# Patient Record
Sex: FEMALE | Race: WHITE | Employment: UNEMPLOYED
[De-identification: names, ages, dates, MRNs, and addresses within clinical notes are randomized per-mention and may not be internally consistent; named-entity substitution may affect disease eponyms.]

---

## 2017-01-23 PROBLEM — R45.0 NERVOUSNESS: Status: ACTIVE | Noted: 2017-01-23

## 2017-03-03 ENCOUNTER — TELEPHONE (OUTPATIENT)
Dept: OBGYN | Facility: CLINIC | Age: 22
End: 2017-03-03

## 2017-11-14 ENCOUNTER — HOSPITAL ENCOUNTER (OUTPATIENT)
Age: 22
Discharge: HOME | End: 2017-11-14
Payer: MEDICARE

## 2017-11-14 PROCEDURE — 87081 CULTURE SCREEN ONLY: CPT

## 2017-11-16 LAB
CULTURE: NORMAL
Lab: NORMAL
SPECIMEN DESCRIPTION: NORMAL
SPECIMEN DESCRIPTION: NORMAL
STATUS: NORMAL

## 2018-02-15 ENCOUNTER — OFFICE VISIT (OUTPATIENT)
Dept: OBGYN | Age: 23
End: 2018-02-15
Payer: MEDICARE

## 2018-02-15 VITALS — DIASTOLIC BLOOD PRESSURE: 72 MMHG | BODY MASS INDEX: 34.86 KG/M2 | WEIGHT: 216 LBS | SYSTOLIC BLOOD PRESSURE: 120 MMHG

## 2018-02-15 DIAGNOSIS — N92.6 IRREGULAR MENSES: Primary | ICD-10-CM

## 2018-02-15 LAB
CONTROL: NORMAL
PREGNANCY TEST URINE, POC: NEGATIVE

## 2018-02-15 PROCEDURE — G8428 CUR MEDS NOT DOCUMENT: HCPCS | Performed by: ADVANCED PRACTICE MIDWIFE

## 2018-02-15 PROCEDURE — 96372 THER/PROPH/DIAG INJ SC/IM: CPT | Performed by: ADVANCED PRACTICE MIDWIFE

## 2018-02-15 PROCEDURE — 81025 URINE PREGNANCY TEST: CPT | Performed by: ADVANCED PRACTICE MIDWIFE

## 2018-02-15 PROCEDURE — G8417 CALC BMI ABV UP PARAM F/U: HCPCS | Performed by: ADVANCED PRACTICE MIDWIFE

## 2018-02-15 RX ORDER — MEDROXYPROGESTERONE ACETATE 150 MG/ML
150 INJECTION, SUSPENSION INTRAMUSCULAR ONCE
Status: COMPLETED | OUTPATIENT
Start: 2018-02-15 | End: 2018-02-15

## 2018-02-15 RX ADMIN — MEDROXYPROGESTERONE ACETATE 150 MG: 150 INJECTION, SUSPENSION INTRAMUSCULAR at 15:26

## 2018-03-15 ENCOUNTER — APPOINTMENT (OUTPATIENT)
Dept: CT IMAGING | Age: 23
End: 2018-03-15
Payer: MEDICARE

## 2018-03-15 ENCOUNTER — HOSPITAL ENCOUNTER (EMERGENCY)
Age: 23
Discharge: HOME | End: 2018-03-15
Attending: EMERGENCY MEDICINE
Payer: MEDICARE

## 2018-03-15 VITALS
SYSTOLIC BLOOD PRESSURE: 119 MMHG | DIASTOLIC BLOOD PRESSURE: 83 MMHG | OXYGEN SATURATION: 99 % | BODY MASS INDEX: 33.09 KG/M2 | WEIGHT: 205 LBS | RESPIRATION RATE: 16 BRPM | HEART RATE: 87 BPM | TEMPERATURE: 98.8 F

## 2018-03-15 DIAGNOSIS — R10.9 FLANK PAIN: Primary | ICD-10-CM

## 2018-03-15 DIAGNOSIS — N20.0 NEPHROLITHIASIS: ICD-10-CM

## 2018-03-15 LAB
-: ABNORMAL
ABSOLUTE EOS #: 0.12 K/UL (ref 0–0.44)
ABSOLUTE IMMATURE GRANULOCYTE: 0.03 K/UL (ref 0–0.3)
ABSOLUTE LYMPH #: 2.27 K/UL (ref 1.1–3.7)
ABSOLUTE MONO #: 0.47 K/UL (ref 0.1–1.2)
ALBUMIN SERPL-MCNC: 4.2 G/DL (ref 3.5–5.2)
ALBUMIN/GLOBULIN RATIO: 1.5 (ref 1–2.5)
ALP BLD-CCNC: 50 U/L (ref 35–104)
ALT SERPL-CCNC: 13 U/L (ref 5–33)
AMORPHOUS: ABNORMAL
ANION GAP SERPL CALCULATED.3IONS-SCNC: 10 MMOL/L (ref 9–17)
AST SERPL-CCNC: 14 U/L
BACTERIA: ABNORMAL
BASOPHILS # BLD: 0 % (ref 0–2)
BASOPHILS ABSOLUTE: 0.03 K/UL (ref 0–0.2)
BILIRUB SERPL-MCNC: 0.23 MG/DL (ref 0.3–1.2)
BILIRUBIN URINE: NEGATIVE
BUN BLDV-MCNC: 19 MG/DL (ref 6–20)
BUN/CREAT BLD: 25 (ref 9–20)
CALCIUM SERPL-MCNC: 9.4 MG/DL (ref 8.6–10.4)
CASTS UA: ABNORMAL /LPF
CHLORIDE BLD-SCNC: 103 MMOL/L (ref 98–107)
CO2: 26 MMOL/L (ref 20–31)
COLOR: YELLOW
COMMENT UA: ABNORMAL
CREAT SERPL-MCNC: 0.77 MG/DL (ref 0.5–0.9)
CRYSTALS, UA: ABNORMAL /HPF
DIFFERENTIAL TYPE: ABNORMAL
EOSINOPHILS RELATIVE PERCENT: 2 % (ref 1–4)
EPITHELIAL CELLS UA: ABNORMAL /HPF (ref 0–25)
GFR AFRICAN AMERICAN: >60 ML/MIN
GFR NON-AFRICAN AMERICAN: >60 ML/MIN
GFR SERPL CREATININE-BSD FRML MDRD: ABNORMAL ML/MIN/{1.73_M2}
GFR SERPL CREATININE-BSD FRML MDRD: ABNORMAL ML/MIN/{1.73_M2}
GLUCOSE BLD-MCNC: 84 MG/DL (ref 70–99)
GLUCOSE URINE: NEGATIVE
HCG(URINE) PREGNANCY TEST: NEGATIVE
HCT VFR BLD CALC: 41.3 % (ref 36.3–47.1)
HEMOGLOBIN: 14 G/DL (ref 11.9–15.1)
IMMATURE GRANULOCYTES: 0 %
KETONES, URINE: NEGATIVE
LEUKOCYTE ESTERASE, URINE: NEGATIVE
LIPASE: 30 U/L (ref 13–60)
LYMPHOCYTES # BLD: 28 % (ref 24–43)
MCH RBC QN AUTO: 30.1 PG (ref 25.2–33.5)
MCHC RBC AUTO-ENTMCNC: 33.9 G/DL (ref 28.4–34.8)
MCV RBC AUTO: 88.8 FL (ref 82.6–102.9)
MONOCYTES # BLD: 6 % (ref 3–12)
MUCUS: ABNORMAL
NITRITE, URINE: NEGATIVE
NRBC AUTOMATED: 0 PER 100 WBC
OTHER OBSERVATIONS UA: ABNORMAL
PDW BLD-RTO: 11.5 % (ref 11.8–14.4)
PH UA: 6.5 (ref 5–9)
PLATELET # BLD: 283 K/UL (ref 138–453)
PLATELET ESTIMATE: ABNORMAL
PMV BLD AUTO: 9.5 FL (ref 8.1–13.5)
POTASSIUM SERPL-SCNC: 4.1 MMOL/L (ref 3.7–5.3)
PROTEIN UA: NEGATIVE
RBC # BLD: 4.65 M/UL (ref 3.95–5.11)
RBC # BLD: ABNORMAL 10*6/UL
RBC UA: ABNORMAL /HPF (ref 0–2)
RENAL EPITHELIAL, UA: ABNORMAL /HPF
SEG NEUTROPHILS: 64 % (ref 36–65)
SEGMENTED NEUTROPHILS ABSOLUTE COUNT: 5.21 K/UL (ref 1.5–8.1)
SODIUM BLD-SCNC: 139 MMOL/L (ref 135–144)
SPECIFIC GRAVITY UA: 1.02 (ref 1.01–1.02)
TOTAL PROTEIN: 7 G/DL (ref 6.4–8.3)
TRICHOMONAS: ABNORMAL
TURBIDITY: CLEAR
URINE HGB: NEGATIVE
UROBILINOGEN, URINE: NORMAL
WBC # BLD: 8.1 K/UL (ref 3.5–11.3)
WBC # BLD: ABNORMAL 10*3/UL
WBC UA: ABNORMAL /HPF (ref 0–5)
YEAST: ABNORMAL

## 2018-03-15 PROCEDURE — 84703 CHORIONIC GONADOTROPIN ASSAY: CPT

## 2018-03-15 PROCEDURE — 85025 COMPLETE CBC W/AUTO DIFF WBC: CPT

## 2018-03-15 PROCEDURE — 6360000004 HC RX CONTRAST MEDICATION: Performed by: EMERGENCY MEDICINE

## 2018-03-15 PROCEDURE — 87086 URINE CULTURE/COLONY COUNT: CPT

## 2018-03-15 PROCEDURE — 80053 COMPREHEN METABOLIC PANEL: CPT

## 2018-03-15 PROCEDURE — 6360000002 HC RX W HCPCS: Performed by: EMERGENCY MEDICINE

## 2018-03-15 PROCEDURE — 96374 THER/PROPH/DIAG INJ IV PUSH: CPT

## 2018-03-15 PROCEDURE — 96375 TX/PRO/DX INJ NEW DRUG ADDON: CPT

## 2018-03-15 PROCEDURE — 83690 ASSAY OF LIPASE: CPT

## 2018-03-15 PROCEDURE — 99284 EMERGENCY DEPT VISIT MOD MDM: CPT

## 2018-03-15 PROCEDURE — 81001 URINALYSIS AUTO W/SCOPE: CPT

## 2018-03-15 PROCEDURE — 74177 CT ABD & PELVIS W/CONTRAST: CPT

## 2018-03-15 PROCEDURE — 36415 COLL VENOUS BLD VENIPUNCTURE: CPT

## 2018-03-15 RX ORDER — ONDANSETRON 2 MG/ML
4 INJECTION INTRAMUSCULAR; INTRAVENOUS ONCE
Status: COMPLETED | OUTPATIENT
Start: 2018-03-15 | End: 2018-03-15

## 2018-03-15 RX ORDER — FENTANYL CITRATE 50 UG/ML
50 INJECTION, SOLUTION INTRAMUSCULAR; INTRAVENOUS ONCE
Status: COMPLETED | OUTPATIENT
Start: 2018-03-15 | End: 2018-03-15

## 2018-03-15 RX ORDER — KETOROLAC TROMETHAMINE 15 MG/ML
15 INJECTION, SOLUTION INTRAMUSCULAR; INTRAVENOUS ONCE
Status: COMPLETED | OUTPATIENT
Start: 2018-03-15 | End: 2018-03-15

## 2018-03-15 RX ADMIN — ONDANSETRON 4 MG: 2 INJECTION INTRAMUSCULAR; INTRAVENOUS at 14:58

## 2018-03-15 RX ADMIN — IOPAMIDOL 100 ML: 612 INJECTION, SOLUTION INTRAVENOUS at 16:06

## 2018-03-15 RX ADMIN — FENTANYL CITRATE 50 MCG: 50 INJECTION INTRAMUSCULAR; INTRAVENOUS at 16:10

## 2018-03-15 RX ADMIN — KETOROLAC TROMETHAMINE 15 MG: 15 INJECTION, SOLUTION INTRAMUSCULAR; INTRAVENOUS at 14:58

## 2018-03-15 ASSESSMENT — ENCOUNTER SYMPTOMS
PHOTOPHOBIA: 0
VOMITING: 0
TROUBLE SWALLOWING: 0
FACIAL SWELLING: 0
SORE THROAT: 0
VOICE CHANGE: 0
SHORTNESS OF BREATH: 0
CHEST TIGHTNESS: 0
BACK PAIN: 0
DIARRHEA: 0
NAUSEA: 0
ABDOMINAL PAIN: 0
COUGH: 0
BLOOD IN STOOL: 0
WHEEZING: 0

## 2018-03-15 ASSESSMENT — PAIN SCALES - GENERAL
PAINLEVEL_OUTOF10: 8
PAINLEVEL_OUTOF10: 9
PAINLEVEL_OUTOF10: 5
PAINLEVEL_OUTOF10: 7
PAINLEVEL_OUTOF10: 8

## 2018-03-15 ASSESSMENT — PAIN DESCRIPTION - PAIN TYPE: TYPE: ACUTE PAIN

## 2018-03-15 ASSESSMENT — PAIN DESCRIPTION - ORIENTATION: ORIENTATION: RIGHT

## 2018-03-15 ASSESSMENT — PAIN DESCRIPTION - LOCATION: LOCATION: FLANK

## 2018-03-15 NOTE — ED NOTES
Pt aware of high volume in the department, awaiting room availability     Lor Johnson, MARCO A  03/15/18 7150

## 2018-03-15 NOTE — ED PROVIDER NOTES
[facility] ED  eMERGENCY dEPARTMENT eNCOUnter      Pt Name: Park Field  MRN: [id number]  [place] 1995  Date of evaluation: 3/15/2018  Provider: Elfego Aviles DO, [address] [facility] [address]       Chief Complaint   Patient presents with    Flank Pain     pt states onset x 3 days. Right sided    Emesis     unable to keep anything down       HISTORY OF PRESENT ILLNESS    Park Field is a 24 y.o. female who presents to the emergency department from home With complaints of right flank pain that started approximately 3 days ago. She has had several episodes of vomiting as well. No fever, no dysuria, no hematuria, no vaginal bleeding or vaginal discharge, no cough, no shortness of breath or chest pain. Patient has a history of ureterolithiasis, states this feels somewhat similar. Triage notes and Nursing notes were reviewed by myself. Any discrepancies are addressed above. PAST MEDICAL HISTORY     Past Medical History:   Diagnosis Date    Deafness     Ganglion cyst of wrist 2010    left    Hereditary nephropathy (Alport's) as achild     had renal biopsy    Irritable bowel syndrome     Kidney disease     Microscopic hematuria     Obesity     Proteinuria     Sensorineural hearing loss     Thin basement membrane disease     Bx proven       SURGICAL HISTORY       Past Surgical History:   Procedure Laterality Date    COLONOSCOPY  2009    Dr. Gus Haywood Left 8/18/2015    KIDNEY BIOPSY  12/29/98    URETEROSCOPY  8-    WISDOM TOOTH EXTRACTION      WRIST SURGERY  2010    cyst removal       CURRENT MEDICATIONS       Previous Medications    AZITHROMYCIN (ZITHROMAX) 250 MG TABLET    Take 2 tabs (500 mg) on Day 1, and take 1 tab (250 mg) on days 2 through 5.     CITALOPRAM (CELEXA) 20 MG TABLET    Take 1 tablet by mouth daily    NORGESTIMATE-ETHINYL ESTRADIOL (ORTHO-CYCLEN, 28,) 0.25-35 MG-MCG PER TABLET    Take 1 tablet by mouth daily    PRENATAL VIT-IRON Does not bruise/bleed easily. Psychiatric/Behavioral: Negative for confusion. The patient is not nervous/anxious. Except as noted above the remainder of the review of systems was reviewed and is negative. PHYSICAL EXAM    (up to 7 for level 4, 8 or more for level 5)     ED Triage Vitals [03/15/18 1330]   BP Temp Temp Source Pulse Resp SpO2 Height Weight   (!) 142/85 98.8 °F (37.1 °C) Tympanic 88 16 99 % -- 205 lb (93 kg)       Physical Exam   Constitutional: She is oriented to person, place, and time. Vital signs are normal. She appears well-developed and well-nourished. Non-toxic appearance. She does not appear ill. No distress. HENT:   Head: Normocephalic and atraumatic. Mouth/Throat: Oropharynx is clear and moist.   Eyes: Conjunctivae and EOM are normal. Pupils are equal, round, and reactive to light. Right conjunctiva is not injected. Left conjunctiva is not injected. No scleral icterus. Neck: Normal range of motion. Neck supple. No tracheal deviation present. No thyromegaly present. Cardiovascular: Normal rate, regular rhythm, normal heart sounds and intact distal pulses. Exam reveals no gallop and no friction rub. No murmur heard. Pulmonary/Chest: Effort normal and breath sounds normal. No stridor. No respiratory distress. She has no wheezes. She has no rales. Abdominal: Soft. Bowel sounds are normal. She exhibits no distension and no mass. There is tenderness (tender palpation right upper quadrant). There is no rebound and no guarding. Negative Varela's sign  Nontender McBurney's Point  Negative Rovsig's sign  No bruising or echymosis of abdomen   Musculoskeletal: She exhibits tenderness (tender to palpation right CVA). She exhibits no edema. Negative Mai's Sign bilaterally   Lymphadenopathy:     She has no cervical adenopathy. Neurological: She is alert and oriented to person, place, and time. No cranial nerve deficit. She exhibits normal muscle tone.  Coordination normal. No nystagmus   Skin: Skin is warm and dry. No rash noted. She is not diaphoretic. No erythema. No pallor. Psychiatric: She has a normal mood and affect. Her behavior is normal. Thought content normal.   Nursing note and vitals reviewed. DIAGNOSTIC RESULTS     EKG: (none if blank)  All EKG's are interpreted by the Emergency Department Physician who either signs or Co-signs this chart in the absence of a cardiologist.        RADIOLOGY: (none if blank)   Interpretation per the Radiologist below, if available at the time of this note:    CT ABDOMEN PELVIS W IV CONTRAST   Final Result          LABS:  Labs Reviewed   URINALYSIS WITH MICROSCOPIC - Abnormal; Notable for the following:        Result Value    Specific Gravity, UA 1.025 (*)     Bacteria, UA TRACE (*)     Mucus, UA TRACE (*)     All other components within normal limits   CBC WITH AUTO DIFFERENTIAL - Abnormal; Notable for the following:     RDW 11.5 (*)     All other components within normal limits   COMPREHENSIVE METABOLIC PANEL - Abnormal; Notable for the following:     Bun/Cre Ratio 25 (*)     Total Bilirubin 0.23 (*)     All other components within normal limits   URINE CULTURE   PREGNANCY, URINE   LIPASE       All other labs were within normal range or not returned as of this dictation. EMERGENCY DEPARTMENT COURSE and Medical Decision Making:     MDM/  ED Course      Nontoxic well appearing 62-year-old lady with right flank pain and right upper quadrant abdominal pain. She has a 7 mm nephrolithiasis on the right, even if passing, is not obstructing.   We will refer her to urology, Dr. Ivon Witt    Strict return precautions and follow up instructions were discussed with the patient with which the patient agrees    ED Medications administered this visit:    Medications   ketorolac (TORADOL) injection 15 mg (15 mg Intravenous Given 3/15/18 1818)   ondansetron (ZOFRAN) injection 4 mg (4 mg Intravenous Given 3/15/18 1458)   fentaNYL (SUBLIMAZE)

## 2018-03-16 ENCOUNTER — HOSPITAL ENCOUNTER (OUTPATIENT)
Age: 23
Discharge: HOME | End: 2018-03-16
Payer: MEDICARE

## 2018-03-16 ENCOUNTER — HOSPITAL ENCOUNTER (OUTPATIENT)
Dept: ULTRASOUND IMAGING | Age: 23
Discharge: HOME | End: 2018-03-18
Payer: MEDICARE

## 2018-03-16 ENCOUNTER — HOSPITAL ENCOUNTER (OUTPATIENT)
Dept: INFUSION THERAPY | Age: 23
Discharge: HOME | End: 2018-03-16
Payer: MEDICARE

## 2018-03-16 VITALS
RESPIRATION RATE: 20 BRPM | HEART RATE: 100 BPM | TEMPERATURE: 98.8 F | SYSTOLIC BLOOD PRESSURE: 146 MMHG | DIASTOLIC BLOOD PRESSURE: 96 MMHG

## 2018-03-16 DIAGNOSIS — R10.11 RUQ PAIN: ICD-10-CM

## 2018-03-16 DIAGNOSIS — R10.9 RT FLANK PAIN: ICD-10-CM

## 2018-03-16 LAB
CULTURE: NORMAL
CULTURE: NORMAL
DIRECT EXAM: NORMAL
Lab: NORMAL
SPECIMEN DESCRIPTION: NORMAL
STATUS: NORMAL
STATUS: NORMAL

## 2018-03-16 PROCEDURE — 87081 CULTURE SCREEN ONLY: CPT

## 2018-03-16 PROCEDURE — 96372 THER/PROPH/DIAG INJ SC/IM: CPT

## 2018-03-16 PROCEDURE — 87070 CULTURE OTHR SPECIMN AEROBIC: CPT

## 2018-03-16 PROCEDURE — 6360000002 HC RX W HCPCS: Performed by: INTERNAL MEDICINE

## 2018-03-16 PROCEDURE — 87491 CHLMYD TRACH DNA AMP PROBE: CPT

## 2018-03-16 PROCEDURE — 76705 ECHO EXAM OF ABDOMEN: CPT

## 2018-03-16 PROCEDURE — 88175 CYTOPATH C/V AUTO FLUID REDO: CPT

## 2018-03-16 PROCEDURE — 87624 HPV HI-RISK TYP POOLED RSLT: CPT

## 2018-03-16 PROCEDURE — 87591 N.GONORRHOEAE DNA AMP PROB: CPT

## 2018-03-16 PROCEDURE — 87210 SMEAR WET MOUNT SALINE/INK: CPT

## 2018-03-16 RX ORDER — CEFTRIAXONE 1 G/1
INJECTION, POWDER, FOR SOLUTION INTRAMUSCULAR; INTRAVENOUS
Status: DISCONTINUED
Start: 2018-03-16 | End: 2018-03-17 | Stop reason: HOSPADM

## 2018-03-16 RX ORDER — CEFTRIAXONE 1 G/1
1 INJECTION, POWDER, FOR SOLUTION INTRAMUSCULAR; INTRAVENOUS ONCE
Status: COMPLETED | OUTPATIENT
Start: 2018-03-16 | End: 2018-03-16

## 2018-03-16 RX ADMIN — CEFTRIAXONE 1 G: 1 INJECTION, POWDER, FOR SOLUTION INTRAMUSCULAR; INTRAVENOUS at 18:34

## 2018-03-16 NOTE — PLAN OF CARE
Spoke with Dr. Mejia Watkins in regards to pt. Had been given option of getting IM Rocephin or being admitted as was discussed between pt and Yemi Cordova NP. Pt. agreed to have IM Rocephin, but now wants to be admitted due to extreme lower abdominal/pelvic pain #10/10. Dr. Mejia Watkins stated pt. Needed to try and let IM Rocephin and PO antibiotics work, and take prescribed Ultram.  \"Admission is not necessary\".

## 2018-03-16 NOTE — PLAN OF CARE
Pt. Lower abdominal/pelvic pain #10/10. Pt. Was given 1GM Rocephin IM (500mg IM (R) deltoid + 500 mg (L) deltoid=1GM total). Bandaid applied to both sites. Pt. Tolerated IM injections and was discharged ambulatory. Informed pt,. If pain still intolerable to F/U with Dr. Giovanni Hess tomorrow.

## 2018-03-19 ENCOUNTER — HOSPITAL ENCOUNTER (OUTPATIENT)
Dept: ULTRASOUND IMAGING | Age: 23
Discharge: HOME | End: 2018-03-21
Payer: MEDICARE

## 2018-03-19 DIAGNOSIS — R10.2 PELVIC PAIN IN FEMALE: ICD-10-CM

## 2018-03-19 DIAGNOSIS — N89.8 VAGINAL DISCHARGE: ICD-10-CM

## 2018-03-19 PROCEDURE — 76856 US EXAM PELVIC COMPLETE: CPT

## 2018-03-20 ENCOUNTER — OFFICE VISIT (OUTPATIENT)
Dept: OBGYN | Age: 23
End: 2018-03-20
Payer: MEDICARE

## 2018-03-20 VITALS
BODY MASS INDEX: 34.52 KG/M2 | WEIGHT: 214.8 LBS | DIASTOLIC BLOOD PRESSURE: 76 MMHG | SYSTOLIC BLOOD PRESSURE: 114 MMHG | HEIGHT: 66 IN

## 2018-03-20 DIAGNOSIS — R10.2 ACUTE PELVIC PAIN, FEMALE: Primary | ICD-10-CM

## 2018-03-20 DIAGNOSIS — H91.3 DEAF, NONSPEAKING: ICD-10-CM

## 2018-03-20 DIAGNOSIS — Z87.42 HISTORY OF DYSMENORRHEA: ICD-10-CM

## 2018-03-20 DIAGNOSIS — N80.03 ADENOMYOSIS: ICD-10-CM

## 2018-03-20 DIAGNOSIS — N92.1 BREAKTHROUGH BLEEDING ON DEPO PROVERA: ICD-10-CM

## 2018-03-20 LAB
CHLAMYDIA BY THIN PREP: NEGATIVE
CULTURE: NORMAL
Lab: NORMAL
N. GONORRHOEAE DNA, THIN PREP: NEGATIVE
SPECIMEN DESCRIPTION: NORMAL
SPECIMEN DESCRIPTION: NORMAL
STATUS: NORMAL

## 2018-03-20 PROCEDURE — 4004F PT TOBACCO SCREEN RCVD TLK: CPT | Performed by: ADVANCED PRACTICE MIDWIFE

## 2018-03-20 PROCEDURE — G8417 CALC BMI ABV UP PARAM F/U: HCPCS | Performed by: ADVANCED PRACTICE MIDWIFE

## 2018-03-20 PROCEDURE — G8427 DOCREV CUR MEDS BY ELIG CLIN: HCPCS | Performed by: ADVANCED PRACTICE MIDWIFE

## 2018-03-20 PROCEDURE — G8484 FLU IMMUNIZE NO ADMIN: HCPCS | Performed by: ADVANCED PRACTICE MIDWIFE

## 2018-03-20 PROCEDURE — 99214 OFFICE O/P EST MOD 30 MIN: CPT | Performed by: ADVANCED PRACTICE MIDWIFE

## 2018-03-20 RX ORDER — TRAMADOL HYDROCHLORIDE 50 MG/1
50 TABLET ORAL EVERY 6 HOURS PRN
COMMUNITY
End: 2018-04-17

## 2018-03-20 RX ORDER — DOXYCYCLINE HYCLATE 100 MG/1
CAPSULE ORAL
COMMUNITY
Start: 2018-03-16 | End: 2018-03-20 | Stop reason: SDUPTHER

## 2018-03-20 RX ORDER — NAPROXEN 500 MG/1
500 TABLET ORAL 2 TIMES DAILY WITH MEALS
Qty: 60 TABLET | Refills: 3 | Status: SHIPPED | OUTPATIENT
Start: 2018-03-20 | End: 2018-04-17

## 2018-03-20 NOTE — PROGRESS NOTES
PROBLEM VISIT     Date of service: 3/20/2018    Tito Colin  Is a 24 y.o. single female    PT's PCP is: Leopold Flake, MD     : 1995                                             Subjective:       Patient's last menstrual period was 2018. OB History    Para Term  AB Living   2 1 1     1   SAB TAB Ectopic Molar Multiple Live Births             1      # Outcome Date GA Lbr Poli/2nd Weight Sex Delivery Anes PTL Lv   2 Term 14 40w0d  7 lb 6 oz (3.345 kg) F Vag-Spont  N NAYELI   1                     History   Smoking Status    Current Some Day Smoker    Types: Cigarettes    Last attempt to quit: 3/18/2014   Smokeless Tobacco    Never Used     Comment: several cigarettes daily        History   Alcohol Use No       Allergies: Patient has no known allergies.       Current Outpatient Prescriptions:     traMADol (ULTRAM) 50 MG tablet, Take 50 mg by mouth every 6 hours as needed for Pain., Disp: , Rfl:     naproxen (NAPROSYN) 500 MG tablet, Take 1 tablet by mouth 2 times daily (with meals), Disp: 60 tablet, Rfl: 3    ondansetron (ZOFRAN) 4 MG tablet, Take 1 tablet by mouth every 8 hours as needed for Nausea or Vomiting, Disp: 15 tablet, Rfl: 0    metroNIDAZOLE (FLAGYL) 500 MG tablet, Take 1 tablet by mouth 2 times daily for 14 days, Disp: 28 tablet, Rfl: 0    doxycycline hyclate (VIBRA-TABS) 100 MG tablet, Take 1 tablet by mouth 2 times daily for 14 days, Disp: 28 tablet, Rfl: 0    medroxyPROGESTERone (DEPO-PROVERA) 150 MG/ML injection, Inject 150 mg into the muscle every 3 months, Disp: , Rfl:     citalopram (CELEXA) 20 MG tablet, Take 1 tablet by mouth daily, Disp: 30 tablet, Rfl: 5    topiramate (TOPAMAX) 25 MG tablet, Take 1 tablet by mouth daily, Disp: 30 tablet, Rfl: 1    History   Sexual Activity    Sexual activity: Yes    Partners: Male       Last Yearly:  3/16/18    Last pap: 3/16/18    Last HPV: 3/16/18    Chief Complaint   Patient presents with   [facility] shop to  her medications and they were closed at that point. Patient then followed up with Dr. Teodoro Hammond yesterday. We reviewed this. The CT scan prevent any by the ER and the ultrasound that Isabel Frazier had ordered. CT scan was uneventful. Cultures returning are negative. Pap remains still out. Ultrasound was within normal limits with a possible adenomyosis. Patient states vaginal discharge at this point in time is old blood but she is still changing her pad    Yes PT denies fever, chills, nausea and vomiting       Objective     Pelvic Exam: Right lower quadrant tender with palpation                         Assessment and Plan         1. Acute pelvic pain, female  naproxen (NAPROSYN) 500 MG tablet   2. Deaf, nonspeaking  naproxen (NAPROSYN) 500 MG tablet   3. Breakthrough bleeding on depo provera     4. History of dysmenorrhea     5. Adenomyosis          discussed with patient her history of dysmenorrhea we were utilizing Depo-Provera to control her cycles the first injection of Depo-Provera was mid February 2018 and has only been about 4 weeks and fat injection. Patient did have what appears to be a period on the depo injection with significant left lower quadrant pain. Discussed anti-inflammatory medications to help alleviate this  Discussed possible adenomyosis versus endometriosis  Discussed heating pad with ice packs  Discussed no resections at this point in time  Discussed returning to office with significant increase in pain  Discussed diagnostic laparoscopy for further review  Patient discussed she would like a female doctor  Referral to Dr. Lisa Reyes    Patient is comfortable at this point in time with current plan of care    All of this was completed through a certified . I am having Ms. Tenorio start on naproxen. I am also having her maintain her citalopram, topiramate, medroxyPROGESTERone, ondansetron, metroNIDAZOLE, doxycycline hyclate, and traMADol.     Next week with Dr. Cristiano Rawls      Over 50% of time spent on counseling and care coordination on: see assessment and plan,  She was also counseled on her preventative health maintenance recommendations and follow-up.         FF time: 25 min      Josphine Amass Pool,3/20/2018 5:58 PM

## 2018-03-21 LAB
HPV SAMPLE: ABNORMAL
HPV SOURCE: ABNORMAL
HPV, GENOTYPE 16: NOT DETECTED
HPV, GENOTYPE 18: NOT DETECTED
HPV, HIGH RISK OTHER: DETECTED
HPV, INTERPRETATION: ABNORMAL

## 2018-03-26 ENCOUNTER — OFFICE VISIT (OUTPATIENT)
Dept: OBGYN | Age: 23
End: 2018-03-26
Payer: MEDICARE

## 2018-03-26 VITALS — BODY MASS INDEX: 34.8 KG/M2 | DIASTOLIC BLOOD PRESSURE: 78 MMHG | WEIGHT: 215.6 LBS | SYSTOLIC BLOOD PRESSURE: 124 MMHG

## 2018-03-26 DIAGNOSIS — N94.6 DYSMENORRHEA: ICD-10-CM

## 2018-03-26 DIAGNOSIS — Z01.818 PREOP TESTING: ICD-10-CM

## 2018-03-26 DIAGNOSIS — R93.49 ABNORMAL RADIOLOGIC FINDINGS ON DIAGNOSTIC IMAGING OF OTHER URINARY ORGANS: ICD-10-CM

## 2018-03-26 DIAGNOSIS — N92.1 MENORRHAGIA WITH IRREGULAR CYCLE: Primary | ICD-10-CM

## 2018-03-26 PROCEDURE — G8417 CALC BMI ABV UP PARAM F/U: HCPCS | Performed by: OBSTETRICS & GYNECOLOGY

## 2018-03-26 PROCEDURE — G8427 DOCREV CUR MEDS BY ELIG CLIN: HCPCS | Performed by: OBSTETRICS & GYNECOLOGY

## 2018-03-26 PROCEDURE — 4004F PT TOBACCO SCREEN RCVD TLK: CPT | Performed by: OBSTETRICS & GYNECOLOGY

## 2018-03-26 PROCEDURE — 99213 OFFICE O/P EST LOW 20 MIN: CPT | Performed by: OBSTETRICS & GYNECOLOGY

## 2018-03-26 PROCEDURE — G8484 FLU IMMUNIZE NO ADMIN: HCPCS | Performed by: OBSTETRICS & GYNECOLOGY

## 2018-03-26 RX ORDER — ACETAMINOPHEN AND CODEINE PHOSPHATE 300; 30 MG/1; MG/1
1-2 TABLET ORAL EVERY 6 HOURS PRN
Qty: 20 TABLET | Refills: 0 | Status: SHIPPED | OUTPATIENT
Start: 2018-03-26 | End: 2018-03-31

## 2018-03-27 ENCOUNTER — HOSPITAL ENCOUNTER (OUTPATIENT)
Age: 23
Discharge: HOME | End: 2018-03-27
Payer: MEDICARE

## 2018-03-27 DIAGNOSIS — R93.49 ABNORMAL RADIOLOGIC FINDINGS ON DIAGNOSTIC IMAGING OF OTHER URINARY ORGANS: ICD-10-CM

## 2018-03-27 DIAGNOSIS — Z01.818 PREOP TESTING: ICD-10-CM

## 2018-03-27 DIAGNOSIS — N92.1 MENORRHAGIA WITH IRREGULAR CYCLE: ICD-10-CM

## 2018-03-27 LAB
HCG QUANTITATIVE: <1 IU/L
HCT VFR BLD CALC: 44.7 % (ref 36.3–47.1)
HEMOGLOBIN: 14.7 G/DL (ref 11.9–15.1)
MCH RBC QN AUTO: 30 PG (ref 25.2–33.5)
MCHC RBC AUTO-ENTMCNC: 32.9 G/DL (ref 28.4–34.8)
MCV RBC AUTO: 91.2 FL (ref 82.6–102.9)
NRBC AUTOMATED: 0 PER 100 WBC
PDW BLD-RTO: 11.4 % (ref 11.8–14.4)
PLATELET # BLD: 289 K/UL (ref 138–453)
PMV BLD AUTO: 9.6 FL (ref 8.1–13.5)
RBC # BLD: 4.9 M/UL (ref 3.95–5.11)
WBC # BLD: 6.6 K/UL (ref 3.5–11.3)

## 2018-03-27 PROCEDURE — 84702 CHORIONIC GONADOTROPIN TEST: CPT

## 2018-03-27 PROCEDURE — 36415 COLL VENOUS BLD VENIPUNCTURE: CPT

## 2018-03-27 PROCEDURE — 85027 COMPLETE CBC AUTOMATED: CPT

## 2018-03-27 ASSESSMENT — ENCOUNTER SYMPTOMS
NAUSEA: 0
VOMITING: 0

## 2018-03-28 LAB — CYTOLOGY REPORT: NORMAL

## 2018-04-17 ENCOUNTER — OFFICE VISIT (OUTPATIENT)
Dept: OBGYN | Age: 23
End: 2018-04-17

## 2018-04-17 VITALS — WEIGHT: 222.2 LBS | BODY MASS INDEX: 35.86 KG/M2 | DIASTOLIC BLOOD PRESSURE: 78 MMHG | SYSTOLIC BLOOD PRESSURE: 118 MMHG

## 2018-04-17 DIAGNOSIS — N92.1 MENORRHAGIA WITH IRREGULAR CYCLE: Primary | ICD-10-CM

## 2018-04-17 DIAGNOSIS — N80.03 ADENOMYOSIS: ICD-10-CM

## 2018-04-17 PROCEDURE — 99024 POSTOP FOLLOW-UP VISIT: CPT | Performed by: OBSTETRICS & GYNECOLOGY

## 2018-04-17 RX ORDER — IBUPROFEN 800 MG/1
TABLET ORAL
COMMUNITY
Start: 2018-04-05 | End: 2019-02-12

## 2018-05-08 ENCOUNTER — OFFICE VISIT (OUTPATIENT)
Dept: OBGYN | Age: 23
End: 2018-05-08
Payer: MEDICARE

## 2018-05-08 VITALS
WEIGHT: 219 LBS | HEIGHT: 66 IN | BODY MASS INDEX: 35.2 KG/M2 | SYSTOLIC BLOOD PRESSURE: 112 MMHG | DIASTOLIC BLOOD PRESSURE: 72 MMHG

## 2018-05-08 DIAGNOSIS — N92.1 MENORRHAGIA WITH IRREGULAR CYCLE: Primary | ICD-10-CM

## 2018-05-08 DIAGNOSIS — N80.03 ADENOMYOSIS: ICD-10-CM

## 2018-05-08 PROCEDURE — 4004F PT TOBACCO SCREEN RCVD TLK: CPT | Performed by: OBSTETRICS & GYNECOLOGY

## 2018-05-08 PROCEDURE — G8417 CALC BMI ABV UP PARAM F/U: HCPCS | Performed by: OBSTETRICS & GYNECOLOGY

## 2018-05-08 PROCEDURE — G8427 DOCREV CUR MEDS BY ELIG CLIN: HCPCS | Performed by: OBSTETRICS & GYNECOLOGY

## 2018-05-08 PROCEDURE — 96372 THER/PROPH/DIAG INJ SC/IM: CPT | Performed by: OBSTETRICS & GYNECOLOGY

## 2018-05-08 PROCEDURE — 99213 OFFICE O/P EST LOW 20 MIN: CPT | Performed by: OBSTETRICS & GYNECOLOGY

## 2018-05-08 RX ORDER — MEDROXYPROGESTERONE ACETATE 150 MG/ML
150 INJECTION, SUSPENSION INTRAMUSCULAR ONCE
Status: COMPLETED | OUTPATIENT
Start: 2018-05-08 | End: 2018-05-08

## 2018-05-08 RX ORDER — MEDROXYPROGESTERONE ACETATE 150 MG/ML
150 INJECTION, SUSPENSION INTRAMUSCULAR ONCE
Status: CANCELLED | OUTPATIENT
Start: 2018-05-08 | End: 2018-05-08

## 2018-05-08 RX ADMIN — MEDROXYPROGESTERONE ACETATE 150 MG: 150 INJECTION, SUSPENSION INTRAMUSCULAR at 11:07

## 2018-08-01 ENCOUNTER — NURSE ONLY (OUTPATIENT)
Dept: OBGYN | Age: 23
End: 2018-08-01
Payer: MEDICARE

## 2018-08-01 VITALS — DIASTOLIC BLOOD PRESSURE: 80 MMHG | BODY MASS INDEX: 35.67 KG/M2 | SYSTOLIC BLOOD PRESSURE: 118 MMHG | WEIGHT: 221 LBS

## 2018-08-01 DIAGNOSIS — N92.6 IRREGULAR MENSES: Primary | ICD-10-CM

## 2018-08-01 PROCEDURE — 96372 THER/PROPH/DIAG INJ SC/IM: CPT | Performed by: ADVANCED PRACTICE MIDWIFE

## 2018-08-01 RX ORDER — MEDROXYPROGESTERONE ACETATE 150 MG/ML
150 INJECTION, SUSPENSION INTRAMUSCULAR ONCE
Status: COMPLETED | OUTPATIENT
Start: 2018-08-01 | End: 2018-08-01

## 2018-08-01 RX ADMIN — MEDROXYPROGESTERONE ACETATE 150 MG: 150 INJECTION, SUSPENSION INTRAMUSCULAR at 10:17

## 2018-08-01 NOTE — PROGRESS NOTES
Nurse visit Depo    Date of service: 2018    yLndsay Agustin  Is a 24 y.o.  female    PT's PCP is: Sara Shabazz MD     : 1995                                             Subjective:       No LMP recorded. Patient has had an injection. Allergies: Patient has no known allergies. Chief Complaint   Patient presents with    Injections     depo-office supplied       Last yearly:     Last pap: 3/2018     Last HPV:  ( if due for pap schedule pap)    LAST DEPO: 2018 ( if past 13 weeks and not on period please talk with provider)    PE:  Vital Signs  Blood pressure 118/80, weight 221 lb (100.2 kg), not currently breastfeeding. Labs:    No results found for this visit on 18. No  PT denies fever, chills, nausea and vomiting                            Assessment and Plan          Diagnosis Orders   1.  Irregular menses  medroxyPROGESTERone (DEPO-PROVERA) injection 150 mg         Depo was: Office supplied      NURSE: Sosa Gibson LPN

## 2018-08-21 ENCOUNTER — TELEPHONE (OUTPATIENT)
Dept: OBGYN | Age: 23
End: 2018-08-21

## 2018-08-21 ENCOUNTER — OFFICE VISIT (OUTPATIENT)
Dept: OBGYN | Age: 23
End: 2018-08-21
Payer: MEDICARE

## 2018-08-21 VITALS
HEIGHT: 66 IN | BODY MASS INDEX: 36.26 KG/M2 | DIASTOLIC BLOOD PRESSURE: 82 MMHG | WEIGHT: 225.6 LBS | SYSTOLIC BLOOD PRESSURE: 122 MMHG

## 2018-08-21 DIAGNOSIS — R10.2 PELVIC PAIN: ICD-10-CM

## 2018-08-21 DIAGNOSIS — N80.03 ADENOMYOSIS: Primary | ICD-10-CM

## 2018-08-21 DIAGNOSIS — N92.6 IRREGULAR MENSES: ICD-10-CM

## 2018-08-21 PROCEDURE — 99214 OFFICE O/P EST MOD 30 MIN: CPT | Performed by: OBSTETRICS & GYNECOLOGY

## 2018-08-21 PROCEDURE — 4004F PT TOBACCO SCREEN RCVD TLK: CPT | Performed by: OBSTETRICS & GYNECOLOGY

## 2018-08-21 PROCEDURE — G8427 DOCREV CUR MEDS BY ELIG CLIN: HCPCS | Performed by: OBSTETRICS & GYNECOLOGY

## 2018-08-21 PROCEDURE — G8417 CALC BMI ABV UP PARAM F/U: HCPCS | Performed by: OBSTETRICS & GYNECOLOGY

## 2018-08-21 NOTE — PROGRESS NOTES
Topics Concern    None     Social History Narrative    None       REVIEW OF SYSTEMS:  Review of Systems   Genitourinary: Positive for menstrual problem and pelvic pain. Negative for dysuria, vaginal discharge and vaginal pain. PHYSICAL EXAM:  /82 (Site: Left Arm, Position: Sitting, Cuff Size: Large Adult)   Ht 5' 6\" (1.676 m)   Wt 225 lb 9.6 oz (102.3 kg)   BMI 36.41 kg/m²   Physical Exam   Constitutional: She is oriented to person, place, and time. She appears well-developed and well-nourished. HENT:   Head: Normocephalic and atraumatic. Eyes: Pupils are equal, round, and reactive to light. EOM are normal.   Neck: Normal range of motion. Cardiovascular: Normal rate. Pulmonary/Chest: Effort normal.   Musculoskeletal: Normal range of motion. Neurological: She is alert and oriented to person, place, and time. Skin: Skin is warm and dry. Psychiatric: She has a normal mood and affect. Her behavior is normal. Judgment and thought content normal.       ASSESSMENT:      1. Adenomyosis    2. Irregular menses    3. Pelvic pain        PLAN:    Pt has chronic pelvic pain that is problematic daily - she wants to have the option of future pregnancies - interested in lupron/orilissa if [facility] will cover.       Electronically signed by Maureen Ely DO on 08/21/18

## 2018-09-22 ENCOUNTER — APPOINTMENT (OUTPATIENT)
Dept: GENERAL RADIOLOGY | Age: 23
End: 2018-09-22
Payer: MEDICARE

## 2018-09-22 ENCOUNTER — HOSPITAL ENCOUNTER (EMERGENCY)
Age: 23
Discharge: HOME | End: 2018-09-22
Payer: MEDICARE

## 2018-09-22 VITALS
RESPIRATION RATE: 16 BRPM | DIASTOLIC BLOOD PRESSURE: 80 MMHG | OXYGEN SATURATION: 98 % | TEMPERATURE: 97 F | HEART RATE: 81 BPM | SYSTOLIC BLOOD PRESSURE: 121 MMHG

## 2018-09-22 DIAGNOSIS — T14.8XXA ABRASION: ICD-10-CM

## 2018-09-22 DIAGNOSIS — S80.02XA CONTUSION OF LEFT KNEE, INITIAL ENCOUNTER: Primary | ICD-10-CM

## 2018-09-22 PROCEDURE — 6360000002 HC RX W HCPCS: Performed by: PHYSICIAN ASSISTANT

## 2018-09-22 PROCEDURE — 73562 X-RAY EXAM OF KNEE 3: CPT

## 2018-09-22 PROCEDURE — 99283 EMERGENCY DEPT VISIT LOW MDM: CPT

## 2018-09-22 PROCEDURE — 90471 IMMUNIZATION ADMIN: CPT | Performed by: PHYSICIAN ASSISTANT

## 2018-09-22 PROCEDURE — 6370000000 HC RX 637 (ALT 250 FOR IP): Performed by: PHYSICIAN ASSISTANT

## 2018-09-22 PROCEDURE — 90715 TDAP VACCINE 7 YRS/> IM: CPT | Performed by: PHYSICIAN ASSISTANT

## 2018-09-22 RX ORDER — HYDROCODONE BITARTRATE AND ACETAMINOPHEN 5; 325 MG/1; MG/1
1 TABLET ORAL EVERY 6 HOURS PRN
Qty: 16 TABLET | Refills: 0 | Status: SHIPPED | OUTPATIENT
Start: 2018-09-22 | End: 2018-09-26

## 2018-09-22 RX ADMIN — TETANUS TOXOID, REDUCED DIPHTHERIA TOXOID AND ACELLULAR PERTUSSIS VACCINE, ADSORBED 0.5 ML: 5; 2.5; 8; 8; 2.5 SUSPENSION INTRAMUSCULAR at 12:20

## 2018-09-22 ASSESSMENT — PAIN DESCRIPTION - LOCATION: LOCATION: KNEE

## 2018-09-22 ASSESSMENT — PAIN SCALES - GENERAL: PAINLEVEL_OUTOF10: 10

## 2018-09-22 ASSESSMENT — PAIN DESCRIPTION - PAIN TYPE: TYPE: ACUTE PAIN

## 2018-09-22 ASSESSMENT — PAIN DESCRIPTION - DESCRIPTORS: DESCRIPTORS: ACHING;THROBBING

## 2018-09-22 ASSESSMENT — PAIN DESCRIPTION - ORIENTATION: ORIENTATION: LEFT

## 2018-09-22 ASSESSMENT — PAIN DESCRIPTION - FREQUENCY: FREQUENCY: CONTINUOUS

## 2018-10-06 NOTE — ED PROVIDER NOTES
Contusion of left knee, initial encounter    2. Abrasion          DISPOSITION/PLAN   DISPOSITION Decision To Discharge 09/22/2018 12:01:15 PM      PATIENT REFERRED TO:  No follow-up provider specified. DISCHARGE MEDICATIONS:  Discharge Medication List as of 9/22/2018 12:04 PM      START taking these medications    Details   HYDROcodone-acetaminophen (NORCO) 5-325 MG per tablet Take 1 tablet by mouth every 6 hours as needed for Pain for up to 4 days. Intended supply: 5 days. Take lowest dose possible to manage pain., Disp-16 tablet, R-0Print           Attestation: The Prescription Monitoring Report for this patient was reviewed today. Nia Dutta PA-C)  Documentation: Possible medication side effects, risk of tolerance/dependence & alternative treatments discussed., No signs of potential drug abuse or diversion identified.  Nia Dutta PA-C)        (Please note that portions of this note were completed with a voice recognition program.  Efforts were made to edit the dictations but occasionally words are mis-transcribed.)      Electronically signed by Nia Dutta PA-C on 10/6/18 at 4:56 PM           Nia Dutta PA-C  10/06/18 5793
mis-transcribed.)      Electronically signed by Kayla Bradley PA-C on 9/22/18 at 11:33 AM             Kayla Bradley PA-C  09/22/18 1580

## 2018-10-24 ENCOUNTER — NURSE ONLY (OUTPATIENT)
Dept: OBGYN | Age: 23
End: 2018-10-24
Payer: MEDICARE

## 2018-10-24 VITALS — SYSTOLIC BLOOD PRESSURE: 124 MMHG | BODY MASS INDEX: 36.96 KG/M2 | DIASTOLIC BLOOD PRESSURE: 80 MMHG | WEIGHT: 229 LBS

## 2018-10-24 DIAGNOSIS — N92.6 IRREGULAR BLEEDING: Primary | ICD-10-CM

## 2018-10-24 PROCEDURE — 96372 THER/PROPH/DIAG INJ SC/IM: CPT | Performed by: OBSTETRICS & GYNECOLOGY

## 2018-10-24 RX ORDER — MEDROXYPROGESTERONE ACETATE 150 MG/ML
150 INJECTION, SUSPENSION INTRAMUSCULAR ONCE
Status: COMPLETED | OUTPATIENT
Start: 2018-10-24 | End: 2018-10-24

## 2018-10-24 RX ADMIN — MEDROXYPROGESTERONE ACETATE 150 MG: 150 INJECTION, SUSPENSION INTRAMUSCULAR at 10:16

## 2018-10-24 NOTE — PROGRESS NOTES
Nurse visit Depo    Date of service: 10/24/2018    Esther Delgado  Is a 24 y.o.  female    PT's PCP is: Henry Frazier MD     : 1995                                             Subjective:       No LMP recorded. Patient has had an injection. Allergies: Patient has no known allergies. Chief Complaint   Patient presents with    Injections     depo injection - office supplied -        Last yearly: 2018    Last pap:      Last HPV:  ( if due for pap schedule pap)    LAST DEPO: 2018 ( if past 13 weeks and not on period please talk with provider)    PE:  Vital Signs  Blood pressure 124/80, weight 229 lb (103.9 kg), not currently breastfeeding. Labs:    No results found for this visit on 10/24/18. Yes  PT denies fever, chills, nausea and vomiting                            Assessment and Plan          Diagnosis Orders   1.  Irregular bleeding  medroxyPROGESTERone (DEPO-PROVERA) injection 150 mg         Depo was: Office supplied      NURSE: Estrella Frazier MA

## 2018-11-07 ENCOUNTER — APPOINTMENT (OUTPATIENT)
Dept: CT IMAGING | Age: 23
End: 2018-11-07
Payer: MEDICARE

## 2018-11-07 ENCOUNTER — HOSPITAL ENCOUNTER (EMERGENCY)
Age: 23
Discharge: HOME | End: 2018-11-07
Payer: MEDICARE

## 2018-11-07 VITALS
SYSTOLIC BLOOD PRESSURE: 118 MMHG | DIASTOLIC BLOOD PRESSURE: 74 MMHG | RESPIRATION RATE: 16 BRPM | TEMPERATURE: 98.2 F | OXYGEN SATURATION: 98 % | HEART RATE: 72 BPM

## 2018-11-07 DIAGNOSIS — N20.0 KIDNEY STONE: Primary | ICD-10-CM

## 2018-11-07 LAB
-: ABNORMAL
ALBUMIN SERPL-MCNC: 4.3 G/DL (ref 3.5–5.2)
ALBUMIN/GLOBULIN RATIO: 1.8 (ref 1–2.5)
ALP BLD-CCNC: 52 U/L (ref 35–104)
ALT SERPL-CCNC: 16 U/L (ref 5–33)
AMORPHOUS: ABNORMAL
AMPHETAMINE SCREEN URINE: NEGATIVE
ANION GAP SERPL CALCULATED.3IONS-SCNC: 10 MMOL/L (ref 9–17)
AST SERPL-CCNC: 17 U/L
BACTERIA: ABNORMAL
BARBITURATE SCREEN URINE: NEGATIVE
BENZODIAZEPINE SCREEN, URINE: NEGATIVE
BILIRUB SERPL-MCNC: <0.1 MG/DL (ref 0.3–1.2)
BILIRUBIN URINE: NEGATIVE
BUN BLDV-MCNC: 14 MG/DL (ref 6–20)
BUN/CREAT BLD: 16 (ref 9–20)
BUPRENORPHINE URINE: NEGATIVE
CALCIUM SERPL-MCNC: 9.1 MG/DL (ref 8.6–10.4)
CANNABINOID SCREEN URINE: POSITIVE
CASTS UA: ABNORMAL /LPF
CHLORIDE BLD-SCNC: 104 MMOL/L (ref 98–107)
CO2: 24 MMOL/L (ref 20–31)
COCAINE METABOLITE, URINE: NEGATIVE
COLOR: YELLOW
COMMENT UA: ABNORMAL
CREAT SERPL-MCNC: 0.86 MG/DL (ref 0.5–0.9)
CRYSTALS, UA: ABNORMAL /HPF
EPITHELIAL CELLS UA: ABNORMAL /HPF (ref 0–25)
GFR AFRICAN AMERICAN: >60 ML/MIN
GFR NON-AFRICAN AMERICAN: >60 ML/MIN
GFR SERPL CREATININE-BSD FRML MDRD: ABNORMAL ML/MIN/{1.73_M2}
GFR SERPL CREATININE-BSD FRML MDRD: ABNORMAL ML/MIN/{1.73_M2}
GLUCOSE BLD-MCNC: 85 MG/DL (ref 70–99)
GLUCOSE URINE: NEGATIVE
HCG(URINE) PREGNANCY TEST: NEGATIVE
HCT VFR BLD CALC: 40.1 % (ref 36.3–47.1)
HEMOGLOBIN: 13.2 G/DL (ref 11.9–15.1)
KETONES, URINE: NEGATIVE
LEUKOCYTE ESTERASE, URINE: ABNORMAL
LIPASE: 35 U/L (ref 13–60)
MCH RBC QN AUTO: 30.5 PG (ref 25.2–33.5)
MCHC RBC AUTO-ENTMCNC: 32.9 G/DL (ref 28.4–34.8)
MCV RBC AUTO: 92.6 FL (ref 82.6–102.9)
MDMA URINE: ABNORMAL
METHADONE SCREEN, URINE: NEGATIVE
METHAMPHETAMINE, URINE: NEGATIVE
MUCUS: ABNORMAL
NITRITE, URINE: NEGATIVE
NRBC AUTOMATED: 0 PER 100 WBC
OPIATES, URINE: NEGATIVE
OTHER OBSERVATIONS UA: ABNORMAL
OXYCODONE SCREEN URINE: NEGATIVE
PDW BLD-RTO: 11.5 % (ref 11.8–14.4)
PH UA: 6 (ref 5–9)
PHENCYCLIDINE, URINE: NEGATIVE
PLATELET # BLD: 293 K/UL (ref 138–453)
PMV BLD AUTO: 9.3 FL (ref 8.1–13.5)
POTASSIUM SERPL-SCNC: 3.9 MMOL/L (ref 3.7–5.3)
PROPOXYPHENE, URINE: NEGATIVE
PROTEIN UA: NEGATIVE
RBC # BLD: 4.33 M/UL (ref 3.95–5.11)
RBC UA: ABNORMAL /HPF (ref 0–2)
RENAL EPITHELIAL, UA: ABNORMAL /HPF
SODIUM BLD-SCNC: 138 MMOL/L (ref 135–144)
SPECIFIC GRAVITY UA: 1.02 (ref 1.01–1.02)
TEST INFORMATION: ABNORMAL
TOTAL PROTEIN: 6.7 G/DL (ref 6.4–8.3)
TRICHOMONAS: ABNORMAL
TRICYCLIC ANTIDEPRESSANTS, UR: NEGATIVE
TURBIDITY: CLEAR
URINE HGB: ABNORMAL
UROBILINOGEN, URINE: NORMAL
WBC # BLD: 5.3 K/UL (ref 3.5–11.3)
WBC UA: ABNORMAL /HPF (ref 0–5)
YEAST: ABNORMAL

## 2018-11-07 PROCEDURE — 96375 TX/PRO/DX INJ NEW DRUG ADDON: CPT

## 2018-11-07 PROCEDURE — 80306 DRUG TEST PRSMV INSTRMNT: CPT

## 2018-11-07 PROCEDURE — 6360000002 HC RX W HCPCS: Performed by: PHYSICIAN ASSISTANT

## 2018-11-07 PROCEDURE — 6370000000 HC RX 637 (ALT 250 FOR IP): Performed by: PHYSICIAN ASSISTANT

## 2018-11-07 PROCEDURE — 87086 URINE CULTURE/COLONY COUNT: CPT

## 2018-11-07 PROCEDURE — 99284 EMERGENCY DEPT VISIT MOD MDM: CPT

## 2018-11-07 PROCEDURE — 80053 COMPREHEN METABOLIC PANEL: CPT

## 2018-11-07 PROCEDURE — 74177 CT ABD & PELVIS W/CONTRAST: CPT

## 2018-11-07 PROCEDURE — 6370000000 HC RX 637 (ALT 250 FOR IP): Performed by: EMERGENCY MEDICINE

## 2018-11-07 PROCEDURE — 96374 THER/PROPH/DIAG INJ IV PUSH: CPT

## 2018-11-07 PROCEDURE — 85027 COMPLETE CBC AUTOMATED: CPT

## 2018-11-07 PROCEDURE — 81001 URINALYSIS AUTO W/SCOPE: CPT

## 2018-11-07 PROCEDURE — 84703 CHORIONIC GONADOTROPIN ASSAY: CPT

## 2018-11-07 PROCEDURE — 2580000003 HC RX 258: Performed by: PHYSICIAN ASSISTANT

## 2018-11-07 PROCEDURE — 6360000004 HC RX CONTRAST MEDICATION: Performed by: PHYSICIAN ASSISTANT

## 2018-11-07 PROCEDURE — 83690 ASSAY OF LIPASE: CPT

## 2018-11-07 RX ORDER — CEPHALEXIN 500 MG/1
500 CAPSULE ORAL 3 TIMES DAILY
Qty: 21 CAPSULE | Refills: 0 | Status: SHIPPED | OUTPATIENT
Start: 2018-11-07 | End: 2019-02-12

## 2018-11-07 RX ORDER — 0.9 % SODIUM CHLORIDE 0.9 %
1000 INTRAVENOUS SOLUTION INTRAVENOUS ONCE
Status: COMPLETED | OUTPATIENT
Start: 2018-11-07 | End: 2018-11-07

## 2018-11-07 RX ORDER — KETOROLAC TROMETHAMINE 30 MG/ML
30 INJECTION, SOLUTION INTRAMUSCULAR; INTRAVENOUS ONCE
Status: COMPLETED | OUTPATIENT
Start: 2018-11-07 | End: 2018-11-07

## 2018-11-07 RX ORDER — DICYCLOMINE HYDROCHLORIDE 10 MG/1
10 CAPSULE ORAL ONCE
Status: COMPLETED | OUTPATIENT
Start: 2018-11-07 | End: 2018-11-07

## 2018-11-07 RX ORDER — HYDROCODONE BITARTRATE AND ACETAMINOPHEN 5; 325 MG/1; MG/1
1 TABLET ORAL EVERY 6 HOURS PRN
Qty: 10 TABLET | Refills: 0 | Status: SHIPPED | OUTPATIENT
Start: 2018-11-07 | End: 2018-11-14

## 2018-11-07 RX ORDER — ONDANSETRON 2 MG/ML
4 INJECTION INTRAMUSCULAR; INTRAVENOUS ONCE
Status: COMPLETED | OUTPATIENT
Start: 2018-11-07 | End: 2018-11-07

## 2018-11-07 RX ORDER — MORPHINE SULFATE 2 MG/ML
2 INJECTION, SOLUTION INTRAMUSCULAR; INTRAVENOUS ONCE
Status: COMPLETED | OUTPATIENT
Start: 2018-11-07 | End: 2018-11-07

## 2018-11-07 RX ADMIN — SODIUM CHLORIDE 1000 ML: 9 INJECTION, SOLUTION INTRAVENOUS at 22:25

## 2018-11-07 RX ADMIN — MORPHINE SULFATE 2 MG: 2 INJECTION, SOLUTION INTRAMUSCULAR; INTRAVENOUS at 20:38

## 2018-11-07 RX ADMIN — IOPAMIDOL 75 ML: 755 INJECTION, SOLUTION INTRAVENOUS at 21:54

## 2018-11-07 RX ADMIN — KETOROLAC TROMETHAMINE 30 MG: 30 INJECTION, SOLUTION INTRAMUSCULAR at 22:02

## 2018-11-07 RX ADMIN — CEFTRIAXONE 1 G: 1 INJECTION, POWDER, FOR SOLUTION INTRAMUSCULAR; INTRAVENOUS at 22:30

## 2018-11-07 RX ADMIN — ONDANSETRON 4 MG: 2 INJECTION, SOLUTION INTRAMUSCULAR; INTRAVENOUS at 20:38

## 2018-11-07 RX ADMIN — DICYCLOMINE HYDROCHLORIDE 10 MG: 10 CAPSULE ORAL at 20:38

## 2018-11-07 ASSESSMENT — ENCOUNTER SYMPTOMS
EYE DISCHARGE: 0
WHEEZING: 0
SHORTNESS OF BREATH: 0
BLOOD IN STOOL: 0
NAUSEA: 1
SORE THROAT: 0
DIARRHEA: 0
BACK PAIN: 0
VOMITING: 1
CONSTIPATION: 0
COUGH: 0
EYE REDNESS: 0
CHEST TIGHTNESS: 0
ABDOMINAL PAIN: 1
RHINORRHEA: 0

## 2018-11-07 ASSESSMENT — PAIN DESCRIPTION - LOCATION: LOCATION: ABDOMEN

## 2018-11-07 ASSESSMENT — PAIN DESCRIPTION - PAIN TYPE: TYPE: ACUTE PAIN

## 2018-11-07 ASSESSMENT — PAIN SCALES - GENERAL
PAINLEVEL_OUTOF10: 10
PAINLEVEL_OUTOF10: 10

## 2018-11-07 ASSESSMENT — PAIN DESCRIPTION - ORIENTATION: ORIENTATION: RIGHT;LOWER

## 2018-11-09 ENCOUNTER — OFFICE VISIT (OUTPATIENT)
Dept: UROLOGY | Age: 23
End: 2018-11-09
Payer: MEDICARE

## 2018-11-09 VITALS
HEIGHT: 66 IN | WEIGHT: 232 LBS | TEMPERATURE: 98.1 F | BODY MASS INDEX: 37.28 KG/M2 | SYSTOLIC BLOOD PRESSURE: 120 MMHG | DIASTOLIC BLOOD PRESSURE: 78 MMHG

## 2018-11-09 DIAGNOSIS — N20.1 URETERAL CALCULUS: ICD-10-CM

## 2018-11-09 DIAGNOSIS — N20.0 RENAL CALCULUS, RIGHT: Primary | ICD-10-CM

## 2018-11-09 LAB
CULTURE: NORMAL
Lab: NORMAL
SPECIMEN DESCRIPTION: NORMAL
STATUS: NORMAL

## 2018-11-09 PROCEDURE — 99204 OFFICE O/P NEW MOD 45 MIN: CPT | Performed by: UROLOGY

## 2018-11-09 PROCEDURE — 1036F TOBACCO NON-USER: CPT | Performed by: UROLOGY

## 2018-11-09 PROCEDURE — G8417 CALC BMI ABV UP PARAM F/U: HCPCS | Performed by: UROLOGY

## 2018-11-09 PROCEDURE — G8484 FLU IMMUNIZE NO ADMIN: HCPCS | Performed by: UROLOGY

## 2018-11-09 PROCEDURE — G8427 DOCREV CUR MEDS BY ELIG CLIN: HCPCS | Performed by: UROLOGY

## 2018-11-09 ASSESSMENT — ENCOUNTER SYMPTOMS
BACK PAIN: 0
WHEEZING: 0
VOMITING: 0
COUGH: 0
NAUSEA: 0
EYE PAIN: 0
COLOR CHANGE: 0
EYE REDNESS: 0
ABDOMINAL PAIN: 0
SHORTNESS OF BREATH: 0

## 2018-11-09 NOTE — PROGRESS NOTES
Patient instructed on the pre-operative, intra-operative, and post-operative process. Patient's surgery arrival time to the hospital and surgery start time confirmed for the day of surgery. Patient instructed on NPO status. Medication instructions reviewed with patient. Pre operative instruction sheet reviewed with patient per PAT phone interview using [employer]  #[phone].

## 2018-11-13 ENCOUNTER — ANESTHESIA EVENT (OUTPATIENT)
Dept: OPERATING ROOM | Age: 23
End: 2018-11-13
Payer: MEDICARE

## 2018-11-14 ENCOUNTER — HOSPITAL ENCOUNTER (OUTPATIENT)
Age: 23
Setting detail: OUTPATIENT SURGERY
Discharge: HOME | End: 2018-11-14
Attending: UROLOGY | Admitting: UROLOGY
Payer: MEDICARE

## 2018-11-14 ENCOUNTER — APPOINTMENT (OUTPATIENT)
Dept: GENERAL RADIOLOGY | Age: 23
End: 2018-11-14
Attending: UROLOGY
Payer: MEDICARE

## 2018-11-14 ENCOUNTER — ANESTHESIA (OUTPATIENT)
Dept: OPERATING ROOM | Age: 23
End: 2018-11-14
Payer: MEDICARE

## 2018-11-14 VITALS
DIASTOLIC BLOOD PRESSURE: 73 MMHG | OXYGEN SATURATION: 98 % | SYSTOLIC BLOOD PRESSURE: 119 MMHG | RESPIRATION RATE: 6 BRPM | TEMPERATURE: 97.1 F

## 2018-11-14 VITALS
DIASTOLIC BLOOD PRESSURE: 79 MMHG | SYSTOLIC BLOOD PRESSURE: 128 MMHG | TEMPERATURE: 97 F | OXYGEN SATURATION: 100 % | WEIGHT: 232 LBS | HEIGHT: 66 IN | BODY MASS INDEX: 37.28 KG/M2 | HEART RATE: 75 BPM | RESPIRATION RATE: 16 BRPM

## 2018-11-14 PROCEDURE — 7100000010 HC PHASE II RECOVERY - FIRST 15 MIN: Performed by: UROLOGY

## 2018-11-14 PROCEDURE — 2500000003 HC RX 250 WO HCPCS: Performed by: NURSE ANESTHETIST, CERTIFIED REGISTERED

## 2018-11-14 PROCEDURE — 3600000004 HC SURGERY LEVEL 4 BASE: Performed by: UROLOGY

## 2018-11-14 PROCEDURE — 74018 RADEX ABDOMEN 1 VIEW: CPT

## 2018-11-14 PROCEDURE — C2617 STENT, NON-COR, TEM W/O DEL: HCPCS | Performed by: UROLOGY

## 2018-11-14 PROCEDURE — 2580000003 HC RX 258: Performed by: UROLOGY

## 2018-11-14 PROCEDURE — C1769 GUIDE WIRE: HCPCS | Performed by: UROLOGY

## 2018-11-14 PROCEDURE — 3600000014 HC SURGERY LEVEL 4 ADDTL 15MIN: Performed by: UROLOGY

## 2018-11-14 PROCEDURE — 2709999900 HC NON-CHARGEABLE SUPPLY: Performed by: UROLOGY

## 2018-11-14 PROCEDURE — 6360000002 HC RX W HCPCS: Performed by: UROLOGY

## 2018-11-14 PROCEDURE — 3700000001 HC ADD 15 MINUTES (ANESTHESIA): Performed by: UROLOGY

## 2018-11-14 PROCEDURE — 6370000000 HC RX 637 (ALT 250 FOR IP): Performed by: UROLOGY

## 2018-11-14 PROCEDURE — 7100000011 HC PHASE II RECOVERY - ADDTL 15 MIN: Performed by: UROLOGY

## 2018-11-14 PROCEDURE — 6360000002 HC RX W HCPCS: Performed by: NURSE ANESTHETIST, CERTIFIED REGISTERED

## 2018-11-14 PROCEDURE — 7100000000 HC PACU RECOVERY - FIRST 15 MIN: Performed by: UROLOGY

## 2018-11-14 PROCEDURE — 3700000000 HC ANESTHESIA ATTENDED CARE: Performed by: UROLOGY

## 2018-11-14 PROCEDURE — C1758 CATHETER, URETERAL: HCPCS | Performed by: UROLOGY

## 2018-11-14 PROCEDURE — 7100000001 HC PACU RECOVERY - ADDTL 15 MIN: Performed by: UROLOGY

## 2018-11-14 DEVICE — URETERAL STENT
Type: IMPLANTABLE DEVICE | Status: FUNCTIONAL
Brand: PERCUFLEX™ PLUS

## 2018-11-14 RX ORDER — ACETAMINOPHEN 500 MG
500 TABLET ORAL EVERY 6 HOURS PRN
Status: ON HOLD | COMMUNITY
End: 2021-07-09 | Stop reason: HOSPADM

## 2018-11-14 RX ORDER — FENTANYL CITRATE 50 UG/ML
INJECTION, SOLUTION INTRAMUSCULAR; INTRAVENOUS PRN
Status: DISCONTINUED | OUTPATIENT
Start: 2018-11-14 | End: 2018-11-14 | Stop reason: SDUPTHER

## 2018-11-14 RX ORDER — ONDANSETRON 2 MG/ML
4 INJECTION INTRAMUSCULAR; INTRAVENOUS
Status: DISCONTINUED | OUTPATIENT
Start: 2018-11-14 | End: 2018-11-14 | Stop reason: HOSPADM

## 2018-11-14 RX ORDER — HYDROCODONE BITARTRATE AND ACETAMINOPHEN 5; 325 MG/1; MG/1
1 TABLET ORAL
Status: COMPLETED | OUTPATIENT
Start: 2018-11-14 | End: 2018-11-14

## 2018-11-14 RX ORDER — DEXAMETHASONE SODIUM PHOSPHATE 4 MG/ML
INJECTION, SOLUTION INTRA-ARTICULAR; INTRALESIONAL; INTRAMUSCULAR; INTRAVENOUS; SOFT TISSUE PRN
Status: DISCONTINUED | OUTPATIENT
Start: 2018-11-14 | End: 2018-11-14 | Stop reason: SDUPTHER

## 2018-11-14 RX ORDER — ONDANSETRON 2 MG/ML
INJECTION INTRAMUSCULAR; INTRAVENOUS PRN
Status: DISCONTINUED | OUTPATIENT
Start: 2018-11-14 | End: 2018-11-14 | Stop reason: SDUPTHER

## 2018-11-14 RX ORDER — SODIUM CHLORIDE, SODIUM LACTATE, POTASSIUM CHLORIDE, CALCIUM CHLORIDE 600; 310; 30; 20 MG/100ML; MG/100ML; MG/100ML; MG/100ML
INJECTION, SOLUTION INTRAVENOUS CONTINUOUS
Status: DISCONTINUED | OUTPATIENT
Start: 2018-11-14 | End: 2018-11-14 | Stop reason: HOSPADM

## 2018-11-14 RX ORDER — FENTANYL CITRATE 50 UG/ML
25 INJECTION, SOLUTION INTRAMUSCULAR; INTRAVENOUS EVERY 5 MIN PRN
Status: DISCONTINUED | OUTPATIENT
Start: 2018-11-14 | End: 2018-11-14 | Stop reason: HOSPADM

## 2018-11-14 RX ORDER — DIMENHYDRINATE 50 MG/1
50 TABLET ORAL ONCE
Status: COMPLETED | OUTPATIENT
Start: 2018-11-14 | End: 2018-11-14

## 2018-11-14 RX ORDER — LIDOCAINE HYDROCHLORIDE 20 MG/ML
INJECTION, SOLUTION EPIDURAL; INFILTRATION; INTRACAUDAL; PERINEURAL PRN
Status: DISCONTINUED | OUTPATIENT
Start: 2018-11-14 | End: 2018-11-14 | Stop reason: SDUPTHER

## 2018-11-14 RX ORDER — PROPOFOL 10 MG/ML
INJECTION, EMULSION INTRAVENOUS PRN
Status: DISCONTINUED | OUTPATIENT
Start: 2018-11-14 | End: 2018-11-14 | Stop reason: SDUPTHER

## 2018-11-14 RX ORDER — KETOROLAC TROMETHAMINE 30 MG/ML
INJECTION, SOLUTION INTRAMUSCULAR; INTRAVENOUS PRN
Status: DISCONTINUED | OUTPATIENT
Start: 2018-11-14 | End: 2018-11-14 | Stop reason: SDUPTHER

## 2018-11-14 RX ORDER — ACETAMINOPHEN 325 MG/1
650 TABLET ORAL ONCE
Status: COMPLETED | OUTPATIENT
Start: 2018-11-14 | End: 2018-11-14

## 2018-11-14 RX ORDER — MIDAZOLAM HYDROCHLORIDE 1 MG/ML
INJECTION INTRAMUSCULAR; INTRAVENOUS PRN
Status: DISCONTINUED | OUTPATIENT
Start: 2018-11-14 | End: 2018-11-14 | Stop reason: SDUPTHER

## 2018-11-14 RX ORDER — MEPERIDINE HYDROCHLORIDE 50 MG/ML
12.5 INJECTION INTRAMUSCULAR; INTRAVENOUS; SUBCUTANEOUS EVERY 5 MIN PRN
Status: DISCONTINUED | OUTPATIENT
Start: 2018-11-14 | End: 2018-11-14 | Stop reason: HOSPADM

## 2018-11-14 RX ORDER — CIPROFLOXACIN 2 MG/ML
400 INJECTION, SOLUTION INTRAVENOUS
Status: COMPLETED | OUTPATIENT
Start: 2018-11-14 | End: 2018-11-14

## 2018-11-14 RX ADMIN — DEXAMETHASONE SODIUM PHOSPHATE 8 MG: 4 INJECTION, SOLUTION INTRAMUSCULAR; INTRAVENOUS at 15:25

## 2018-11-14 RX ADMIN — ONDANSETRON 4 MG: 2 INJECTION INTRAMUSCULAR; INTRAVENOUS at 15:42

## 2018-11-14 RX ADMIN — HYDROCODONE BITARTRATE AND ACETAMINOPHEN 1 TABLET: 5; 325 TABLET ORAL at 16:34

## 2018-11-14 RX ADMIN — MIDAZOLAM HYDROCHLORIDE 2 MG: 1 INJECTION, SOLUTION INTRAMUSCULAR; INTRAVENOUS at 15:14

## 2018-11-14 RX ADMIN — SODIUM CHLORIDE, POTASSIUM CHLORIDE, SODIUM LACTATE AND CALCIUM CHLORIDE: 600; 310; 30; 20 INJECTION, SOLUTION INTRAVENOUS at 14:03

## 2018-11-14 RX ADMIN — ACETAMINOPHEN 650 MG: 325 TABLET ORAL at 14:06

## 2018-11-14 RX ADMIN — FENTANYL CITRATE 50 MCG: 50 INJECTION INTRAMUSCULAR; INTRAVENOUS at 15:20

## 2018-11-14 RX ADMIN — KETOROLAC TROMETHAMINE 30 MG: 30 INJECTION, SOLUTION INTRAMUSCULAR; INTRAVENOUS at 15:42

## 2018-11-14 RX ADMIN — PROPOFOL 200 MG: 10 INJECTION, EMULSION INTRAVENOUS at 15:20

## 2018-11-14 RX ADMIN — DIMENHYDRINATE 50 MG: 50 TABLET ORAL at 14:06

## 2018-11-14 RX ADMIN — SODIUM CHLORIDE, POTASSIUM CHLORIDE, SODIUM LACTATE AND CALCIUM CHLORIDE: 600; 310; 30; 20 INJECTION, SOLUTION INTRAVENOUS at 15:46

## 2018-11-14 RX ADMIN — CIPROFLOXACIN 400 MG: 2 INJECTION, SOLUTION INTRAVENOUS at 15:13

## 2018-11-14 RX ADMIN — FENTANYL CITRATE 50 MCG: 50 INJECTION INTRAMUSCULAR; INTRAVENOUS at 15:25

## 2018-11-14 RX ADMIN — LIDOCAINE HYDROCHLORIDE 100 MG: 20 INJECTION, SOLUTION EPIDURAL; INFILTRATION; INTRACAUDAL at 15:20

## 2018-11-14 ASSESSMENT — PULMONARY FUNCTION TESTS
PIF_VALUE: 16
PIF_VALUE: 16
PIF_VALUE: 1
PIF_VALUE: 10
PIF_VALUE: 16
PIF_VALUE: 16
PIF_VALUE: 0
PIF_VALUE: 13
PIF_VALUE: 13
PIF_VALUE: 16
PIF_VALUE: 0
PIF_VALUE: 10
PIF_VALUE: 11
PIF_VALUE: 10
PIF_VALUE: 0
PIF_VALUE: 13
PIF_VALUE: 10
PIF_VALUE: 2
PIF_VALUE: 16
PIF_VALUE: 13
PIF_VALUE: 1
PIF_VALUE: 13
PIF_VALUE: 13
PIF_VALUE: 18
PIF_VALUE: 17
PIF_VALUE: 13
PIF_VALUE: 5
PIF_VALUE: 13
PIF_VALUE: 13
PIF_VALUE: 1
PIF_VALUE: 15
PIF_VALUE: 13
PIF_VALUE: 3
PIF_VALUE: 10
PIF_VALUE: 16
PIF_VALUE: 3
PIF_VALUE: 16
PIF_VALUE: 16

## 2018-11-14 ASSESSMENT — PAIN - FUNCTIONAL ASSESSMENT: PAIN_FUNCTIONAL_ASSESSMENT: 0-10

## 2018-11-14 ASSESSMENT — PAIN DESCRIPTION - LOCATION
LOCATION: GROIN
LOCATION: GROIN

## 2018-11-14 ASSESSMENT — PAIN DESCRIPTION - DESCRIPTORS
DESCRIPTORS: BURNING
DESCRIPTORS: BURNING

## 2018-11-14 ASSESSMENT — PAIN SCALES - GENERAL
PAINLEVEL_OUTOF10: 8
PAINLEVEL_OUTOF10: 4

## 2018-11-14 ASSESSMENT — PAIN DESCRIPTION - PAIN TYPE: TYPE: SURGICAL PAIN

## 2018-11-14 NOTE — ANESTHESIA PRE PROCEDURE
yanna     had renal biopsy    Irritable bowel syndrome     Kidney disease     Microscopic hematuria     Obesity     Proteinuria     Sensorineural hearing loss     Thin basement membrane disease     Bx proven       Past Surgical History:        Procedure Laterality Date    COLONOSCOPY  2009    Dr. Nurys Blair Left 8/18/2015    KIDNEY BIOPSY  12/29/98    LAPAROSCOPY      URETEROSCOPY  8-    WISDOM TOOTH EXTRACTION      WRIST SURGERY  2010    cyst removal       Social History:    Social History   Substance Use Topics    Smoking status: Former Smoker     Types: Cigarettes     Quit date: 3/18/2014    Smokeless tobacco: Never Used      Comment: several cigarettes daily    Alcohol use No                                Counseling given: Not Answered      Vital Signs (Current):   Vitals:    11/09/18 1444 11/14/18 1356   BP:  (!) 144/99   Pulse:  61   Resp:  18   Temp:  36.1 °C (96.9 °F)   TempSrc:  Temporal   SpO2:  98%   Weight: 232 lb (105.2 kg) 232 lb (105.2 kg)   Height: 5' 6\" (1.676 m) 5' 6\" (1.676 m)                                              BP Readings from Last 3 Encounters:   11/14/18 (!) 144/99   11/09/18 120/78   11/07/18 118/74       NPO Status: Time of last liquid consumption: 2245                        Time of last solid consumption: 2045                        Date of last liquid consumption: 11/13/18                        Date of last solid food consumption: 11/13/18    BMI:   Wt Readings from Last 3 Encounters:   11/14/18 232 lb (105.2 kg)   11/09/18 232 lb (105.2 kg)   10/24/18 229 lb (103.9 kg)     Body mass index is 37.45 kg/m².     CBC:   Lab Results   Component Value Date    WBC 5.3 11/07/2018    RBC 4.33 11/07/2018    HGB 13.2 11/07/2018    HCT 40.1 11/07/2018    MCV 92.6 11/07/2018    RDW 11.5 11/07/2018     11/07/2018       CMP:   Lab Results   Component Value Date     11/07/2018    K 3.9 11/07/2018     11/07/2018    CO2 24 11/07/2018    BUN

## 2018-11-15 ENCOUNTER — TELEPHONE (OUTPATIENT)
Dept: UROLOGY | Age: 23
End: 2018-11-15

## 2018-11-15 NOTE — TELEPHONE ENCOUNTER
Patient called to report that she is having a lot of pain since her surgery yesterday. Patient s/p lithotripsy with stent placement. Patient says she took tylenol for pain. Patient advised to try OTC ibuprofen if not restrictions, use as prescribed on bottle. Alternate with tylenol. If pain becomes intolerable, and/or has fevers/chills, nausea/vomiting to go to the ER. Patient also advised to call office later today with update on how she is doing. Patient voiced understanding.

## 2018-11-15 NOTE — OP NOTE
the  proximal ureter, 6 mm. We were able to use a 200 micron laser fiber up  and ablated the stone adequately using a dusting setting. We did dust  the stone to sub-1 mm fragments. At this point in time, we removed the  scope examining the ureter on the way out. No significant ureteral  fragments persisted. We then left the Glidewire in place, placed a  cystoscope over the Glidewire and placed a 6-Kazakh 26-cm double-J  ureteral stent over the Glidewire up into the kidney. Glidewire was  removed. Proximal curl was confirmed via fluoroscopy. Distal curl was  confirmed via visualization. At this point in time, the bladder was  drained. She was then awoken from general anesthesia, transferred to  the [facility], and taken to the PACU in satisfactory condition by Nursing  and Anesthesia teams. PLAN:  The patient will be discharged home per PACU criteria and follow  up with us in two to three days for stent removal via string.         Bernard Timmons    D: 11/14/2018 15:52:45       T: 11/15/2018 1:39:03     [email or web address]  Job#: [id number]     Doc#: [id number]    CC:

## 2018-11-16 ENCOUNTER — OFFICE VISIT (OUTPATIENT)
Dept: UROLOGY | Age: 23
End: 2018-11-16
Payer: MEDICARE

## 2018-11-16 VITALS
BODY MASS INDEX: 37.45 KG/M2 | SYSTOLIC BLOOD PRESSURE: 112 MMHG | TEMPERATURE: 97.7 F | DIASTOLIC BLOOD PRESSURE: 82 MMHG | WEIGHT: 232 LBS

## 2018-11-16 DIAGNOSIS — T83.84XA PAIN DUE TO URETERAL STENT, INITIAL ENCOUNTER (HCC): ICD-10-CM

## 2018-11-16 DIAGNOSIS — N20.1 URETERAL CALCULUS: Primary | ICD-10-CM

## 2018-11-16 PROCEDURE — G8484 FLU IMMUNIZE NO ADMIN: HCPCS | Performed by: NURSE PRACTITIONER

## 2018-11-16 PROCEDURE — 1036F TOBACCO NON-USER: CPT | Performed by: NURSE PRACTITIONER

## 2018-11-16 PROCEDURE — 99214 OFFICE O/P EST MOD 30 MIN: CPT | Performed by: NURSE PRACTITIONER

## 2018-11-16 PROCEDURE — G8417 CALC BMI ABV UP PARAM F/U: HCPCS | Performed by: NURSE PRACTITIONER

## 2018-11-16 PROCEDURE — G8427 DOCREV CUR MEDS BY ELIG CLIN: HCPCS | Performed by: NURSE PRACTITIONER

## 2018-11-16 RX ORDER — HYDROCODONE BITARTRATE AND ACETAMINOPHEN 5; 325 MG/1; MG/1
1 TABLET ORAL EVERY 4 HOURS PRN
Qty: 18 TABLET | Refills: 0 | Status: SHIPPED | OUTPATIENT
Start: 2018-11-16 | End: 2018-11-19

## 2018-11-16 RX ORDER — TAMSULOSIN HYDROCHLORIDE 0.4 MG/1
0.4 CAPSULE ORAL EVERY EVENING
Qty: 30 CAPSULE | Refills: 0 | Status: SHIPPED | OUTPATIENT
Start: 2018-11-16 | End: 2019-02-12

## 2018-11-16 NOTE — PATIENT INSTRUCTIONS
You may experience waves of pain and/or nausea for the next 24-72 hrs. You may also experience burning with urination, frequency, urgency, bladder spasms, and blood in the urine. All of this should continue to improve over the next several days. The blood in the urine can last up to two weeks. 1) take ibuprofen (motrin) 600 mg (3 of the 200mg tabs) every 6 hours WITH FOOD for the next 72 hours. 2) take Flomax for the next 72 hours. 3) drink at least 80 oz fluid (water, juice, Gatorade - NOT tea, coffee, soda pop) daily    For pain use norco as directed as needed for severe pain not controlled by ibuprofen. Call our office [phone] or go to ER (if after normal office hours) if you develop fever, intractable vomiting, severe/intolerable pain. You will need to follow-up in our office in 6 weeks with an xray (MARQUISE) prior.

## 2018-11-17 ASSESSMENT — ENCOUNTER SYMPTOMS
NAUSEA: 0
CONSTIPATION: 0
COLOR CHANGE: 0
BACK PAIN: 0
EYE REDNESS: 0
EYE PAIN: 0
SHORTNESS OF BREATH: 0
WHEEZING: 0
COUGH: 0
VOMITING: 0
ABDOMINAL PAIN: 0

## 2019-01-07 ENCOUNTER — HOSPITAL ENCOUNTER (OUTPATIENT)
Dept: GENERAL RADIOLOGY | Age: 24
Discharge: HOME | End: 2019-01-09
Payer: MEDICARE

## 2019-01-07 ENCOUNTER — HOSPITAL ENCOUNTER (OUTPATIENT)
Age: 24
Discharge: HOME | End: 2019-01-09
Payer: MEDICARE

## 2019-01-07 DIAGNOSIS — N20.1 URETERAL CALCULUS: ICD-10-CM

## 2019-01-07 PROCEDURE — 74018 RADEX ABDOMEN 1 VIEW: CPT

## 2019-01-16 ENCOUNTER — NURSE ONLY (OUTPATIENT)
Dept: OBGYN | Age: 24
End: 2019-01-16
Payer: MEDICARE

## 2019-01-16 VITALS — HEIGHT: 66 IN | WEIGHT: 219.4 LBS | BODY MASS INDEX: 35.26 KG/M2

## 2019-01-16 DIAGNOSIS — N92.6 IRREGULAR BLEEDING: Primary | ICD-10-CM

## 2019-01-16 PROCEDURE — 96372 THER/PROPH/DIAG INJ SC/IM: CPT | Performed by: OBSTETRICS & GYNECOLOGY

## 2019-01-16 RX ORDER — MEDROXYPROGESTERONE ACETATE 150 MG/ML
150 INJECTION, SUSPENSION INTRAMUSCULAR ONCE
Status: COMPLETED | OUTPATIENT
Start: 2019-01-16 | End: 2019-01-16

## 2019-01-16 RX ADMIN — MEDROXYPROGESTERONE ACETATE 150 MG: 150 INJECTION, SUSPENSION INTRAMUSCULAR at 10:14

## 2019-04-10 ENCOUNTER — NURSE ONLY (OUTPATIENT)
Dept: OBGYN | Age: 24
End: 2019-04-10
Payer: MEDICARE

## 2019-04-10 VITALS
HEIGHT: 66 IN | SYSTOLIC BLOOD PRESSURE: 118 MMHG | BODY MASS INDEX: 35.48 KG/M2 | DIASTOLIC BLOOD PRESSURE: 78 MMHG | WEIGHT: 220.8 LBS

## 2019-04-10 DIAGNOSIS — N92.6 IRREGULAR BLEEDING: Primary | ICD-10-CM

## 2019-04-10 PROCEDURE — 96372 THER/PROPH/DIAG INJ SC/IM: CPT | Performed by: ADVANCED PRACTICE MIDWIFE

## 2019-04-10 RX ORDER — MEDROXYPROGESTERONE ACETATE 150 MG/ML
150 INJECTION, SUSPENSION INTRAMUSCULAR ONCE
Status: COMPLETED | OUTPATIENT
Start: 2019-04-10 | End: 2019-04-10

## 2019-04-10 RX ADMIN — MEDROXYPROGESTERONE ACETATE 150 MG: 150 INJECTION, SUSPENSION INTRAMUSCULAR at 10:11

## 2019-04-10 NOTE — PROGRESS NOTES
Nurse visit Depo    Date of service: 4/10/2019    Manasa Orion  Is a 21 y.o.  female    PT's PCP is: Sarai Ortega MD     : 1995                                             Subjective:       No LMP recorded. Patient has had an injection. Allergies: Patient has no known allergies. Chief Complaint   Patient presents with    Injections     Depo injection - office supplied - left arm       Last yearly: 2018    Last pap:     Last HPV:  ( if due for pap schedule pap)    LAST DEPO: 19 (  if past 13 weeks and not on period please talk with provider)    PE:  Vital Signs  Blood pressure 118/78, height 5' 6\" (1.676 m), weight 220 lb 12.8 oz (100.2 kg), not currently breastfeeding. Labs:    No results found for this visit on 04/10/19. Yes  PT denies fever, chills, nausea and vomiting                            Assessment and Plan          Diagnosis Orders   1.  Irregular bleeding  medroxyPROGESTERone (DEPO-PROVERA) injection 150 mg         Depo was: Office supplied      NURSE: RICKY Russell

## 2019-07-08 ENCOUNTER — NURSE ONLY (OUTPATIENT)
Dept: OBGYN | Age: 24
End: 2019-07-08
Payer: MEDICARE

## 2019-07-08 VITALS — DIASTOLIC BLOOD PRESSURE: 88 MMHG | SYSTOLIC BLOOD PRESSURE: 134 MMHG | BODY MASS INDEX: 36.15 KG/M2 | WEIGHT: 224 LBS

## 2019-07-08 DIAGNOSIS — N92.6 IRREGULAR BLEEDING: Primary | ICD-10-CM

## 2019-07-08 PROCEDURE — 96372 THER/PROPH/DIAG INJ SC/IM: CPT | Performed by: ADVANCED PRACTICE MIDWIFE

## 2019-07-08 RX ORDER — MEDROXYPROGESTERONE ACETATE 150 MG/ML
150 INJECTION, SUSPENSION INTRAMUSCULAR ONCE
Status: COMPLETED | OUTPATIENT
Start: 2019-07-08 | End: 2019-07-08

## 2019-07-08 RX ADMIN — MEDROXYPROGESTERONE ACETATE 150 MG: 150 INJECTION, SUSPENSION INTRAMUSCULAR at 16:22

## 2019-07-24 ENCOUNTER — HOSPITAL ENCOUNTER (EMERGENCY)
Age: 24
Discharge: HOME | End: 2019-07-24
Payer: MEDICARE

## 2019-07-24 ENCOUNTER — APPOINTMENT (OUTPATIENT)
Dept: CT IMAGING | Age: 24
End: 2019-07-24
Payer: MEDICARE

## 2019-07-24 VITALS
HEART RATE: 62 BPM | SYSTOLIC BLOOD PRESSURE: 138 MMHG | TEMPERATURE: 98.1 F | DIASTOLIC BLOOD PRESSURE: 74 MMHG | RESPIRATION RATE: 18 BRPM | WEIGHT: 220 LBS | BODY MASS INDEX: 35.51 KG/M2 | OXYGEN SATURATION: 99 %

## 2019-07-24 DIAGNOSIS — R10.9 NON-SURGICAL ABDOMINAL PAIN: Primary | ICD-10-CM

## 2019-07-24 LAB
-: NORMAL
ABSOLUTE EOS #: 0.12 K/UL (ref 0–0.44)
ABSOLUTE IMMATURE GRANULOCYTE: <0.03 K/UL (ref 0–0.3)
ABSOLUTE LYMPH #: 1.51 K/UL (ref 1.1–3.7)
ABSOLUTE MONO #: 0.45 K/UL (ref 0.1–1.2)
ALBUMIN SERPL-MCNC: 4.3 G/DL (ref 3.5–5.2)
ALBUMIN/GLOBULIN RATIO: 1.5 (ref 1–2.5)
ALP BLD-CCNC: 66 U/L (ref 35–104)
ALT SERPL-CCNC: 9 U/L (ref 5–33)
AMORPHOUS: NORMAL
ANION GAP SERPL CALCULATED.3IONS-SCNC: 11 MMOL/L (ref 9–17)
AST SERPL-CCNC: 15 U/L
BACTERIA: NORMAL
BASOPHILS # BLD: 1 % (ref 0–2)
BASOPHILS ABSOLUTE: 0.03 K/UL (ref 0–0.2)
BILIRUB SERPL-MCNC: 0.51 MG/DL (ref 0.3–1.2)
BILIRUBIN URINE: NEGATIVE
BUN BLDV-MCNC: 11 MG/DL (ref 6–20)
BUN/CREAT BLD: 13 (ref 9–20)
CALCIUM SERPL-MCNC: 9.4 MG/DL (ref 8.6–10.4)
CASTS UA: NORMAL /LPF
CHLORIDE BLD-SCNC: 105 MMOL/L (ref 98–107)
CO2: 23 MMOL/L (ref 20–31)
COLOR: YELLOW
COMMENT UA: NORMAL
CREAT SERPL-MCNC: 0.82 MG/DL (ref 0.5–0.9)
CRYSTALS, UA: NORMAL /HPF
DIFFERENTIAL TYPE: ABNORMAL
EOSINOPHILS RELATIVE PERCENT: 2 % (ref 1–4)
EPITHELIAL CELLS UA: NORMAL /HPF (ref 0–25)
GFR AFRICAN AMERICAN: >60 ML/MIN
GFR NON-AFRICAN AMERICAN: >60 ML/MIN
GFR SERPL CREATININE-BSD FRML MDRD: ABNORMAL ML/MIN/{1.73_M2}
GFR SERPL CREATININE-BSD FRML MDRD: ABNORMAL ML/MIN/{1.73_M2}
GLUCOSE BLD-MCNC: 101 MG/DL (ref 70–99)
GLUCOSE URINE: NEGATIVE
HCG(URINE) PREGNANCY TEST: NEGATIVE
HCT VFR BLD CALC: 40 % (ref 36.3–47.1)
HEMOGLOBIN: 13.7 G/DL (ref 11.9–15.1)
IMMATURE GRANULOCYTES: 0 %
KETONES, URINE: NEGATIVE
LEUKOCYTE ESTERASE, URINE: NEGATIVE
LIPASE: 27 U/L (ref 13–60)
LYMPHOCYTES # BLD: 25 % (ref 24–43)
MCH RBC QN AUTO: 30.6 PG (ref 25.2–33.5)
MCHC RBC AUTO-ENTMCNC: 34.3 G/DL (ref 28.4–34.8)
MCV RBC AUTO: 89.5 FL (ref 82.6–102.9)
MONOCYTES # BLD: 8 % (ref 3–12)
MUCUS: NORMAL
NITRITE, URINE: NEGATIVE
NRBC AUTOMATED: 0 PER 100 WBC
OTHER OBSERVATIONS UA: NORMAL
PDW BLD-RTO: 11.4 % (ref 11.8–14.4)
PH UA: 6 (ref 5–9)
PLATELET # BLD: 298 K/UL (ref 138–453)
PLATELET ESTIMATE: ABNORMAL
PMV BLD AUTO: 9.7 FL (ref 8.1–13.5)
POTASSIUM SERPL-SCNC: 3.8 MMOL/L (ref 3.7–5.3)
PROTEIN UA: NEGATIVE
RBC # BLD: 4.47 M/UL (ref 3.95–5.11)
RBC # BLD: ABNORMAL 10*6/UL
RBC UA: NORMAL /HPF (ref 0–2)
RENAL EPITHELIAL, UA: NORMAL /HPF
SEG NEUTROPHILS: 64 % (ref 36–65)
SEGMENTED NEUTROPHILS ABSOLUTE COUNT: 3.82 K/UL (ref 1.5–8.1)
SODIUM BLD-SCNC: 139 MMOL/L (ref 135–144)
SPECIFIC GRAVITY UA: 1.02 (ref 1.01–1.02)
TOTAL PROTEIN: 7.2 G/DL (ref 6.4–8.3)
TRICHOMONAS: NORMAL
TURBIDITY: CLEAR
URINE HGB: NEGATIVE
UROBILINOGEN, URINE: NORMAL
WBC # BLD: 6 K/UL (ref 3.5–11.3)
WBC # BLD: ABNORMAL 10*3/UL
WBC UA: NORMAL /HPF (ref 0–5)
YEAST: NORMAL

## 2019-07-24 PROCEDURE — 81001 URINALYSIS AUTO W/SCOPE: CPT

## 2019-07-24 PROCEDURE — 96375 TX/PRO/DX INJ NEW DRUG ADDON: CPT

## 2019-07-24 PROCEDURE — 6360000004 HC RX CONTRAST MEDICATION: Performed by: PHYSICIAN ASSISTANT

## 2019-07-24 PROCEDURE — 6360000002 HC RX W HCPCS: Performed by: PHYSICIAN ASSISTANT

## 2019-07-24 PROCEDURE — 81025 URINE PREGNANCY TEST: CPT

## 2019-07-24 PROCEDURE — 83690 ASSAY OF LIPASE: CPT

## 2019-07-24 PROCEDURE — 80053 COMPREHEN METABOLIC PANEL: CPT

## 2019-07-24 PROCEDURE — 99284 EMERGENCY DEPT VISIT MOD MDM: CPT

## 2019-07-24 PROCEDURE — 85025 COMPLETE CBC W/AUTO DIFF WBC: CPT

## 2019-07-24 PROCEDURE — 36415 COLL VENOUS BLD VENIPUNCTURE: CPT

## 2019-07-24 PROCEDURE — 96374 THER/PROPH/DIAG INJ IV PUSH: CPT

## 2019-07-24 PROCEDURE — 74177 CT ABD & PELVIS W/CONTRAST: CPT

## 2019-07-24 RX ORDER — ONDANSETRON 2 MG/ML
4 INJECTION INTRAMUSCULAR; INTRAVENOUS ONCE
Status: COMPLETED | OUTPATIENT
Start: 2019-07-24 | End: 2019-07-24

## 2019-07-24 RX ORDER — KETOROLAC TROMETHAMINE 15 MG/ML
15 INJECTION, SOLUTION INTRAMUSCULAR; INTRAVENOUS ONCE
Status: COMPLETED | OUTPATIENT
Start: 2019-07-24 | End: 2019-07-24

## 2019-07-24 RX ORDER — DICYCLOMINE HYDROCHLORIDE 10 MG/1
10 CAPSULE ORAL
Qty: 12 CAPSULE | Refills: 0 | Status: SHIPPED | OUTPATIENT
Start: 2019-07-24 | End: 2021-04-09

## 2019-07-24 RX ORDER — ONDANSETRON 4 MG/1
4 TABLET, ORALLY DISINTEGRATING ORAL EVERY 8 HOURS PRN
Qty: 12 TABLET | Refills: 0 | Status: SHIPPED | OUTPATIENT
Start: 2019-07-24 | End: 2020-02-07 | Stop reason: ALTCHOICE

## 2019-07-24 RX ADMIN — IOPAMIDOL 75 ML: 755 INJECTION, SOLUTION INTRAVENOUS at 13:31

## 2019-07-24 RX ADMIN — ONDANSETRON 4 MG: 2 INJECTION INTRAMUSCULAR; INTRAVENOUS at 13:41

## 2019-07-24 RX ADMIN — KETOROLAC TROMETHAMINE 15 MG: 15 INJECTION, SOLUTION INTRAMUSCULAR; INTRAVENOUS at 13:41

## 2019-07-24 ASSESSMENT — ENCOUNTER SYMPTOMS
BACK PAIN: 0
SHORTNESS OF BREATH: 0
EYE DISCHARGE: 0
CONSTIPATION: 0
ABDOMINAL PAIN: 1
COUGH: 0
WHEEZING: 0
VOMITING: 1
NAUSEA: 1
BLOOD IN STOOL: 0
DIARRHEA: 0
CHEST TIGHTNESS: 0
EYE REDNESS: 0
SORE THROAT: 0
RHINORRHEA: 0

## 2019-07-24 ASSESSMENT — PAIN SCALES - GENERAL
PAINLEVEL_OUTOF10: 10
PAINLEVEL_OUTOF10: 6
PAINLEVEL_OUTOF10: 10

## 2019-07-24 ASSESSMENT — PAIN DESCRIPTION - PAIN TYPE: TYPE: ACUTE PAIN

## 2019-07-24 ASSESSMENT — PAIN DESCRIPTION - DESCRIPTORS: DESCRIPTORS: CONSTANT;SHARP

## 2019-07-24 ASSESSMENT — PAIN DESCRIPTION - LOCATION: LOCATION: ABDOMEN

## 2019-07-24 ASSESSMENT — PAIN DESCRIPTION - ORIENTATION: ORIENTATION: LOWER

## 2019-07-24 NOTE — ED PROVIDER NOTES
[facility] ED  eMERGENCY dEPARTMENT eNCOUnter      Pt Name: Rafaela Jaime  MRN: [id number]  [place] 1995  Date of evaluation: 7/24/2019  Provider: Kenneth Hoover Dr     Chief Complaint   Patient presents with    Abdominal Pain     mid abd pain, onset this am    Emesis     onset this am         HISTORY OF PRESENT ILLNESS   (Location/Symptom, Timing/Onset, Context/Setting,Quality, Duration, Modifying Factors, Severity)  Note limiting factors. Rafaela Jaime is a19 y.o. female who presents to the emergency department with complaints of mid abdominal discomfort onset this morning. Associated complaints include nausea and vomiting. This HPI is done with  over video phone. Patient reports that she has had intermittent ongoing pain for several weeks. States today at work it became worse and it is in her mid abdomen. She denies any lower pelvic symptoms which were initially mentioned in triage again my HPI is done with . She reports everything is just above her umbilicus. She denies any dysuria or hematuria. Reports she has had similar pain in the past when she was told she had a kidney stone. She denies any fever or chills. Denies any chest pain or shortness of breath. Denies any dizziness or headache. She does not take anything for symptoms. She has no other complaints at this time. HPI    Nursing Notes werereviewed. REVIEW OF SYSTEMS    (2-9 systems for level 4, 10 or more for level 5)     Review of Systems   Constitutional: Negative for chills, diaphoresis and fever. HENT: Negative for congestion, ear pain, rhinorrhea and sore throat. Eyes: Negative for discharge, redness and visual disturbance. Respiratory: Negative for cough, chest tightness, shortness of breath and wheezing. Cardiovascular: Negative for chest pain and palpitations. Gastrointestinal: Positive for abdominal pain, nausea and vomiting.  Negative for blood are read by the radiologist. Plain radiographic images are visualized and preliminarily interpreted by the emergency physician with the below findings:      Interpretationper the Radiologist below, if available at the time of this note:    CT ABDOMEN PELVIS W IV CONTRAST Additional Contrast? None   Final Result   1. Bilateral punctate nephrolithiasis. No evidence of obstructive uropathy   at this time. 2.  Normal appendix. ED BEDSIDE ULTRASOUND:   Performed by ED Physician - none    LABS:  Labs Reviewed   CBC WITH AUTO DIFFERENTIAL - Abnormal; Notable for the following components:       Result Value    RDW 11.4 (*)     All other components within normal limits   COMPREHENSIVE METABOLIC PANEL - Abnormal; Notable for the following components:    Glucose 101 (*)     All other components within normal limits   URINE RT REFLEX TO CULTURE   PREGNANCY, URINE   LIPASE   MICROSCOPIC URINALYSIS       All other labs were within normal range or not returned as of this dictation. EMERGENCY DEPARTMENT COURSE and DIFFERENTIAL DIAGNOSIS/MDM:   Vitals:    Vitals:    07/24/19 1132   BP: (!) 162/108   Pulse: 62   Resp: 18   Temp: 98.1 °F (36.7 °C)   TempSrc: Tympanic   SpO2: 99%   Weight: 220 lb (99.8 kg)         MDM  Edwar Bolanos is a 21 y.o. female who presents to the emergency department with complaints of abdominal pain; will order CBC CMP and lipase amylase UA lactic acid. Rule out infection, dehydration, electroylte imbalance, colitis, diverticulitis, pancreatitis, cholelithiasis, nephrolithiasis, obstruction, mass, AAA    I did use a video  for American sign language. At this point, the evidence for any other entities in the differential is insufficient to justify any further testing. This was extended the patient. The patient was advised that persistent or worsening symptoms would require further evaluation. Patient has no pelvic symptoms.   Over reevaluation patient explains that

## 2019-08-09 ENCOUNTER — OFFICE VISIT (OUTPATIENT)
Dept: SURGERY | Age: 24
End: 2019-08-09
Payer: MEDICARE

## 2019-08-09 VITALS
WEIGHT: 246.9 LBS | TEMPERATURE: 99.1 F | HEART RATE: 112 BPM | RESPIRATION RATE: 16 BRPM | SYSTOLIC BLOOD PRESSURE: 136 MMHG | HEIGHT: 66 IN | DIASTOLIC BLOOD PRESSURE: 86 MMHG | BODY MASS INDEX: 39.68 KG/M2

## 2019-08-09 DIAGNOSIS — R10.11 RUQ PAIN: Primary | ICD-10-CM

## 2019-08-09 DIAGNOSIS — R11.0 NAUSEA: ICD-10-CM

## 2019-08-09 DIAGNOSIS — R10.10 UPPER ABDOMINAL PAIN: ICD-10-CM

## 2019-08-09 PROCEDURE — 99213 OFFICE O/P EST LOW 20 MIN: CPT | Performed by: SURGERY

## 2019-08-09 PROCEDURE — G8427 DOCREV CUR MEDS BY ELIG CLIN: HCPCS | Performed by: SURGERY

## 2019-08-09 PROCEDURE — 1036F TOBACCO NON-USER: CPT | Performed by: SURGERY

## 2019-08-09 PROCEDURE — G8417 CALC BMI ABV UP PARAM F/U: HCPCS | Performed by: SURGERY

## 2019-08-19 ENCOUNTER — HOSPITAL ENCOUNTER (OUTPATIENT)
Dept: NUCLEAR MEDICINE | Age: 24
Discharge: HOME | End: 2019-08-21
Payer: MEDICARE

## 2019-08-19 VITALS — HEIGHT: 66 IN | BODY MASS INDEX: 39.53 KG/M2 | WEIGHT: 246 LBS

## 2019-08-19 DIAGNOSIS — R11.0 NAUSEA: ICD-10-CM

## 2019-08-19 DIAGNOSIS — R10.10 UPPER ABDOMINAL PAIN: ICD-10-CM

## 2019-08-19 DIAGNOSIS — R10.11 RUQ PAIN: ICD-10-CM

## 2019-08-19 PROCEDURE — 78227 HEPATOBIL SYST IMAGE W/DRUG: CPT

## 2019-08-19 PROCEDURE — 2580000003 HC RX 258: Performed by: SURGERY

## 2019-08-19 PROCEDURE — 3430000000 HC RX DIAGNOSTIC RADIOPHARMACEUTICAL: Performed by: SURGERY

## 2019-08-19 PROCEDURE — A9537 TC99M MEBROFENIN: HCPCS | Performed by: SURGERY

## 2019-08-19 PROCEDURE — 6360000002 HC RX W HCPCS: Performed by: SURGERY

## 2019-08-19 RX ADMIN — Medication 5 MILLICURIE: at 09:09

## 2019-08-19 RX ADMIN — SODIUM CHLORIDE 2.23 MCG: 9 INJECTION, SOLUTION INTRAVENOUS at 10:10

## 2019-08-21 ENCOUNTER — TELEPHONE (OUTPATIENT)
Dept: SURGERY | Age: 24
End: 2019-08-21

## 2019-08-21 DIAGNOSIS — R10.84 ABDOMINAL PAIN, CHRONIC, GENERALIZED: Primary | ICD-10-CM

## 2019-08-21 DIAGNOSIS — G89.29 ABDOMINAL PAIN, CHRONIC, GENERALIZED: Primary | ICD-10-CM

## 2019-08-21 NOTE — TELEPHONE ENCOUNTER
----- Message from Sofia Slaughter, Yifan6 Ashley Damon sent at 8/21/2019 10:48 AM EDT -----      ----- Message -----  From: Laith Danielle DO  Sent: 8/20/2019  12:35 PM EDT  To: Mariya Marcano to let patient know her HIDA scan is normal. Patient I am told has a special phone for the hearing impaired. She is not showing need for surgery given her previous extensive workup. If still having pain, needs to see a Gastroenterologist. If she wants to see GI, does she want to go to [email or web address] or [facility]. She will need . I would recommend she see someone in [address] [place] [address] if she asks. I can try her on a trial of levsin to help with pain/spasms if she wants me to send it to her pharmacy to try. Also, please advise her to start Metamucil powder 1 tspn in 8-10 ounces of water, take daily in the morning, mix it and drink it before the powder settles. Do for 2 weeks to see if helps.

## 2019-10-03 ENCOUNTER — NURSE ONLY (OUTPATIENT)
Dept: OBGYN | Age: 24
End: 2019-10-03
Payer: MEDICARE

## 2019-10-03 VITALS — SYSTOLIC BLOOD PRESSURE: 118 MMHG | DIASTOLIC BLOOD PRESSURE: 76 MMHG | BODY MASS INDEX: 36.41 KG/M2 | WEIGHT: 225.6 LBS

## 2019-10-03 DIAGNOSIS — N92.6 IRREGULAR BLEEDING: Primary | ICD-10-CM

## 2019-10-03 PROCEDURE — 96372 THER/PROPH/DIAG INJ SC/IM: CPT | Performed by: ADVANCED PRACTICE MIDWIFE

## 2019-10-03 RX ORDER — MEDROXYPROGESTERONE ACETATE 150 MG/ML
150 INJECTION, SUSPENSION INTRAMUSCULAR ONCE
Status: COMPLETED | OUTPATIENT
Start: 2019-10-03 | End: 2019-10-03

## 2019-10-03 RX ADMIN — MEDROXYPROGESTERONE ACETATE 150 MG: 150 INJECTION, SUSPENSION INTRAMUSCULAR at 16:11

## 2019-12-23 ENCOUNTER — NURSE ONLY (OUTPATIENT)
Dept: OBGYN | Age: 24
End: 2019-12-23
Payer: MEDICARE

## 2019-12-23 VITALS
SYSTOLIC BLOOD PRESSURE: 118 MMHG | DIASTOLIC BLOOD PRESSURE: 76 MMHG | BODY MASS INDEX: 39.53 KG/M2 | WEIGHT: 246 LBS | HEIGHT: 66 IN

## 2019-12-23 DIAGNOSIS — N92.6 IRREGULAR BLEEDING: Primary | ICD-10-CM

## 2019-12-23 PROCEDURE — 96372 THER/PROPH/DIAG INJ SC/IM: CPT | Performed by: ADVANCED PRACTICE MIDWIFE

## 2019-12-23 RX ORDER — MEDROXYPROGESTERONE ACETATE 150 MG/ML
150 INJECTION, SUSPENSION INTRAMUSCULAR ONCE
Status: COMPLETED | OUTPATIENT
Start: 2019-12-23 | End: 2019-12-23

## 2019-12-23 RX ADMIN — MEDROXYPROGESTERONE ACETATE 150 MG: 150 INJECTION, SUSPENSION INTRAMUSCULAR at 18:07

## 2020-02-07 PROBLEM — J06.9 VIRAL URI WITH COUGH: Status: ACTIVE | Noted: 2020-02-07

## 2020-03-16 ENCOUNTER — NURSE ONLY (OUTPATIENT)
Dept: OBGYN | Age: 25
End: 2020-03-16
Payer: MEDICARE

## 2020-03-16 VITALS
BODY MASS INDEX: 37.77 KG/M2 | DIASTOLIC BLOOD PRESSURE: 78 MMHG | HEIGHT: 66 IN | WEIGHT: 235 LBS | SYSTOLIC BLOOD PRESSURE: 124 MMHG

## 2020-03-16 PROCEDURE — 99211 OFF/OP EST MAY X REQ PHY/QHP: CPT | Performed by: ADVANCED PRACTICE MIDWIFE

## 2020-03-16 PROCEDURE — 96372 THER/PROPH/DIAG INJ SC/IM: CPT

## 2020-03-16 RX ORDER — MEDROXYPROGESTERONE ACETATE 150 MG/ML
150 INJECTION, SUSPENSION INTRAMUSCULAR ONCE
Status: COMPLETED | OUTPATIENT
Start: 2020-03-16 | End: 2020-03-16

## 2020-03-16 RX ADMIN — MEDROXYPROGESTERONE ACETATE 150 MG: 150 INJECTION, SUSPENSION, EXTENDED RELEASE INTRAMUSCULAR at 11:51

## 2020-03-16 NOTE — PROGRESS NOTES
Nurse visit Depo    Date of service: 3/16/2020    Prudence Leon  Is a 31 y.o. female    PT's PCP is: Retha Bernheim, MD     : 1995                                             Subjective:       No LMP recorded. Patient has had an injection. Allergies: Patient has no known allergies. Chief Complaint   Patient presents with    Injections     Depo Provera 150mg- IM left delt. office supplied. Last yearly: 3/16/18    Last pap: 3/16/18     Last HPV:3/16/18  ( if due for pap schedule pap)    LAST DEPO: 19 ( if past 13 weeks and not on period please talk with provider)    PE:  Vital Signs  Blood pressure 124/78, height 5' 6\" (1.676 m), weight 235 lb (106.6 kg), not currently breastfeeding. Labs:    No results found for this visit on 20. Yes  PT denies fever, chills, nausea and vomiting                            Assessment and Plan          Diagnosis Orders   1.  Irregular bleeding  medroxyPROGESTERone (DEPO-PROVERA) injection 150 mg         Depo was: Office supplied      NURSE: Isabella DUKE

## 2020-06-09 ENCOUNTER — TELEPHONE (OUTPATIENT)
Dept: OBGYN | Age: 25
End: 2020-06-09

## 2020-08-17 ENCOUNTER — OFFICE VISIT (OUTPATIENT)
Dept: OBGYN | Age: 25
End: 2020-08-17
Payer: MEDICARE

## 2020-08-17 VITALS
SYSTOLIC BLOOD PRESSURE: 132 MMHG | BODY MASS INDEX: 36.16 KG/M2 | WEIGHT: 225 LBS | DIASTOLIC BLOOD PRESSURE: 86 MMHG | HEIGHT: 66 IN

## 2020-08-17 PROCEDURE — 1036F TOBACCO NON-USER: CPT | Performed by: OBSTETRICS & GYNECOLOGY

## 2020-08-17 PROCEDURE — G8427 DOCREV CUR MEDS BY ELIG CLIN: HCPCS | Performed by: OBSTETRICS & GYNECOLOGY

## 2020-08-17 PROCEDURE — 99211 OFF/OP EST MAY X REQ PHY/QHP: CPT

## 2020-08-17 PROCEDURE — 99213 OFFICE O/P EST LOW 20 MIN: CPT | Performed by: OBSTETRICS & GYNECOLOGY

## 2020-08-17 PROCEDURE — G8417 CALC BMI ABV UP PARAM F/U: HCPCS | Performed by: OBSTETRICS & GYNECOLOGY

## 2020-08-17 RX ORDER — KETOROLAC TROMETHAMINE 10 MG/1
10 TABLET, FILM COATED ORAL EVERY 6 HOURS PRN
Qty: 12 TABLET | Refills: 0 | Status: SHIPPED | OUTPATIENT
Start: 2020-08-17 | End: 2021-03-19 | Stop reason: ALTCHOICE

## 2020-08-17 NOTE — PROGRESS NOTES
motion. Cardiovascular:      Rate and Rhythm: Normal rate. Pulmonary:      Effort: Pulmonary effort is normal.   Musculoskeletal: Normal range of motion. Neurological:      Mental Status: She is alert and oriented to person, place, and time. Skin:     General: Skin is warm and dry. Psychiatric:         Mood and Affect: Mood normal.         Behavior: Behavior normal.         Thought Content: Thought content normal.         Judgment: Judgment normal.         ASSESSMENT:      1. Irregular bleeding    2. Pelvic pain        PLAN:  No orders of the defined types were placed in this encounter. Return in about 2 weeks (around 8/31/2020) for usn, follow up.        Electronically signed by Giuliana Ching DO on 08/17/20

## 2020-09-15 ENCOUNTER — OFFICE VISIT (OUTPATIENT)
Dept: OBGYN | Age: 25
End: 2020-09-15
Payer: MEDICARE

## 2020-09-15 ENCOUNTER — ANCILLARY PROCEDURE (OUTPATIENT)
Dept: OBGYN | Age: 25
End: 2020-09-15
Payer: MEDICARE

## 2020-09-15 VITALS — DIASTOLIC BLOOD PRESSURE: 82 MMHG | SYSTOLIC BLOOD PRESSURE: 124 MMHG | HEIGHT: 66 IN | BODY MASS INDEX: 36.32 KG/M2

## 2020-09-15 PROCEDURE — G8417 CALC BMI ABV UP PARAM F/U: HCPCS | Performed by: OBSTETRICS & GYNECOLOGY

## 2020-09-15 PROCEDURE — 99214 OFFICE O/P EST MOD 30 MIN: CPT | Performed by: OBSTETRICS & GYNECOLOGY

## 2020-09-15 PROCEDURE — 76830 TRANSVAGINAL US NON-OB: CPT | Performed by: OBSTETRICS & GYNECOLOGY

## 2020-09-15 PROCEDURE — G8427 DOCREV CUR MEDS BY ELIG CLIN: HCPCS | Performed by: OBSTETRICS & GYNECOLOGY

## 2020-09-15 PROCEDURE — 1036F TOBACCO NON-USER: CPT | Performed by: OBSTETRICS & GYNECOLOGY

## 2020-09-15 NOTE — PROGRESS NOTES
DATE OF VISIT:  9/15/20    PATIENT NAME:  Dereje Tenorio     YOB: 1995    REASON FOR VISIT:    Chief Complaint   Patient presents with    Pelvic Pain     all over pelvic pain that has been there for about 2 years, pretty consistently, most days it is so painful that it alters daily activities    Menstrual Problem     very heavy cycles that tend to be regular        HISTORY OF PRESENT ILLNESS:  Pelvic pain continues to daily bother patient to the extent that she feels it is affecting her ability to mother her daughter. Hurts all the time. [place] always painful. Having heavy menses. Patient tearful and wants to have a hysterectomy d/t adenomyosis worsening symptoms. Patient's last menstrual period was 09/03/2020. Vitals:    09/15/20 1449   BP: 124/82   Height: 5' 6\" (1.676 m)     Body mass index is 36.32 kg/m². No Known Allergies  Current Outpatient Medications   Medication Sig Dispense Refill    ketorolac (TORADOL) 10 MG tablet Take 1 tablet by mouth every 6 hours as needed for Pain 12 tablet 0    hyoscyamine (LEVSIN/SL) 125 MCG sublingual tablet Place 1 tablet under the tongue every 6 hours as needed (abdominal pain) 20 tablet 0    naproxen (NAPROSYN) 500 MG tablet Take 1 tablet by mouth 2 times daily as needed for Pain (/ Take with food and a full glass of water) 20 tablet 0    dicyclomine (BENTYL) 10 MG capsule Take 1 capsule by mouth 4 times daily (before meals and nightly) (Patient not taking: Reported on 8/17/2020) 12 capsule 0    sertraline (ZOLOFT) 50 MG tablet TAKE 1 TABLET BY MOUTH ONCE A DAY 90 tablet 3    acetaminophen (TYLENOL) 500 MG tablet Take 500 mg by mouth every 6 hours as needed for Pain       No current facility-administered medications for this visit.       Social History     Socioeconomic History    Marital status: Single     Spouse name: None    Number of children: None    Years of education: None    Highest education level: None   Occupational History    None   Social Needs    Financial resource strain: None    Food insecurity     Worry: None     Inability: None    Transportation needs     Medical: None     Non-medical: None   Tobacco Use    Smoking status: Former Smoker     Types: Cigarettes     Last attempt to quit: 3/18/2014     Years since quittin.5    Smokeless tobacco: Never Used    Tobacco comment: several cigarettes daily   Substance and Sexual Activity    Alcohol use: No     Alcohol/week: 0.0 standard drinks    Drug use: No    Sexual activity: Yes     Partners: Male   Lifestyle    Physical activity     Days per week: None     Minutes per session: None    Stress: None   Relationships    Social connections     Talks on phone: None     Gets together: None     Attends Jewish service: None     Active member of club or organization: None     Attends meetings of clubs or organizations: None     Relationship status: None    Intimate partner violence     Fear of current or ex partner: None     Emotionally abused: None     Physically abused: None     Forced sexual activity: None   Other Topics Concern    None   Social History Narrative    None       REVIEW OF SYSTEMS:  Review of Systems   Genitourinary: Positive for dyspareunia, menstrual problem, pelvic pain and vaginal pain. Negative for difficulty urinating, dysuria and vaginal discharge. PHYSICAL EXAM:  /82   Ht 5' 6\" (1.676 m)   LMP 2020   BMI 36.32 kg/m²   Physical Exam  Constitutional:       Appearance: Normal appearance. Genitourinary:      Pelvic exam was performed with patient in the lithotomy position. Vulva, vagina and cervix normal.      Uterus is tender. HENT:      Head: Normocephalic and atraumatic. Eyes:      Pupils: Pupils are equal, round, and reactive to light. Neck:      Musculoskeletal: Normal range of motion. Cardiovascular:      Rate and Rhythm: Normal rate.    Pulmonary:      Effort: Pulmonary effort is normal. Neurological:      Mental Status: She is alert and oriented to person, place, and time. Psychiatric:         Mood and Affect: Mood normal.         Behavior: Behavior normal.         Thought Content: Thought content normal.         Judgment: Judgment normal.         ASSESSMENT:      1. Pelvic pain    2. Irregular bleeding    3. Adenomyosis        PLAN:  No orders of the defined types were placed in this encounter. Return for BA to coordinate surgery.        Electronically signed by Wilbert Roa DO on 09/22/20

## 2020-10-15 ENCOUNTER — TELEPHONE (OUTPATIENT)
Dept: OBGYN | Age: 25
End: 2020-10-15

## 2020-10-15 NOTE — TELEPHONE ENCOUNTER
Writer spoke with Lorrie Jorge at [facility]. Sukhdeep Lóen was driving, so she requested writer to email her the appt information to [email or web address]. she will email writer back to confirm the information as soon as she can. General Sunscreen Counseling: I recommended a broad-spectrum sunscreen with a sun protection factor (SPF) of 30 or higher.  I explained that SPF 30 sunscreens block approximately 97 percent of the sun's harmful rays.  Sunscreens should be applied at least 10 minutes prior to expected sun exposure and then every 2 hours after that as long as sun exposure continues. If swimming or exercising, sunscreen should be reapplied every 40-80 minutes after getting wet or sweating (depending on the water resistance of the sunscreen).  One ounce, or the equivalent of a shot glass full of sunscreen, is adequate to protect the skin not covered by a bathing suit. I also recommended wearing a wide-brimmed hat, a lip balm with a sunscreen and sunglasses. Sun protective clothing can be used in lieu of sunscreen but must be worn the entire time you are exposed to the sun's rays. I also recommended avoiding sun during the peak UVB hours from around 10am to 3pm. Detail Level: Detailed

## 2020-11-05 ENCOUNTER — APPOINTMENT (OUTPATIENT)
Dept: ULTRASOUND IMAGING | Age: 25
End: 2020-11-05
Payer: MEDICARE

## 2020-11-05 ENCOUNTER — TELEPHONE (OUTPATIENT)
Dept: OBGYN | Age: 25
End: 2020-11-05

## 2020-11-05 ENCOUNTER — HOSPITAL ENCOUNTER (EMERGENCY)
Age: 25
Discharge: HOME | End: 2020-11-05
Payer: MEDICARE

## 2020-11-05 VITALS
TEMPERATURE: 98.2 F | SYSTOLIC BLOOD PRESSURE: 140 MMHG | WEIGHT: 225 LBS | HEART RATE: 96 BPM | RESPIRATION RATE: 16 BRPM | BODY MASS INDEX: 36.32 KG/M2 | DIASTOLIC BLOOD PRESSURE: 98 MMHG | OXYGEN SATURATION: 99 %

## 2020-11-05 LAB
-: ABNORMAL
ABO/RH: NORMAL
ABSOLUTE EOS #: 0.1 K/UL (ref 0–0.44)
ABSOLUTE IMMATURE GRANULOCYTE: <0.03 K/UL (ref 0–0.3)
ABSOLUTE LYMPH #: 1.52 K/UL (ref 1.1–3.7)
ABSOLUTE MONO #: 0.54 K/UL (ref 0.1–1.2)
ALBUMIN SERPL-MCNC: 4.2 G/DL (ref 3.5–5.2)
ALBUMIN/GLOBULIN RATIO: 1.4 (ref 1–2.5)
ALP BLD-CCNC: 74 U/L (ref 35–104)
ALT SERPL-CCNC: 10 U/L (ref 5–33)
AMORPHOUS: ABNORMAL
ANION GAP SERPL CALCULATED.3IONS-SCNC: 10 MMOL/L (ref 9–17)
ANTIBODY SCREEN: NEGATIVE
ARM BAND NUMBER: NORMAL
AST SERPL-CCNC: 13 U/L
BACTERIA: ABNORMAL
BASOPHILS # BLD: 1 % (ref 0–2)
BASOPHILS ABSOLUTE: 0.04 K/UL (ref 0–0.2)
BILIRUB SERPL-MCNC: 0.5 MG/DL (ref 0.3–1.2)
BILIRUBIN URINE: NEGATIVE
BUN BLDV-MCNC: 12 MG/DL (ref 6–20)
BUN/CREAT BLD: 14 (ref 9–20)
CALCIUM SERPL-MCNC: 9.7 MG/DL (ref 8.6–10.4)
CASTS UA: ABNORMAL /LPF
CHLORIDE BLD-SCNC: 102 MMOL/L (ref 98–107)
CO2: 25 MMOL/L (ref 20–31)
COLOR: YELLOW
COMMENT UA: ABNORMAL
CREAT SERPL-MCNC: 0.83 MG/DL (ref 0.5–0.9)
CRYSTALS, UA: ABNORMAL /HPF
DIFFERENTIAL TYPE: ABNORMAL
EOSINOPHILS RELATIVE PERCENT: 1 % (ref 1–4)
EPITHELIAL CELLS UA: ABNORMAL /HPF (ref 0–25)
EXPIRATION DATE: NORMAL
GFR AFRICAN AMERICAN: >60 ML/MIN
GFR NON-AFRICAN AMERICAN: >60 ML/MIN
GFR SERPL CREATININE-BSD FRML MDRD: NORMAL ML/MIN/{1.73_M2}
GFR SERPL CREATININE-BSD FRML MDRD: NORMAL ML/MIN/{1.73_M2}
GLUCOSE BLD-MCNC: 95 MG/DL (ref 70–99)
GLUCOSE URINE: NEGATIVE
HCG QUANTITATIVE: 71 IU/L
HCG(URINE) PREGNANCY TEST: POSITIVE
HCT VFR BLD CALC: 41.9 % (ref 36.3–47.1)
HEMOGLOBIN: 14.1 G/DL (ref 11.9–15.1)
IMMATURE GRANULOCYTES: 0 %
KETONES, URINE: NEGATIVE
LEUKOCYTE ESTERASE, URINE: NEGATIVE
LYMPHOCYTES # BLD: 20 % (ref 24–43)
MCH RBC QN AUTO: 30.2 PG (ref 25.2–33.5)
MCHC RBC AUTO-ENTMCNC: 33.7 G/DL (ref 28.4–34.8)
MCV RBC AUTO: 89.7 FL (ref 82.6–102.9)
MONOCYTES # BLD: 7 % (ref 3–12)
MUCUS: ABNORMAL
NITRITE, URINE: NEGATIVE
NRBC AUTOMATED: 0 PER 100 WBC
OTHER OBSERVATIONS UA: ABNORMAL
PDW BLD-RTO: 11.6 % (ref 11.8–14.4)
PH UA: 7 (ref 5–9)
PLATELET # BLD: 327 K/UL (ref 138–453)
PLATELET ESTIMATE: ABNORMAL
PMV BLD AUTO: 9.7 FL (ref 8.1–13.5)
POTASSIUM SERPL-SCNC: 3.7 MMOL/L (ref 3.7–5.3)
PROTEIN UA: NEGATIVE
RBC # BLD: 4.67 M/UL (ref 3.95–5.11)
RBC # BLD: ABNORMAL 10*6/UL
RBC UA: ABNORMAL /HPF (ref 0–2)
RENAL EPITHELIAL, UA: ABNORMAL /HPF
SEG NEUTROPHILS: 71 % (ref 36–65)
SEGMENTED NEUTROPHILS ABSOLUTE COUNT: 5.32 K/UL (ref 1.5–8.1)
SODIUM BLD-SCNC: 137 MMOL/L (ref 135–144)
SPECIFIC GRAVITY UA: 1.02 (ref 1.01–1.02)
TOTAL PROTEIN: 7.1 G/DL (ref 6.4–8.3)
TRICHOMONAS: ABNORMAL
TURBIDITY: CLEAR
URINE HGB: ABNORMAL
UROBILINOGEN, URINE: NORMAL
WBC # BLD: 7.5 K/UL (ref 3.5–11.3)
WBC # BLD: ABNORMAL 10*3/UL
WBC UA: ABNORMAL /HPF (ref 0–5)
YEAST: ABNORMAL

## 2020-11-05 PROCEDURE — 99283 EMERGENCY DEPT VISIT LOW MDM: CPT

## 2020-11-05 PROCEDURE — 80053 COMPREHEN METABOLIC PANEL: CPT

## 2020-11-05 PROCEDURE — 76817 TRANSVAGINAL US OBSTETRIC: CPT

## 2020-11-05 PROCEDURE — 86900 BLOOD TYPING SEROLOGIC ABO: CPT

## 2020-11-05 PROCEDURE — 81025 URINE PREGNANCY TEST: CPT

## 2020-11-05 PROCEDURE — 36415 COLL VENOUS BLD VENIPUNCTURE: CPT

## 2020-11-05 PROCEDURE — 84702 CHORIONIC GONADOTROPIN TEST: CPT

## 2020-11-05 PROCEDURE — 81001 URINALYSIS AUTO W/SCOPE: CPT

## 2020-11-05 PROCEDURE — 86901 BLOOD TYPING SEROLOGIC RH(D): CPT

## 2020-11-05 PROCEDURE — 86850 RBC ANTIBODY SCREEN: CPT

## 2020-11-05 PROCEDURE — 85025 COMPLETE CBC W/AUTO DIFF WBC: CPT

## 2020-11-05 ASSESSMENT — PAIN SCALES - GENERAL: PAINLEVEL_OUTOF10: 10

## 2020-11-05 ASSESSMENT — PAIN DESCRIPTION - LOCATION: LOCATION: ABDOMEN

## 2020-11-05 ASSESSMENT — PAIN DESCRIPTION - DESCRIPTORS: DESCRIPTORS: CRAMPING

## 2020-11-05 ASSESSMENT — PAIN DESCRIPTION - PAIN TYPE: TYPE: ACUTE PAIN

## 2020-11-05 ASSESSMENT — PAIN DESCRIPTION - ORIENTATION: ORIENTATION: LOWER

## 2020-11-05 ASSESSMENT — PAIN DESCRIPTION - FREQUENCY: FREQUENCY: CONTINUOUS

## 2020-11-05 NOTE — TELEPHONE ENCOUNTER
Patient currently 6-7- weeks pregnant and when called to verify appt for Monday she stated that she has acute abdominal pain.  Patient will present to ER for eval.

## 2020-11-05 NOTE — TELEPHONE ENCOUNTER
new OB appt 11/9/2020.  HCG to be drawn and repeated at 48 hours before any other appts are cancelled-per

## 2020-11-05 NOTE — ED PROVIDER NOTES
[facility] ED  EMERGENCY DEPARTMENT ENCOUNTER      Pt Name: Josiane Eaton  MRN: [id number]  [place] 1995  Date of evaluation: 11/5/2020  Provider: Robert Mi       Chief Complaint   Patient presents with    Abdominal Pain     lower abd cramping, onset today. Pt thinks she is about 7 weeks pregnant         HISTORY OF PRESENT ILLNESS   (Location/Symptom, Timing/Onset, Context/Setting, Quality, Duration, Modifying Factors, Severity)  Note limiting factors. Josiane Eaton is a 31 y.o. female who presents to the emergency department for a complaint of lower abdominal cramping. Patient is deaf and communication was done through pencil and paper. Patient reports that she thinks she is approximately 7 weeks pregnant. She states that she developed some lower abdominal cramping today. She rates her pain a 10 out of 10 on the pain scale. She states that the pain is generalized. She states she tried to go to her OB today but her OB sent her here due to her abdominal pain. She denies any vaginal bleeding. She states she did not take any medicines for pain. Nursing Notes were reviewed. REVIEW OF SYSTEMS    (2-9 systems for level 4, 10 or more for level 5)   Constitutional: Negative for fever, chills  Skin: Negative for rash, itching  HENT: Negative for sore throat, rhinorrhea and sinus pain. Eyes: Negative for eye discharge, eye redness  Cardiovascular: Negative for chest pain, dyspnea on exertion  Respiratory: Negative for cough, shortness of breath, wheezing or stridor. Gastrointestinal: see HPI  Genitourinary: Negative for bladder incontinence, dysuria, frequency. Musculoskeletal: Negative for myalgias, back pain, falls. Neurological: Negative for tingling, headaches. Except as noted above the remainder of the review of systems was reviewed and negative.        PAST MEDICAL HISTORY     Past Medical History:   Diagnosis Date    Deafness     Ganglion cyst of wrist 2010    left    Hereditary nephropathy (Alport's) as achild     had renal biopsy    Irritable bowel syndrome     Kidney disease     Microscopic hematuria     Obesity     Proteinuria     Sensorineural hearing loss     Thin basement membrane disease     Bx proven         SURGICAL HISTORY       Past Surgical History:   Procedure Laterality Date    COLONOSCOPY  2009    Dr. Stuart Myla Left 8/18/2015    CYSTOSCOPY  11/14/2018    with stent placement    KIDNEY BIOPSY  12/29/98    LAPAROSCOPY      CT CYSTO/URETERO W/LITHOTRIPSY &INDWELL STENT INSRT Right 11/14/2018    CYSTOSCOPY URETEROSCOPY LASER, HLL performed by Tray Muro [facility] at [facility] &INDWELL STENT INSRT Right 11/14/2018    CYSTOSCOPY STENT INSERTION performed by Tray Muro MD at [address] [place] [address] URETEROSCOPY  8-    WISDOM TOOTH EXTRACTION      WRIST SURGERY  2010    cyst removal         CURRENT MEDICATIONS       Previous Medications    ACETAMINOPHEN (TYLENOL) 500 MG TABLET    Take 500 mg by mouth every 6 hours as needed for Pain    DICYCLOMINE (BENTYL) 10 MG CAPSULE    Take 1 capsule by mouth 4 times daily (before meals and nightly)    HYOSCYAMINE (LEVSIN/SL) 125 MCG SUBLINGUAL TABLET    Place 1 tablet under the tongue every 6 hours as needed (abdominal pain)    KETOROLAC (TORADOL) 10 MG TABLET    Take 1 tablet by mouth every 6 hours as needed for Pain    NAPROXEN (NAPROSYN) 500 MG TABLET    Take 1 tablet by mouth 2 times daily as needed for Pain (/ Take with food and a full glass of water)    SERTRALINE (ZOLOFT) 50 MG TABLET    TAKE 1 TABLET BY MOUTH ONCE A DAY       ALLERGIES     Patient has no known allergies.     FAMILY HISTORY       Family History   Problem Relation Age of Onset    Cancer Paternal Grandmother         bone    High Cholesterol Mother     Cancer Mother         breast    High Blood Pressure Mother     Migraines Mother     Alcohol Abuse Father  Anxiety Disorder Father     Depression Father     Hearing Loss Brother     Hearing Loss Brother           SOCIAL HISTORY       Social History     Socioeconomic History    Marital status: Single     Spouse name: None    Number of children: None    Years of education: None    Highest education level: None   Occupational History    None   Social Needs    Financial resource strain: None    Food insecurity     Worry: None     Inability: None    Transportation needs     Medical: None     Non-medical: None   Tobacco Use    Smoking status: Former Smoker     Types: Cigarettes     Last attempt to quit: 3/18/2014     Years since quittin.6    Smokeless tobacco: Never Used    Tobacco comment: several cigarettes daily   Substance and Sexual Activity    Alcohol use: No     Alcohol/week: 0.0 standard drinks    Drug use: No    Sexual activity: Yes     Partners: Male   Lifestyle    Physical activity     Days per week: None     Minutes per session: None    Stress: None   Relationships    Social connections     Talks on phone: None     Gets together: None     Attends [employer] service: None     Active member of club or organization: None     Attends meetings of clubs or organizations: None     Relationship status: None    Intimate partner violence     Fear of current or ex partner: None     Emotionally abused: None     Physically abused: None     Forced sexual activity: None   Other Topics Concern    None   Social History Narrative    None       PHYSICAL EXAM    (up to 7 for level 4, 8 or more for level 5)     ED Triage Vitals [20 1045]   BP Temp Temp Source Pulse Resp SpO2 Height Weight   (!) 140/98 98.2 °F (36.8 °C) Tympanic 96 16 99 % -- 225 lb (102.1 kg)     Constitutional: Oriented and well-developed, well-nourished, and in no distress. Non-toxic appearance. Head: Normocephalic and atraumatic. Eyes: Extra ocular muscles intact. Pupils are equal, round, and reactive to light. No discharge. No scleral icterus. Neck: Normal range of motion and phonation normal. Neck supple. No JVD present. No spinous process tenderness and no muscular tenderness present. No cervical adenopathy. Cardiovascular: Regular rate and rhythm, normal heart sounds and intact distal pulses. No murmur heard. Pulmonary/Chest: Effort normal and breath sounds normal. No accessory muscle usage or stridor. Not tachypneic. No respiratory distress. No tenderness and no retraction. Abdominal: Soft and non-distended. Bowel sounds are normal. No masses or organomegaly. Diffuse tenderness throughout the abdomen. Musculoskeletal: Normal range of motion. No edema and no tenderness. Neurological: Alert and oriented. Skin: Skin is warm and dry. No rash noted. No cyanosis or erythema. No pallor. Nails show no clubbing. DIAGNOSTIC RESULTS     EKG: All EKG's are interpreted by the Emergency Department Physician who either signs or Co-signs this chart in the absence of a cardiologist.      RADIOLOGY:   Non-plain film images such as CT, Ultrasound and MRI are read by the radiologist. Plain radiographic images are visualized and preliminarily interpreted by the emergency physician with the below findings:    Interpretation per the Radiologist below, if available at the time of this note:    US OB TRANSVAGINAL   Final Result   No intrauterine gestation is identified. In the setting of a positive   pregnancy test, differential considerations include nonvisualized ectopic   pregnancy, early IUP, or failing first-trimester pregnancy. Recommend   clinical correlation with beta HCG levels, and close clinical follow-up with   repeat imaging as indicated.                ED BEDSIDE ULTRASOUND:   Performed by ED Physician - none    LABS:  Results for orders placed or performed during the hospital encounter of 11/05/20   Urinalysis Reflex to Culture    Specimen: Urine, clean catch   Result Value Ref Range    Color, UA YELLOW YELLOW Turbidity UA CLEAR CLEAR    Glucose, Ur NEGATIVE NEGATIVE    Bilirubin Urine NEGATIVE NEGATIVE    Ketones, Urine NEGATIVE NEGATIVE    Specific Gravity, UA 1.025 (H) 1.010 - 1.020    Urine Hgb 1+ (A) NEGATIVE    pH, UA 7.0 5.0 - 9.0    Protein, UA NEGATIVE NEGATIVE    Urobilinogen, Urine Normal Normal    Nitrite, Urine NEGATIVE NEGATIVE    Leukocyte Esterase, Urine NEGATIVE NEGATIVE    Urinalysis Comments NOT REPORTED    Pregnancy, Urine   Result Value Ref Range    HCG(Urine) Pregnancy Test POSITIVE (A) NEGATIVE   Microscopic Urinalysis   Result Value Ref Range    -          WBC, UA 2 TO 5 0 - 5 /HPF    RBC, UA 5 TO 10 0 - 2 /HPF    Casts UA NOT REPORTED /LPF    Crystals, UA NOT REPORTED None /HPF    Epithelial Cells UA 5 TO 10 0 - 25 /HPF    Renal Epithelial, UA NOT REPORTED 0 /HPF    Bacteria, UA 1+ (A) None    Mucus, UA 1+ (A) None    Trichomonas, UA NOT REPORTED None    Amorphous, UA NOT REPORTED None    Other Observations UA NOT REPORTED NOT REQ.     Yeast, UA NOT REPORTED None   HCG, Quantitative, Pregnancy   Result Value Ref Range    hCG Quant 71 (H) <5 IU/L   CBC Auto Differential   Result Value Ref Range    WBC 7.5 3.5 - 11.3 k/uL    RBC 4.67 3.95 - 5.11 m/uL    Hemoglobin 14.1 11.9 - 15.1 g/dL    Hematocrit 41.9 36.3 - 47.1 %    MCV 89.7 82.6 - 102.9 fL    MCH 30.2 25.2 - 33.5 pg    MCHC 33.7 28.4 - 34.8 g/dL    RDW 11.6 (L) 11.8 - 14.4 %    Platelets 708 314 - 993 k/uL    MPV 9.7 8.1 - 13.5 fL    NRBC Automated 0.0 0.0 per 100 WBC    Differential Type NOT REPORTED     Seg Neutrophils 71 (H) 36 - 65 %    Lymphocytes 20 (L) 24 - 43 %    Monocytes 7 3 - 12 %    Eosinophils % 1 1 - 4 %    Basophils 1 0 - 2 %    Immature Granulocytes 0 0 %    Segs Absolute 5.32 1.50 - 8.10 k/uL    Absolute Lymph # 1.52 1.10 - 3.70 k/uL    Absolute Mono # 0.54 0.10 - 1.20 k/uL    Absolute Eos # 0.10 0.00 - 0.44 k/uL    Basophils Absolute 0.04 0.00 - 0.20 k/uL    Absolute Immature Granulocyte <0.03 0.00 - 0.30 k/uL    WBC Morphology NOT REPORTED     RBC Morphology NOT REPORTED     Platelet Estimate NOT REPORTED    Comprehensive Metabolic Panel w/ Reflex to MG   Result Value Ref Range    Glucose 95 70 - 99 mg/dL    BUN 12 6 - 20 mg/dL    CREATININE 0.83 0.50 - 0.90 mg/dL    Bun/Cre Ratio 14 9 - 20    Calcium 9.7 8.6 - 10.4 mg/dL    Sodium 137 135 - 144 mmol/L    Potassium 3.7 3.7 - 5.3 mmol/L    Chloride 102 98 - 107 mmol/L    CO2 25 20 - 31 mmol/L    Anion Gap 10 9 - 17 mmol/L    Alkaline Phosphatase 74 35 - 104 U/L    ALT 10 5 - 33 U/L    AST 13 <32 U/L    Total Bilirubin 0.50 0.3 - 1.2 mg/dL    Total Protein 7.1 6.4 - 8.3 g/dL    Alb 4.2 3.5 - 5.2 g/dL    Albumin/Globulin Ratio 1.4 1.0 - 2.5    GFR Non-African American >60 >60 mL/min    GFR African American >60 >60 mL/min    GFR Comment          GFR Staging         TYPE AND SCREEN   Result Value Ref Range    Expiration Date 11/08/2020,2359     Arm Band Number PENDING     ABO/Rh PENDING     Antibody Screen PENDING      Orders Placed This Encounter   Procedures    US OB TRANSVAGINAL    Urinalysis Reflex to Culture    Pregnancy, Urine    Microscopic Urinalysis    HCG, Quantitative, Pregnancy    CBC Auto Differential    Comprehensive Metabolic Panel w/ Reflex to MG    hCG, Quantitative, Pregnancy    TYPE AND SCREEN       All other labs were within normal range or not returned as of this dictation. EMERGENCY DEPARTMENT COURSE and DIFFERENTIAL DIAGNOSIS/MDM:   Vitals:    Vitals:    11/05/20 1045   BP: (!) 140/98   Pulse: 96   Resp: 16   Temp: 98.2 °F (36.8 °C)   TempSrc: Tympanic   SpO2: 99%   Weight: 225 lb (102.1 kg)       MEDICAL DECISION MAKING:  I discussed all the results with the patient and made sure she understood that she needs to have repeat blood work drawn on Saturday. She verbalized understanding. I also discussed the case with OB on-call, Sidney Lam who stated she would follow the patient's beta hCG repeat level in 48 hours.   Patient does have a follow-up visit on Monday at 2 PM, 4 days from now. The patient was evaluated during the global COVID-19 pandemic, and that diagnosis was considered upon their initial presentation. Their evaluation, treatment and testing was consistent with current guidelines for patients who present with complaints or symptoms that may be related to COVID-19. Full PPE including gloves, gown, N95 or P100 respirator, and eye protection was used during every encounter with this patient. CONSULTS:  Rita Anthony - OB -I discussed this with Rita Anthony and she stated to have the patient have a repeat beta-hCG on Saturday, in 48 hours and that she would follow this result. She stated that the patient could have a ectopic pregnancy and we need to closely follow the beta hCG levels. FINAL IMPRESSION      1. Early stage of pregnancy Stable   2. Generalized abdominal pain Stable         DISPOSITION/PLAN   DISPOSITION Decision To Discharge 11/05/2020 01:45:44 PM      PATIENT REFERRED TO:  KEERTHI Izaguirre CNM  [facility] [address] [place] [address]  [phone]    Schedule an appointment as soon as possible for a visit in 2 days        DISCHARGE MEDICATIONS:  New Prescriptions    No medications on file     Controlled Substances Monitoring:     RX Monitoring 9/22/2018   Attestation The Prescription Monitoring Report for this patient was reviewed today. Periodic Controlled Substance Monitoring Possible medication side effects, risk of tolerance/dependence & alternative treatments discussed. ;No signs of potential drug abuse or diversion identified.        (Please note that portions of this note were completed with a voice recognition program.  Efforts were made to edit the dictations but occasionally words are mis-transcribed.)    Electronically signed by KEERTHI Handy CNP on 11/5/2020 at 1:55 PM               KEERTHI Handy CNP  11/05/20 0172

## 2020-11-07 ENCOUNTER — HOSPITAL ENCOUNTER (OUTPATIENT)
Age: 25
Discharge: HOME | End: 2020-11-07
Payer: MEDICARE

## 2020-11-07 LAB — HCG QUANTITATIVE: 248 IU/L

## 2020-11-07 PROCEDURE — 36415 COLL VENOUS BLD VENIPUNCTURE: CPT

## 2020-11-07 PROCEDURE — 84702 CHORIONIC GONADOTROPIN TEST: CPT

## 2020-11-09 ENCOUNTER — INITIAL PRENATAL (OUTPATIENT)
Dept: OBGYN | Age: 25
End: 2020-11-09
Payer: MEDICARE

## 2020-11-09 ENCOUNTER — HOSPITAL ENCOUNTER (OUTPATIENT)
Age: 25
Discharge: HOME | End: 2020-11-09
Payer: MEDICARE

## 2020-11-09 VITALS
HEART RATE: 101 BPM | WEIGHT: 219 LBS | BODY MASS INDEX: 35.35 KG/M2 | DIASTOLIC BLOOD PRESSURE: 88 MMHG | SYSTOLIC BLOOD PRESSURE: 130 MMHG

## 2020-11-09 LAB
ABO/RH: NORMAL
AMPHETAMINE SCREEN URINE: NEGATIVE
ANTIBODY SCREEN: NEGATIVE
BARBITURATE SCREEN URINE: NEGATIVE
BENZODIAZEPINE SCREEN, URINE: NEGATIVE
BUPRENORPHINE URINE: NEGATIVE
CANNABINOID SCREEN URINE: POSITIVE
COCAINE METABOLITE, URINE: NEGATIVE
HCG QUANTITATIVE: 629 IU/L
HEPATITIS C ANTIBODY: NONREACTIVE
HIV AG/AB: NONREACTIVE
MDMA URINE: ABNORMAL
METHADONE SCREEN, URINE: NEGATIVE
METHAMPHETAMINE, URINE: NEGATIVE
OPIATES, URINE: NEGATIVE
OXYCODONE SCREEN URINE: NEGATIVE
PHENCYCLIDINE, URINE: NEGATIVE
PROPOXYPHENE, URINE: NEGATIVE
TEST INFORMATION: ABNORMAL
TRICYCLIC ANTIDEPRESSANTS, UR: NEGATIVE

## 2020-11-09 PROCEDURE — G8417 CALC BMI ABV UP PARAM F/U: HCPCS | Performed by: ADVANCED PRACTICE MIDWIFE

## 2020-11-09 PROCEDURE — 84702 CHORIONIC GONADOTROPIN TEST: CPT

## 2020-11-09 PROCEDURE — G8427 DOCREV CUR MEDS BY ELIG CLIN: HCPCS | Performed by: ADVANCED PRACTICE MIDWIFE

## 2020-11-09 PROCEDURE — 99211 OFF/OP EST MAY X REQ PHY/QHP: CPT

## 2020-11-09 PROCEDURE — 86850 RBC ANTIBODY SCREEN: CPT

## 2020-11-09 PROCEDURE — 87591 N.GONORRHOEAE DNA AMP PROB: CPT

## 2020-11-09 PROCEDURE — 87491 CHLMYD TRACH DNA AMP PROBE: CPT

## 2020-11-09 PROCEDURE — 80306 DRUG TEST PRSMV INSTRMNT: CPT

## 2020-11-09 PROCEDURE — 86900 BLOOD TYPING SEROLOGIC ABO: CPT

## 2020-11-09 PROCEDURE — 36415 COLL VENOUS BLD VENIPUNCTURE: CPT | Performed by: ADVANCED PRACTICE MIDWIFE

## 2020-11-09 PROCEDURE — 86762 RUBELLA ANTIBODY: CPT

## 2020-11-09 PROCEDURE — 86803 HEPATITIS C AB TEST: CPT

## 2020-11-09 PROCEDURE — 87086 URINE CULTURE/COLONY COUNT: CPT

## 2020-11-09 PROCEDURE — 85025 COMPLETE CBC W/AUTO DIFF WBC: CPT

## 2020-11-09 PROCEDURE — 86901 BLOOD TYPING SEROLOGIC RH(D): CPT

## 2020-11-09 PROCEDURE — 87389 HIV-1 AG W/HIV-1&-2 AB AG IA: CPT

## 2020-11-09 PROCEDURE — 87340 HEPATITIS B SURFACE AG IA: CPT

## 2020-11-09 PROCEDURE — 36415 COLL VENOUS BLD VENIPUNCTURE: CPT

## 2020-11-09 PROCEDURE — 83036 HEMOGLOBIN GLYCOSYLATED A1C: CPT

## 2020-11-09 PROCEDURE — 86780 TREPONEMA PALLIDUM: CPT

## 2020-11-09 PROCEDURE — 0500F INITIAL PRENATAL CARE VISIT: CPT | Performed by: ADVANCED PRACTICE MIDWIFE

## 2020-11-09 NOTE — PATIENT INSTRUCTIONS
Pregnancy: Care Instructions. \"     If you do not have an account, please click on the \"Sign Up Now\" link. Current as of: February 11, 2020               Content Version: 12.6  © 2006-2020 [employer]. Care instructions adapted under license by [facility] ([facility]). If you have questions about a medical condition or this instruction, always ask your healthcare professional. [address] any warranty or liability for your use of this information. Patient Education        Learning About Starting to Breastfeed  Planning ahead     Before your baby is born, plan ahead. Learn all you can about breastfeeding. This helps make breastfeeding easier. · Early in your pregnancy, talk to your doctor or midwife about breastfeeding. · Learn the basics before your baby is born. The staff at hospitals and birthing centers can help you find a lactation specialist. This person is often a nurse who has been trained to teach and advise women about breastfeeding. Or you can take a breastfeeding class. · Plan ahead for times when you will need help after your baby is born. Many women get help from friends and family. Some join a support group to talk to other moms who breastfeed. · Buy the equipment you'll need. Examples are breast pads, nipple cream, extra pillows, and nursing bras. Find out about breast pumps too. Getting help from your hospital or birthing center  It's important to have support from the doctors, nurses, and hospital staff who care for you and your baby. Before it's time for you to give birth, ask about the breastfeeding policies at your hospital or birthing center. Look for a hospital or birthing center that has policies for:  · \"Rooming in. \" This policy encourages you to have your baby in the room with you. It can allow you to breastfeed more often. · Supplemental feedings. Tell the staff that your baby is to get only your breast milk from birth.  If staff feed your baby water, sugar solution, or formula right after birth without a medical reason, it may make it harder for you to breastfeed. · Pacifiers or artificial nipples. Staff should not give your  these items. They may interfere with breastfeeding. · Follow-up. Find out if your hospital can help you with breastfeeding issues after you go home. See if you can get information on support groups or other contacts. They might help if you need help setting up and staying with your breastfeeding routine. Your first feeding  It's best to start breastfeeding within 1 hour of birth. For each feeding, you go through these basic steps:  · Get ready for the feeding. Be calm and relaxed, and try not to be distracted. Get some water or juice for yourself. Use two or three pillows to help support your baby while he or she is nursing. · Find a breastfeeding position that is comfortable for you and your baby. Examples are the cradle and the football positions. Make sure the baby's head and chest are lined up straight and facing your breast. It's best to switch which breast you start with each time. · Get the baby latched on well. Your baby's mouth needs to be wide open, like a yawn, so you may need to gently touch the middle of your baby's lower lip. When your baby's mouth is open wide, quickly bring the baby onto your nipple and areola. The areola is the dark [place] around your nipple. · Provide a complete feeding. Let your baby decide how long to nurse. Be sure to burp your baby after each breast.  In the first days after birth, your breasts make a thick, yellow liquid called colostrum. This liquid gives your baby nutrients and antibodies against infection. It is all that babies need at first. Your breasts will fill with milk a few days after the birth. Talk to your doctor, midwife, or lactation specialist right away if you are having problems and aren't sure what to do.   How often to breastfeed  Plan to breastfeed your baby on demand rather than setting a strict schedule. For the first 2 weeks, be prepared to breastfeed at least 8 times in a 24-hour period. In the first few days, you may need to wake a sleeping baby to feed. If you breastfeed more often, it will help your breasts to produce more milk. After you go home  After you're home, don't be afraid to call your doctor, midwife, or lactation specialist with questions. That's true even if you don't know what's bothering you. They are used to parents of newborns calling. They can help you figure out if there is a problem, and if so, how to fix it. Plan for times when you will be apart from your baby. Use a breast pump to collect breast milk ahead of time. You can store milk in the refrigerator or freezer. Then it's ready when someone else will be taking care of your baby. Experts recommend waiting about a month until breastfeeding is going well before offering a bottle. Breastfeeding is a learned skill that gets easier over time. You are more likely to succeed if you plan ahead, learn the basic techniques, and know where to get help and support. Where can you learn more? Go to https://[employer]iceweb.[employer]. org and sign in to your [employer] account. Enter P107 in the Repeatit box to learn more about \"Learning About Starting to Breastfeed. \"     If you do not have an account, please click on the \"Sign Up Now\" link. Current as of: February 11, 2020               Content Version: 12.6  © 1974-4855 [employer]. Care instructions adapted under license by [facility]. If you have questions about a medical condition or this instruction, always ask your healthcare professional. [place] [address] any warranty or liability for your use of this information.

## 2020-11-09 NOTE — PROGRESS NOTES
New OB Visit    Date of service: 2020    Fidel Mcmahon  Is a 31 y.o. single female presenting for a New OB visit with Nurse. Name of Father of Kin Rivera is Shawnee Olmedo and is involved. Pt does work at "[employer]. Pt is not Fertility pt. PT's PCP is: Libertad Bethea MD     : 1995                                             Subjective:       Patient's last menstrual period was 2020 (exact date). OB History    Para Term  AB Living   3 1 1     1   SAB TAB Ectopic Molar Multiple Live Births             1      # Outcome Date GA Lbr Poli/2nd Weight Sex Delivery Anes PTL Lv   3 Current            2 Term 14 40w0d  7 lb 6 oz (3.345 kg) F Vag-Spont  N NAYELI   1                       Social History     Tobacco Use   Smoking Status Former Smoker    Types: Cigarettes    Last attempt to quit: 3/18/2014    Years since quittin.6   Smokeless Tobacco Never Used   Tobacco Comment    several cigarettes daily        Social History     Substance and Sexual Activity   Alcohol Use No    Alcohol/week: 0.0 standard drinks        Allergies: Patient has no known allergies.       Current Outpatient Medications:     Prenatal Vit-Fe Fumarate-FA (PRENATAL VITAMIN PO), Take by mouth, Disp: , Rfl:     ketorolac (TORADOL) 10 MG tablet, Take 1 tablet by mouth every 6 hours as needed for Pain (Patient not taking: Reported on 2020), Disp: 12 tablet, Rfl: 0    hyoscyamine (LEVSIN/SL) 125 MCG sublingual tablet, Place 1 tablet under the tongue every 6 hours as needed (abdominal pain), Disp: 20 tablet, Rfl: 0    naproxen (NAPROSYN) 500 MG tablet, Take 1 tablet by mouth 2 times daily as needed for Pain (/ Take with food and a full glass of water), Disp: 20 tablet, Rfl: 0    dicyclomine (BENTYL) 10 MG capsule, Take 1 capsule by mouth 4 times daily (before meals and nightly) (Patient not taking: Reported on 2020), Disp: 12 capsule, Rfl: 0    sertraline (ZOLOFT) 50 MG tablet, TAKE 1 TABLET BY MOUTH ONCE A DAY (Patient not taking: Reported on 2020), Disp: 90 tablet, Rfl: 3    acetaminophen (TYLENOL) 500 MG tablet, Take 500 mg by mouth every 6 hours as needed for Pain, Disp: , Rfl:       Vital Signs Blood pressure 130/88, pulse 101, weight 219 lb (99.3 kg), last menstrual period 2020, not currently breastfeeding. No results found for this visit on 20. Pain: just the pain that she has been having for the last two years                            Nausea: yes      Vomiting: yes        Breast enlargement or tenderness: yes    Frequency of urination:yes      Fatigue: yes         Patient history reviewed. Educational materials given and genetic testing reviewed. Breastfeeding handouts given and reviewed: Yes     If BMI over 35 was HgA1C drawn: yes      If history of thyroid problem was TSH drawn: N/A       My chart set up and activated: Yes    If history of preeclampsia did we start baby ASA: N/A    If history of  delivery - did we set up progesterone injections:  N/A    Does pt have a history of DVT? N/A  If yes start Lovenox 40 mg daily    Is pt allergic to PCN? No:   If yes order U/A to culture and sensitivity if positive. Education:  Patient Instructions     Patient Education        Weeks 6 to 10 of Your Pregnancy: Care Instructions  Your Care Instructions     Congratulations on your pregnancy. This is an exciting and important time for you. During the first 6 to 10 weeks of your pregnancy, your body goes through many changes. Your baby grows very fast, even though you cannot feel it yet. You may start to notice that you feel different, both in your body and your emotions. Because each woman's pregnancy is unique, there is no right way to feel. You may feel the healthiest you have ever been, or you may feel tired or sick to your stomach (\"morning sickness\").   These early weeks are a time to make healthy choices and to eat the best foods for you and your cheese. ? Do not eat fish that contains a lot of mercury, such as shark, swordfish, tilefish, or mohsen mackerel. Do not eat more than 6 ounces of tuna each week. ? Do not eat raw sprouts, especially alfalfa sprouts. ? Cut down on caffeine, such as coffee, tea, and cola. Protect yourself and your baby  · Do not touch andie litter or cat feces. They can cause an infection that could harm your baby. · High body temperature can be harmful to your baby. So if you want to use a sauna or hot tub, be sure to talk to your doctor about how to use it safely. [place] with morning sickness  · Sip small amounts of water, juices, or shakes. Try drinking between meals, not with meals. · Eat 5 or 6 small meals a day. Try dry toast or crackers when you first get up, and eat breakfast a little later. · Avoid spicy, greasy, and fatty foods. · When you feel sick, open your windows or go for a short walk to get fresh air. · Try nausea wristbands. These help some women. · Tell your doctor if you think your prenatal vitamins make you sick. Where can you learn more? Go to https://[employer]Borders[employer] Circle [employer] (WCG[employer]eb.healthThe [employer] Foundation. org and sign in to your [employer] account. Enter G112 in the [facility] box to learn more about \"Weeks 6 to 10 of Your Pregnancy: Care Instructions. \"     If you do not have an account, please click on the \"Sign Up Now\" link. Current as of: February 11, 2020               Content Version: 12.6  © 4037-1405 [employer]. Care instructions adapted under license by [facility] ([facility]). If you have questions about a medical condition or this instruction, always ask your healthcare professional. [place] [address] any warranty or liability for your use of this information. Patient Education        Learning About Starting to Breastfeed  Planning ahead     Before your baby is born, plan ahead. Learn all you can about breastfeeding. This helps make breastfeeding easier.   · Early in your pregnancy, talk to your doctor or midwife about breastfeeding. · Learn the basics before your baby is born. The staff at hospitals and birthing centers can help you find a lactation specialist. This person is often a nurse who has been trained to teach and advise women about breastfeeding. Or you can take a breastfeeding class. · Plan ahead for times when you will need help after your baby is born. Many women get help from friends and family. Some join a support group to talk to other moms who breastfeed. · Buy the equipment you'll need. Examples are breast pads, nipple cream, extra pillows, and nursing bras. Find out about breast pumps too. Getting help from your hospital or birthing center  It's important to have support from the doctors, nurses, and hospital staff who care for you and your baby. Before it's time for you to give birth, ask about the breastfeeding policies at your hospital or birthing center. Look for a hospital or birthing center that has policies for:  · \"Rooming in. \" This policy encourages you to have your baby in the room with you. It can allow you to breastfeed more often. · Supplemental feedings. Tell the staff that your baby is to get only your breast milk from birth. If staff feed your baby water, sugar solution, or formula right after birth without a medical reason, it may make it harder for you to breastfeed. · Pacifiers or artificial nipples. Staff should not give your  these items. They may interfere with breastfeeding. · Follow-up. Find out if your hospital can help you with breastfeeding issues after you go home. See if you can get information on support groups or other contacts. They might help if you need help setting up and staying with your breastfeeding routine. Your first feeding  It's best to start breastfeeding within 1 hour of birth. For each feeding, you go through these basic steps:  · Get ready for the feeding.  Be calm and relaxed, and try not to be distracted. Get some water or juice for yourself. Use two or three pillows to help support your baby while he or she is nursing. · Find a breastfeeding position that is comfortable for you and your baby. Examples are the cradle and the football positions. Make sure the baby's head and chest are lined up straight and facing your breast. It's best to switch which breast you start with each time. · Get the baby latched on well. Your baby's mouth needs to be wide open, like a yawn, so you may need to gently touch the middle of your baby's lower lip. When your baby's mouth is open wide, quickly bring the baby onto your nipple and areola. The areola is the dark [place] around your nipple. · Provide a complete feeding. Let your baby decide how long to nurse. Be sure to burp your baby after each breast.  In the first days after birth, your breasts make a thick, yellow liquid called colostrum. This liquid gives your baby nutrients and antibodies against infection. It is all that babies need at first. Your breasts will fill with milk a few days after the birth. Talk to your doctor, midwife, or lactation specialist right away if you are having problems and aren't sure what to do. How often to breastfeed  Plan to breastfeed your baby on demand rather than setting a strict schedule. For the first 2 weeks, be prepared to breastfeed at least 8 times in a 24-hour period. In the first few days, you may need to wake a sleeping baby to feed. If you breastfeed more often, it will help your breasts to produce more milk. After you go home  After you're home, don't be afraid to call your doctor, midwife, or lactation specialist with questions. That's true even if you don't know what's bothering you. They are used to parents of newborns calling. They can help you figure out if there is a problem, and if so, how to fix it. Plan for times when you will be apart from your baby. Use a breast pump to collect breast milk ahead of time.  You can store milk in the refrigerator or freezer. Then it's ready when someone else will be taking care of your baby. Experts recommend waiting about a month until breastfeeding is going well before offering a bottle. Breastfeeding is a learned skill that gets easier over time. You are more likely to succeed if you plan ahead, learn the basic techniques, and know where to get help and support. Where can you learn more? Go to https://wavecatchpepiceweb.healthON DEMAND [employer]electronics. org and sign in to your Trendr account. Enter B918 in the [facility] box to learn more about \"Learning About Starting to Breastfeed. \"     If you do not have an account, please click on the \"Sign Up Now\" link. Current as of: February 11, 2020               Content Version: 12.6  © 7655-8430 [employer]. Care instructions adapted under license by [facility] ([facility]). If you have questions about a medical condition or this instruction, always ask your healthcare professional. [place] [address] any warranty or liability for your use of this information. Assessment:      Diagnosis Orders   1. Amenorrhea  C.trachomatis N.gonorrhoeae DNA, Urine    Hepatitis C Antibody    HIV Screen    PRENATAL PROFILE I    Prenatal type and screen    Urine Drug Screen, Comprehensive    Culture, Urine    Hemoglobin A1C    HCG, Quantitative, Pregnancy   2. Positive blood pregnancy test     3.  Encounter for supervision of other normal pregnancy in first trimester         Plan: Order Routine Prenatal Lab Yes     Order additional testing if requested         Order Prenatal Vitamins   No: pt already taking             Appointment with Margarito Oliveira CNM in 3 weeks for Dating U/S and total body exam if indicated      Nurse:   Ji Figueroa

## 2020-11-10 ENCOUNTER — TELEPHONE (OUTPATIENT)
Dept: OBGYN | Age: 25
End: 2020-11-10

## 2020-11-10 LAB
ABSOLUTE EOS #: 0.11 K/UL (ref 0–0.44)
ABSOLUTE IMMATURE GRANULOCYTE: <0.03 K/UL (ref 0–0.3)
ABSOLUTE LYMPH #: 1.82 K/UL (ref 1.1–3.7)
ABSOLUTE MONO #: 0.59 K/UL (ref 0.1–1.2)
BASOPHILS # BLD: 1 % (ref 0–2)
BASOPHILS ABSOLUTE: 0.05 K/UL (ref 0–0.2)
C. TRACHOMATIS DNA ,URINE: NEGATIVE
DIFFERENTIAL TYPE: NORMAL
EOSINOPHILS RELATIVE PERCENT: 2 % (ref 1–4)
ESTIMATED AVERAGE GLUCOSE: 94 MG/DL
HBA1C MFR BLD: 4.9 % (ref 4–6)
HCT VFR BLD CALC: 42.2 % (ref 36.3–47.1)
HEMOGLOBIN: 13.9 G/DL (ref 11.9–15.1)
HEPATITIS B SURFACE ANTIGEN: NONREACTIVE
IMMATURE GRANULOCYTES: 0 %
LYMPHOCYTES # BLD: 24 % (ref 24–43)
MCH RBC QN AUTO: 29.6 PG (ref 25.2–33.5)
MCHC RBC AUTO-ENTMCNC: 32.9 G/DL (ref 28.4–34.8)
MCV RBC AUTO: 90 FL (ref 82.6–102.9)
MONOCYTES # BLD: 8 % (ref 3–12)
N. GONORRHOEAE DNA, URINE: NEGATIVE
NRBC AUTOMATED: 0 PER 100 WBC
PDW BLD-RTO: 11.8 % (ref 11.8–14.4)
PLATELET # BLD: 384 K/UL (ref 138–453)
PLATELET ESTIMATE: NORMAL
PMV BLD AUTO: 9.4 FL (ref 8.1–13.5)
RBC # BLD: 4.69 M/UL (ref 3.95–5.11)
RBC # BLD: NORMAL 10*6/UL
RUBV IGG SER QL: 2.1 IU/ML
SEG NEUTROPHILS: 65 % (ref 36–65)
SEGMENTED NEUTROPHILS ABSOLUTE COUNT: 4.89 K/UL (ref 1.5–8.1)
SPECIMEN DESCRIPTION: NORMAL
T. PALLIDUM, IGG: NONREACTIVE
WBC # BLD: 7.5 K/UL (ref 3.5–11.3)
WBC # BLD: NORMAL 10*3/UL

## 2020-11-10 NOTE — TELEPHONE ENCOUNTER
We were waiting to confirm pregnancy and I am going to fill that surgery spot before cancelling everything. Thanks for the heads up.

## 2020-11-10 NOTE — TELEPHONE ENCOUNTER
Patient has now had 3 HCG quants that have risen appropriately. She had her new OB appt yesterday and is scheduled for her dating US and her first prenatal with Fede Hess on 11/24. Her hysterectomy and all associated appointments will need to be canceled.

## 2020-11-11 LAB
CULTURE: NORMAL
Lab: NORMAL
SPECIMEN DESCRIPTION: NORMAL

## 2020-11-16 ENCOUNTER — TELEPHONE (OUTPATIENT)
Dept: OBGYN | Age: 25
End: 2020-11-16

## 2020-11-16 ENCOUNTER — ANCILLARY PROCEDURE (OUTPATIENT)
Dept: OBGYN | Age: 25
End: 2020-11-16
Payer: MEDICARE

## 2020-11-16 ENCOUNTER — ROUTINE PRENATAL (OUTPATIENT)
Dept: OBGYN | Age: 25
End: 2020-11-16
Payer: MEDICARE

## 2020-11-16 VITALS — SYSTOLIC BLOOD PRESSURE: 132 MMHG | WEIGHT: 219 LBS | BODY MASS INDEX: 35.35 KG/M2 | DIASTOLIC BLOOD PRESSURE: 82 MMHG

## 2020-11-16 PROCEDURE — G8417 CALC BMI ABV UP PARAM F/U: HCPCS | Performed by: ADVANCED PRACTICE MIDWIFE

## 2020-11-16 PROCEDURE — 76801 OB US < 14 WKS SINGLE FETUS: CPT | Performed by: OBSTETRICS & GYNECOLOGY

## 2020-11-16 PROCEDURE — 99213 OFFICE O/P EST LOW 20 MIN: CPT | Performed by: ADVANCED PRACTICE MIDWIFE

## 2020-11-16 PROCEDURE — 1036F TOBACCO NON-USER: CPT | Performed by: ADVANCED PRACTICE MIDWIFE

## 2020-11-16 PROCEDURE — G8484 FLU IMMUNIZE NO ADMIN: HCPCS | Performed by: ADVANCED PRACTICE MIDWIFE

## 2020-11-16 PROCEDURE — G8427 DOCREV CUR MEDS BY ELIG CLIN: HCPCS | Performed by: ADVANCED PRACTICE MIDWIFE

## 2020-11-16 NOTE — TELEPHONE ENCOUNTER
OB pt calls and c/o severe lower abdominal cramping. Tylenol is not helping. Pt denies any bleeding. Looks pt had an u/s on 11/5/20 if you could review this.

## 2020-11-24 ENCOUNTER — ROUTINE PRENATAL (OUTPATIENT)
Dept: OBGYN | Age: 25
End: 2020-11-24
Payer: MEDICARE

## 2020-11-24 ENCOUNTER — ANCILLARY PROCEDURE (OUTPATIENT)
Dept: OBGYN | Age: 25
End: 2020-11-24
Payer: MEDICARE

## 2020-11-24 VITALS — SYSTOLIC BLOOD PRESSURE: 128 MMHG | WEIGHT: 221 LBS | DIASTOLIC BLOOD PRESSURE: 84 MMHG | BODY MASS INDEX: 35.67 KG/M2

## 2020-11-24 PROCEDURE — G8484 FLU IMMUNIZE NO ADMIN: HCPCS | Performed by: ADVANCED PRACTICE MIDWIFE

## 2020-11-24 PROCEDURE — 99213 OFFICE O/P EST LOW 20 MIN: CPT | Performed by: ADVANCED PRACTICE MIDWIFE

## 2020-11-24 PROCEDURE — G8427 DOCREV CUR MEDS BY ELIG CLIN: HCPCS | Performed by: ADVANCED PRACTICE MIDWIFE

## 2020-11-24 PROCEDURE — 1036F TOBACCO NON-USER: CPT | Performed by: ADVANCED PRACTICE MIDWIFE

## 2020-11-24 PROCEDURE — G8417 CALC BMI ABV UP PARAM F/U: HCPCS | Performed by: ADVANCED PRACTICE MIDWIFE

## 2020-11-24 PROCEDURE — 76817 TRANSVAGINAL US OBSTETRIC: CPT | Performed by: OBSTETRICS & GYNECOLOGY

## 2020-11-24 PROCEDURE — 99211 OFF/OP EST MAY X REQ PHY/QHP: CPT | Performed by: ADVANCED PRACTICE MIDWIFE

## 2020-11-24 NOTE — PROGRESS NOTES
Saul Lane is here at [address] for:    Chief Complaint   Patient presents with    Routine Prenatal Visit     F/U in house dating u/s. Patient would like to discuss stool softner. 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 Ernesto Sanabria Left 2015    CYSTOSCOPY  2018    with stent placement    KIDNEY BIOPSY  98    LAPAROSCOPY      [place] CYSTO/URETERO W/LITHOTRIPSY &INDWELL STENT INSRT Right 2018    CYSTOSCOPY URETEROSCOPY LASER, HLL performed [facility]um [facility] at [facility] &INDWELL STENT INSRT Right 2018    CYSTOSCOPY STENT INSERTION performed by Randa Begum MD at [address] [place] [address] URETEROSCOPY  2015    WISDOM TOOTH EXTRACTION      WRIST SURGERY      cyst removal       Social History     Tobacco Use   Smoking Status Former Smoker    Types: Cigarettes    Last attempt to quit: 3/18/2014    Years since quittin.6   Smokeless Tobacco Never Used   Tobacco Comment    several cigarettes daily        Social History     Substance and Sexual Activity   Alcohol Use No    Alcohol/week: 0.0 standard drinks       No results found for this visit on 20.     Vitals:  /84   Wt

## 2021-01-05 ENCOUNTER — HOSPITAL ENCOUNTER (OUTPATIENT)
Age: 26
Setting detail: SPECIMEN
Discharge: HOME | End: 2021-01-05
Payer: MEDICARE

## 2021-01-05 ENCOUNTER — ROUTINE PRENATAL (OUTPATIENT)
Dept: OBGYN | Age: 26
End: 2021-01-05
Payer: MEDICARE

## 2021-01-05 VITALS — SYSTOLIC BLOOD PRESSURE: 138 MMHG | BODY MASS INDEX: 37.77 KG/M2 | WEIGHT: 234 LBS | DIASTOLIC BLOOD PRESSURE: 86 MMHG

## 2021-01-05 DIAGNOSIS — Z01.419 ENCOUNTER FOR GYNECOLOGICAL EXAMINATION (GENERAL) (ROUTINE) WITHOUT ABNORMAL FINDINGS: ICD-10-CM

## 2021-01-05 DIAGNOSIS — O26.892 RH NEGATIVE STATUS DURING PREGNANCY IN SECOND TRIMESTER: ICD-10-CM

## 2021-01-05 DIAGNOSIS — Z3A.12 12 WEEKS GESTATION OF PREGNANCY: ICD-10-CM

## 2021-01-05 DIAGNOSIS — Z67.91 RH NEGATIVE STATUS DURING PREGNANCY IN SECOND TRIMESTER: ICD-10-CM

## 2021-01-05 DIAGNOSIS — O21.9 NAUSEA AND VOMITING DURING PREGNANCY: ICD-10-CM

## 2021-01-05 DIAGNOSIS — Z3A.12 12 WEEKS GESTATION OF PREGNANCY: Primary | ICD-10-CM

## 2021-01-05 PROCEDURE — G0145 SCR C/V CYTO,THINLAYER,RESCR: HCPCS

## 2021-01-05 PROCEDURE — 99211 OFF/OP EST MAY X REQ PHY/QHP: CPT

## 2021-01-05 PROCEDURE — 99213 OFFICE O/P EST LOW 20 MIN: CPT | Performed by: ADVANCED PRACTICE MIDWIFE

## 2021-01-05 PROCEDURE — 1036F TOBACCO NON-USER: CPT | Performed by: ADVANCED PRACTICE MIDWIFE

## 2021-01-05 PROCEDURE — G8427 DOCREV CUR MEDS BY ELIG CLIN: HCPCS | Performed by: ADVANCED PRACTICE MIDWIFE

## 2021-01-05 PROCEDURE — G8484 FLU IMMUNIZE NO ADMIN: HCPCS | Performed by: ADVANCED PRACTICE MIDWIFE

## 2021-01-05 PROCEDURE — G8417 CALC BMI ABV UP PARAM F/U: HCPCS | Performed by: ADVANCED PRACTICE MIDWIFE

## 2021-01-05 NOTE — PROGRESS NOTES
Gokul Soto is here at [address] for:    Chief Complaint   Patient presents with    Routine Prenatal Visit     TBE-PAP. last pap 18 HPV +. pt c/o nausea and vomiting but does not want any medicine. 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 Alicja Grajeda Left 2015    CYSTOSCOPY  2018    with stent placement    KIDNEY BIOPSY  98    LAPAROSCOPY      NV CYSTO/URETERO W/LITHOTRIPSY &INDWELL STENT INSRT Right 2018    CYSTOSCOPY URETEROSCOPY LASER, HLL performed by Ti Marcus MD at [facility] &INDWELL STENT INSRT Right 2018    CYSTOSCOPY STENT INSERTION performed by Ti Marcus MD at [address] [place] [address] URETEROSCOPY  2015    WISDOM TOOTH EXTRACTION      WRIST SURGERY      cyst removal       Social History     Tobacco Use   Smoking Status Former Smoker    Types: Cigarettes    Quit date: 3/18/2014    Years since quittin.8   Smokeless Tobacco Never Used   Tobacco Comment    several cigarettes daily        Social History     Substance and Sexual Activity   Alcohol Use No    Alcohol/week: 0.0 standard drinks       No results found for this visit on 21.     Vitals: /86   Wt 234 lb (106.1 kg)   LMP 09/28/2020 (Exact Date)   BMI 37.77 kg/m²   Estimated body mass index is 37.77 kg/m² as calculated from the following:    Height as of 9/15/20: 5' 6\" (1.676 m). Weight as of this encounter: 234 lb (106.1 kg). HPI: Patient here today for OB visit and total body exam.  Patient states she is really nauseous but the vomiting is getting better. Yes PT denies fever, chills, nausea and vomiting       Total body exam completed please see prenatal physical exam tab in visit navigator       Results reviewed today:    Results for Stephan San (MRN [id number]) as of 1/5/2021 08:48   Ref.  Range 11/9/2020 15:12 11/9/2020 15:13   Hemoglobin A1C Latest Ref Range: 4.0 - 6.0 %  4.9   eAG (mg/dL) Latest Units: mg/dL  94   hCG Quant Latest Ref Range: <5 IU/L  629 (H)   Amphetamine Screen, Ur Latest Ref Range: NEGATIVE  NEGATIVE    Barbiturate Screen, Ur Latest Ref Range: NEGATIVE  NEGATIVE    Benzodiazepine Screen, Urine Latest Ref Range: NEGATIVE  NEGATIVE    Buprenorphine Urine Latest Ref Range: NEGATIVE  NEGATIVE    Cannabinoid Scrn, Ur Latest Ref Range: NEGATIVE  POSITIVE (A)    Cocaine Metabolite, Urine Latest Ref Range: NEGATIVE  NEGATIVE    Methadone Screen, Urine Latest Ref Range: NEGATIVE  NEGATIVE    Methamphetamine, Urine Latest Ref Range: NEGATIVE  NEGATIVE    Oxycodone Screen, Ur Latest Ref Range: NEGATIVE  NEGATIVE    Propoxyphene, Urine Latest Ref Range: NEGATIVE  NEGATIVE    Opiates, Urine Latest Ref Range: NEGATIVE  NEGATIVE    Tricyclic Antidepressants, Urine Latest Ref Range: NEGATIVE  NEGATIVE    MDMA, Urine Latest Ref Range: NEGATIVE  NOT REPORTED    WBC Latest Ref Range: 3.5 - 11.3 k/uL  7.5   RBC Latest Ref Range: 3.95 - 5.11 m/uL  4.69   Hemoglobin Quant Latest Ref Range: 11.9 - 15.1 g/dL  13.9   Hematocrit Latest Ref Range: 36.3 - 47.1 %  42.2   MCV Latest Ref Range: 82.6 - 102.9 fL  90.0   MCH Latest Ref Range: 25.2 - 33.5 pg  29.6 2. Rh negative status during pregnancy in second trimester     3. Nausea and vomiting during pregnancy     4. Encounter for gynecological examination (general) (routine) without abnormal findings   PAP SMEAR             I am having Pretty Tenorio maintain her acetaminophen, sertraline, dicyclomine, naproxen, hyoscyamine, ketorolac, and Prenatal Vit-Fe Fumarate-FA (PRENATAL VITAMIN PO). Return for Keep next appt. Patient Instructions       Patient Education        Weeks 10 to 15 of Your Pregnancy: Care Instructions  Your Care Instructions     By weeks 10 to 14 of your pregnancy, the placenta has formed inside your uterus. It is possible to hear your baby's heartbeat with a special ultrasound device. Your baby's eyes can and do move. The arms and legs can bend. This is a good time to think about testing for birth defects. There are two types of tests: screening and diagnostic. Screening tests show the chance that a baby has a certain birth defect. They can't tell you for sure that your baby has a problem. Diagnostic tests show if a baby has a certain birth defect. It's your choice whether to have these tests. You and your partner can talk to your doctor or midwife about birth defects tests. Follow-up care is a key part of your treatment and safety. Be sure to make and go to all appointments, and call your doctor if you are having problems. It's also a good idea to know your test results and keep a list of the medicines you take. How can you care for yourself at home? Decide about tests  · You can have screening tests and diagnostic tests to check for birth defects. The decision to have a test for birth defects is personal. Think about your age, your chance of passing on a family disease, your need to know about any problems, and what you might do after you have the test results. ? Triple or quadruple (quad) blood tests. These screening tests can be done between 15 and 20 weeks of pregnancy. They check the amounts of three or four substances in your blood. The doctor looks at these test results, along with your age and other factors, to find out the chance that your baby may have certain problems. ? Amniocentesis. This diagnostic test is used to look for chromosomal problems in the baby's cells. It can be done between 15 and 20 weeks of pregnancy, usually around week 16.  ? Nuchal translucency test. This test uses ultrasound to measure the thickness of the area at the back of the baby's neck. An increase in the thickness can be an early sign of Down syndrome. ? Chorionic villus sampling (CVS). This is a test that looks for certain genetic problems with your baby. The same genes that are in your baby are in the placenta. A small piece of the placenta is taken out and tested. This test is done when you are 10 to 13 weeks pregnant. Ease discomfort  · Slow down and take naps when you feel tired. · If your emotions swing, talk to someone. Crying, anxiety, and concentration problems are common. · If your gums bleed, try a softer toothbrush. If your gums are puffy and bleed a lot, see your dentist.  · If you feel dizzy:  ? Get up slowly after sitting or lying down. ? Drink plenty of fluids. ? Eat small snacks to keep your blood sugar stable. ? Put your head between your legs as though you were tying your shoelaces. ? Lie down with your legs higher than your head. Use pillows to prop up your feet. · If you have a headache:  ? Lie down. ? Ask your partner or a good friend for a neck massage. ? Try cool cloths over your forehead or across the back of your neck. ? Use acetaminophen (Tylenol) for pain relief. Do not use nonsteroidal anti-inflammatory drugs (NSAIDs), such as ibuprofen (Advil, Motrin) or naproxen (Aleve), unless your doctor says it is okay. · If you have a nosebleed, pinch your nose gently, and hold it for a short while. To prevent nosebleeds, try massaging a small dab of petroleum jelly, such as Vaseline, in your nostrils. · If your nose is stuffed up, try saline (saltwater) nose sprays. Do not use decongestant sprays. Care for your breasts  · Wear a bra that gives you good support. · Know that changes in your breasts are normal.  ? Your breasts may get larger and more tender. Tenderness usually gets better by 12 weeks. ? Your nipples may get darker and larger, and small bumps around your nipples may show more. ? The veins in your chest and breasts may show more. · Don't worry about \"toughening'\" your nipples. Breastfeeding will naturally do this. Where can you learn more? Go to https://[employer]Elixserve. org and sign in to your [employer] account. Enter B769 in the Toothpick box to learn more about \"Weeks 10 to 14 of Your Pregnancy: Care Instructions. \"     If you do not have an account, please click on the \"Sign Up Now\" link. Current as of: February 11, 2020               Content Version: 12.6  © 6425-7769 [employer]. Care instructions adapted under license by [facility] ([facility]). If you have questions about a medical condition or this instruction, always ask your healthcare professional. [place] [address] any warranty or liability for your use of this information. Patient Education        Weeks 14 to 25 of Your Pregnancy: Care Instructions  Your Care Instructions     During this time, you may start to \"show,\" so that you look pregnant to people around you. You may also notice some changes in your skin, such as itchy spots on your palms or acne on your face. Your baby is now able to pass urine, and your baby's first stool (meconium) is starting to collect in his or her intestines. Hair is also beginning to grow on your baby's head. At your next visit, between weeks 18 and 20, your doctor may do an ultrasound test. The test allows your doctor to check for certain problems. Your doctor can also tell the sex of your baby. This is a good time to think about whether you want to know whether your baby is a boy or a girl. Talk to your doctor about getting a flu shot to help keep you healthy during your pregnancy. As your pregnancy moves along, it is common to worry or feel anxious. Your body is changing a lot. And you are thinking about giving birth, the health of your baby, and becoming a parent. You can learn to cope with any anxiety and stress you feel. Follow-up care is a key part of your treatment and safety. Be sure to make and go to all appointments, and call your doctor if you are having problems. It's also a good idea to know your test results and keep a list of the medicines you take. How can you care for yourself at home? Reduce stress    · Ask for help with cooking and housekeeping.     · Figure out who or what causes your stress. Avoid these people or situations as much as possible.     · Relax every day. Taking 10- to 15-minute breaks can make a big difference. Take a walk, listen to music, or take a warm bath.     · Learn relaxation techniques at prenatal or yoga class. Or buy a relaxation tape.     · List your fears about having a baby and becoming a parent. Share the list with someone you trust. Decide which worries are really small, and try to let them go. Exercise    · If you did not exercise much before pregnancy, start slowly. Walking is best. [employer], and do a little more every day.     · Brisk walking, easy jogging, low-impact aerobics, water aerobics, and yoga are good choices. Some sports, such as scuba diving, horseback riding, downhill skiing, gymnastics, and water skiing, are not a good idea.   · Try to do at least 2½ hours a week of moderate exercise, such as a fast walk. One way to do this is to be active 30 minutes a day, at least 5 days a week. It's fine to be active in blocks of 10 minutes or more throughout your day and week.     · Wear loose clothing. And wear shoes and a bra that provide good support.     · Warm up and cool down to start and finish your exercise.     · If you want to use weights, be sure to use light weights. They reduce stress on your joints. Stay at the best weight for you    · Experts recommend that you gain about 1 pound a month during the first 3 months of your pregnancy.     · Experts recommend that you gain about 1 pound a week during your last 6 months of pregnancy, for a total weight gain of 25 to 35 pounds.     · If you are underweight, you will need to gain more weight (about 28 to 40 pounds).     · If you are overweight, you may not need to gain as much weight (about 15 to 25 pounds).     · If you are gaining weight too fast, use common sense. Exercise every day, and limit sweets, fast foods, and fats. Choose lean meats, fruits, and vegetables.     · If you are having twins or more, your doctor may refer you to a dietitian. Where can you learn more? Go to https://T[employer]M Power.Outlisten. org and sign in to your [employer] account. Enter C366 in the [facility] box to learn more about \"Weeks 14 to 18 of Your Pregnancy: Care Instructions. \"     If you do not have an account, please click on the \"Sign Up Now\" link. Current as of: February 11, 2020               Content Version: 12.6  © 6127-5642 [employer] Inc[employer]. Care instructions adapted under license by [facility] ([facility]). If you have questions about a medical condition or this instruction, always ask your healthcare professional. Scarlett Dacosta any warranty or liability for your use of this information.                    Fifi Hammerrow Pool,1/5/2021 1:32 PM

## 2021-01-05 NOTE — PATIENT INSTRUCTIONS
Patient Education        Weeks 10 to 14 of Your Pregnancy: Care Instructions  Your Care Instructions     By weeks 10 to 14 of your pregnancy, the placenta has formed inside your uterus. It is possible to hear your baby's heartbeat with a special ultrasound device. Your baby's eyes can and do move. The arms and legs can bend. This is a good time to think about testing for birth defects. There are two types of tests: screening and diagnostic. Screening tests show the chance that a baby has a certain birth defect. They can't tell you for sure that your baby has a problem. Diagnostic tests show if a baby has a certain birth defect. It's your choice whether to have these tests. You and your partner can talk to your doctor or midwife about birth defects tests. Follow-up care is a key part of your treatment and safety. Be sure to make and go to all appointments, and call your doctor if you are having problems. It's also a good idea to know your test results and keep a list of the medicines you take. How can you care for yourself at home? Decide about tests  · You can have screening tests and diagnostic tests to check for birth defects. The decision to have a test for birth defects is personal. Think about your age, your chance of passing on a family disease, your need to know about any problems, and what you might do after you have the test results. ? Triple or quadruple (quad) blood tests. These screening tests can be done between 15 and 20 weeks of pregnancy. They check the amounts of three or four substances in your blood. The doctor looks at these test results, along with your age and other factors, to find out the chance that your baby may have certain problems. ? Amniocentesis. This diagnostic test is used to look for chromosomal problems in the baby's cells. It can be done between 15 and 20 weeks of pregnancy, usually around week 16. ? Nuchal translucency test. This test uses ultrasound to measure the thickness of the area at the back of the baby's neck. An increase in the thickness can be an early sign of Down syndrome. ? Chorionic villus sampling (CVS). This is a test that looks for certain genetic problems with your baby. The same genes that are in your baby are in the placenta. A small piece of the placenta is taken out and tested. This test is done when you are 10 to 13 weeks pregnant. Ease discomfort  · Slow down and take naps when you feel tired. · If your emotions swing, talk to someone. Crying, anxiety, and concentration problems are common. · If your gums bleed, try a softer toothbrush. If your gums are puffy and bleed a lot, see your dentist.  · If you feel dizzy:  ? Get up slowly after sitting or lying down. ? Drink plenty of fluids. ? Eat small snacks to keep your blood sugar stable. ? Put your head between your legs as though you were tying your shoelaces. ? Lie down with your legs higher than your head. Use pillows to prop up your feet. · If you have a headache:  ? Lie down. ? Ask your partner or a good friend for a neck massage. ? Try cool cloths over your forehead or across the back of your neck. ? Use acetaminophen (Tylenol) for pain relief. Do not use nonsteroidal anti-inflammatory drugs (NSAIDs), such as ibuprofen (Advil, Motrin) or naproxen (Aleve), unless your doctor says it is okay. · If you have a nosebleed, pinch your nose gently, and hold it for a short while. To prevent nosebleeds, try massaging a small dab of petroleum jelly, such as Vaseline, in your nostrils. · If your nose is stuffed up, try saline (saltwater) nose sprays. Do not use decongestant sprays. Care for your breasts  · Wear a bra that gives you good support. · Know that changes in your breasts are normal.  ? Your breasts may get larger and more tender. Tenderness usually gets better by 12 weeks. As your pregnancy moves along, it is common to worry or feel anxious. Your body is changing a lot. And you are thinking about giving birth, the health of your baby, and becoming a parent. You can learn to cope with any anxiety and stress you feel. Follow-up care is a key part of your treatment and safety. Be sure to make and go to all appointments, and call your doctor if you are having problems. It's also a good idea to know your test results and keep a list of the medicines you take. How can you care for yourself at home? Reduce stress    · Ask for help with cooking and housekeeping.     · Figure out who or what causes your stress. Avoid these people or situations as much as possible.     · Relax every day. Taking 10- to 15-minute breaks can make a big difference. Take a walk, listen to music, or take a warm bath.     · Learn relaxation techniques at prenatal or yoga class. Or buy a relaxation tape.     · List your fears about having a baby and becoming a parent. Share the list with someone you trust. Decide which worries are really small, and try to let them go. Exercise    · If you did not exercise much before pregnancy, start slowly. Walking is best. [employer], and do a little more every day.     · Brisk walking, easy jogging, low-impact aerobics, water aerobics, and yoga are good choices. Some sports, such as scuba diving, horseback riding, downhill skiing, gymnastics, and water skiing, are not a good idea.     · Try to do at least 2½ hours a week of moderate exercise, such as a fast walk. One way to do this is to be active 30 minutes a day, at least 5 days a week. It's fine to be active in blocks of 10 minutes or more throughout your day and week.     · Wear loose clothing. And wear shoes and a bra that provide good support.     · Warm up and cool down to start and finish your exercise.     · If you want to use weights, be sure to use light weights. They reduce stress on your joints. Stay at the best weight for you    · Experts recommend that you gain about 1 pound a month during the first 3 months of your pregnancy.     · Experts recommend that you gain about 1 pound a week during your last 6 months of pregnancy, for a total weight gain of 25 to 35 pounds.     · If you are underweight, you will need to gain more weight (about 28 to 40 pounds).     · If you are overweight, you may not need to gain as much weight (about 15 to 25 pounds).     · If you are gaining weight too fast, use common sense. Exercise every day, and limit sweets, fast foods, and fats. Choose lean meats, fruits, and vegetables.     · If you are having twins or more, your doctor may refer you to a dietitian. Where can you learn more? Go to https://YouAppimalaeb.Abyz. org and sign in to your [employer] account. Enter W910 in the [employer] box to learn more about \"Weeks 14 to 18 of Your Pregnancy: Care Instructions. \"     If you do not have an account, please click on the \"Sign Up Now\" link. Current as of: February 11, 2020               Content Version: 12.6  © 9025-3370 [employer]. Care instructions adapted under license by [facility] [address] If you have questions about a medical condition or this instruction, always ask your healthcare professional. [address] any warranty or liability for your use of this information.          Patient Education

## 2021-01-14 LAB — CYTOLOGY REPORT: NORMAL

## 2021-01-18 ENCOUNTER — HOSPITAL ENCOUNTER (OUTPATIENT)
Age: 26
Setting detail: OBSERVATION
Discharge: HOME | End: 2021-01-19
Attending: EMERGENCY MEDICINE | Admitting: ADVANCED PRACTICE MIDWIFE
Payer: MEDICARE

## 2021-01-18 ENCOUNTER — APPOINTMENT (OUTPATIENT)
Dept: ULTRASOUND IMAGING | Age: 26
End: 2021-01-18
Payer: MEDICARE

## 2021-01-18 DIAGNOSIS — N20.0 RENAL CALCULUS, RIGHT: ICD-10-CM

## 2021-01-18 DIAGNOSIS — O03.9 SPONTANEOUS ABORTION: Primary | ICD-10-CM

## 2021-01-18 PROBLEM — R10.31 CONTINUOUS RLQ ABDOMINAL PAIN: Status: ACTIVE | Noted: 2021-01-18

## 2021-01-18 PROBLEM — Z3A.14 14 WEEKS GESTATION OF PREGNANCY: Status: ACTIVE | Noted: 2021-01-18

## 2021-01-18 LAB
-: ABNORMAL
ABO/RH: NORMAL
ABSOLUTE EOS #: 0.09 K/UL (ref 0–0.44)
ABSOLUTE EOS #: <0.03 K/UL (ref 0–0.44)
ABSOLUTE IMMATURE GRANULOCYTE: 0.05 K/UL (ref 0–0.3)
ABSOLUTE IMMATURE GRANULOCYTE: 0.08 K/UL (ref 0–0.3)
ABSOLUTE LYMPH #: 0.85 K/UL (ref 1.1–3.7)
ABSOLUTE LYMPH #: 1.97 K/UL (ref 1.1–3.7)
ABSOLUTE MONO #: 0.62 K/UL (ref 0.1–1.2)
ABSOLUTE MONO #: 0.69 K/UL (ref 0.1–1.2)
ALBUMIN SERPL-MCNC: 4.2 G/DL (ref 3.5–5.2)
ALBUMIN/GLOBULIN RATIO: 1.3 (ref 1–2.5)
ALP BLD-CCNC: 101 U/L (ref 35–104)
ALT SERPL-CCNC: 30 U/L (ref 5–33)
AMORPHOUS: ABNORMAL
ANION GAP SERPL CALCULATED.3IONS-SCNC: 12 MMOL/L (ref 9–17)
ANTIBODY SCREEN: NEGATIVE
ARM BAND NUMBER: NORMAL
AST SERPL-CCNC: 22 U/L
BACTERIA: ABNORMAL
BASOPHILS # BLD: 0 % (ref 0–2)
BASOPHILS # BLD: 0 % (ref 0–2)
BASOPHILS ABSOLUTE: 0.03 K/UL (ref 0–0.2)
BASOPHILS ABSOLUTE: 0.03 K/UL (ref 0–0.2)
BILIRUB SERPL-MCNC: 0.31 MG/DL (ref 0.3–1.2)
BILIRUBIN URINE: NEGATIVE
BUN BLDV-MCNC: 10 MG/DL (ref 6–20)
BUN/CREAT BLD: 15 (ref 9–20)
CALCIUM SERPL-MCNC: 9.9 MG/DL (ref 8.6–10.4)
CASTS UA: ABNORMAL /LPF
CHLORIDE BLD-SCNC: 99 MMOL/L (ref 98–107)
CO2: 21 MMOL/L (ref 20–31)
COLOR: YELLOW
COMMENT UA: ABNORMAL
CREAT SERPL-MCNC: 0.67 MG/DL (ref 0.5–0.9)
CRYSTALS, UA: ABNORMAL /HPF
DIFFERENTIAL TYPE: ABNORMAL
DIFFERENTIAL TYPE: ABNORMAL
EOSINOPHILS RELATIVE PERCENT: 0 % (ref 1–4)
EOSINOPHILS RELATIVE PERCENT: 1 % (ref 1–4)
EPITHELIAL CELLS UA: ABNORMAL /HPF (ref 0–25)
EXPIRATION DATE: NORMAL
GFR AFRICAN AMERICAN: >60 ML/MIN
GFR NON-AFRICAN AMERICAN: >60 ML/MIN
GFR SERPL CREATININE-BSD FRML MDRD: ABNORMAL ML/MIN/{1.73_M2}
GFR SERPL CREATININE-BSD FRML MDRD: ABNORMAL ML/MIN/{1.73_M2}
GLUCOSE BLD-MCNC: 100 MG/DL (ref 70–99)
GLUCOSE URINE: NEGATIVE
HCG QUANTITATIVE: ABNORMAL IU/L
HCT VFR BLD CALC: 37.6 % (ref 36.3–47.1)
HCT VFR BLD CALC: 41.4 % (ref 36.3–47.1)
HEMOGLOBIN: 12.9 G/DL (ref 11.9–15.1)
HEMOGLOBIN: 14.4 G/DL (ref 11.9–15.1)
IMMATURE GRANULOCYTES: 1 %
IMMATURE GRANULOCYTES: 1 %
KETONES, URINE: ABNORMAL
LACTIC ACID: 1 MMOL/L (ref 0.5–2.2)
LEUKOCYTE ESTERASE, URINE: NEGATIVE
LIPASE: 21 U/L (ref 13–60)
LYMPHOCYTES # BLD: 19 % (ref 24–43)
LYMPHOCYTES # BLD: 5 % (ref 24–43)
MCH RBC QN AUTO: 30.6 PG (ref 25.2–33.5)
MCH RBC QN AUTO: 30.7 PG (ref 25.2–33.5)
MCHC RBC AUTO-ENTMCNC: 34.3 G/DL (ref 28.4–34.8)
MCHC RBC AUTO-ENTMCNC: 34.8 G/DL (ref 28.4–34.8)
MCV RBC AUTO: 88.3 FL (ref 82.6–102.9)
MCV RBC AUTO: 89.1 FL (ref 82.6–102.9)
MONOCYTES # BLD: 4 % (ref 3–12)
MONOCYTES # BLD: 7 % (ref 3–12)
MUCUS: ABNORMAL
NITRITE, URINE: NEGATIVE
NRBC AUTOMATED: 0 PER 100 WBC
NRBC AUTOMATED: 0 PER 100 WBC
OTHER OBSERVATIONS UA: ABNORMAL
PDW BLD-RTO: 11.9 % (ref 11.8–14.4)
PDW BLD-RTO: 12 % (ref 11.8–14.4)
PH UA: 5.5 (ref 5–9)
PLATELET # BLD: 284 K/UL (ref 138–453)
PLATELET # BLD: 338 K/UL (ref 138–453)
PLATELET ESTIMATE: ABNORMAL
PLATELET ESTIMATE: ABNORMAL
PMV BLD AUTO: 9.7 FL (ref 8.1–13.5)
PMV BLD AUTO: 9.7 FL (ref 8.1–13.5)
POTASSIUM SERPL-SCNC: 3.7 MMOL/L (ref 3.7–5.3)
PROTEIN UA: ABNORMAL
RBC # BLD: 4.22 M/UL (ref 3.95–5.11)
RBC # BLD: 4.69 M/UL (ref 3.95–5.11)
RBC # BLD: ABNORMAL 10*6/UL
RBC # BLD: ABNORMAL 10*6/UL
RBC UA: ABNORMAL /HPF (ref 0–2)
RENAL EPITHELIAL, UA: ABNORMAL /HPF
SEG NEUTROPHILS: 72 % (ref 36–65)
SEG NEUTROPHILS: 90 % (ref 36–65)
SEGMENTED NEUTROPHILS ABSOLUTE COUNT: 14.36 K/UL (ref 1.5–8.1)
SEGMENTED NEUTROPHILS ABSOLUTE COUNT: 7.41 K/UL (ref 1.5–8.1)
SODIUM BLD-SCNC: 132 MMOL/L (ref 135–144)
SPECIFIC GRAVITY UA: >1.03 (ref 1.01–1.02)
TOTAL PROTEIN: 7.5 G/DL (ref 6.4–8.3)
TRICHOMONAS: ABNORMAL
TURBIDITY: CLEAR
URINE HGB: ABNORMAL
UROBILINOGEN, URINE: NORMAL
WBC # BLD: 10.2 K/UL (ref 3.5–11.3)
WBC # BLD: 16 K/UL (ref 3.5–11.3)
WBC # BLD: ABNORMAL 10*3/UL
WBC # BLD: ABNORMAL 10*3/UL
WBC UA: ABNORMAL /HPF (ref 0–5)
YEAST: ABNORMAL

## 2021-01-18 PROCEDURE — 2580000003 HC RX 258: Performed by: ADVANCED PRACTICE MIDWIFE

## 2021-01-18 PROCEDURE — 85025 COMPLETE CBC W/AUTO DIFF WBC: CPT

## 2021-01-18 PROCEDURE — 96375 TX/PRO/DX INJ NEW DRUG ADDON: CPT

## 2021-01-18 PROCEDURE — 87086 URINE CULTURE/COLONY COUNT: CPT

## 2021-01-18 PROCEDURE — 83605 ASSAY OF LACTIC ACID: CPT

## 2021-01-18 PROCEDURE — 86850 RBC ANTIBODY SCREEN: CPT

## 2021-01-18 PROCEDURE — 80053 COMPREHEN METABOLIC PANEL: CPT

## 2021-01-18 PROCEDURE — 6360000002 HC RX W HCPCS: Performed by: EMERGENCY MEDICINE

## 2021-01-18 PROCEDURE — 96376 TX/PRO/DX INJ SAME DRUG ADON: CPT

## 2021-01-18 PROCEDURE — 6360000002 HC RX W HCPCS: Performed by: ADVANCED PRACTICE MIDWIFE

## 2021-01-18 PROCEDURE — 99282 EMERGENCY DEPT VISIT SF MDM: CPT

## 2021-01-18 PROCEDURE — 86901 BLOOD TYPING SEROLOGIC RH(D): CPT

## 2021-01-18 PROCEDURE — 99219 PR INITIAL OBSERVATION CARE/DAY 50 MINUTES: CPT | Performed by: ADVANCED PRACTICE MIDWIFE

## 2021-01-18 PROCEDURE — 76817 TRANSVAGINAL US OBSTETRIC: CPT

## 2021-01-18 PROCEDURE — 36415 COLL VENOUS BLD VENIPUNCTURE: CPT

## 2021-01-18 PROCEDURE — 76770 US EXAM ABDO BACK WALL COMP: CPT

## 2021-01-18 PROCEDURE — G0378 HOSPITAL OBSERVATION PER HR: HCPCS

## 2021-01-18 PROCEDURE — 83690 ASSAY OF LIPASE: CPT

## 2021-01-18 PROCEDURE — 84702 CHORIONIC GONADOTROPIN TEST: CPT

## 2021-01-18 PROCEDURE — 96365 THER/PROPH/DIAG IV INF INIT: CPT

## 2021-01-18 PROCEDURE — 76705 ECHO EXAM OF ABDOMEN: CPT

## 2021-01-18 PROCEDURE — 81001 URINALYSIS AUTO W/SCOPE: CPT

## 2021-01-18 PROCEDURE — 76805 OB US >/= 14 WKS SNGL FETUS: CPT

## 2021-01-18 PROCEDURE — 96374 THER/PROPH/DIAG INJ IV PUSH: CPT

## 2021-01-18 PROCEDURE — 86900 BLOOD TYPING SEROLOGIC ABO: CPT

## 2021-01-18 PROCEDURE — 2580000003 HC RX 258: Performed by: EMERGENCY MEDICINE

## 2021-01-18 RX ORDER — ONDANSETRON 2 MG/ML
4 INJECTION INTRAMUSCULAR; INTRAVENOUS ONCE
Status: COMPLETED | OUTPATIENT
Start: 2021-01-18 | End: 2021-01-18

## 2021-01-18 RX ORDER — PROMETHAZINE HYDROCHLORIDE 25 MG/ML
25 INJECTION, SOLUTION INTRAMUSCULAR; INTRAVENOUS EVERY 4 HOURS PRN
Status: DISCONTINUED | OUTPATIENT
Start: 2021-01-18 | End: 2021-01-19 | Stop reason: HOSPADM

## 2021-01-18 RX ORDER — ZOLPIDEM TARTRATE 5 MG/1
5 TABLET ORAL NIGHTLY PRN
Status: DISCONTINUED | OUTPATIENT
Start: 2021-01-18 | End: 2021-01-19 | Stop reason: HOSPADM

## 2021-01-18 RX ORDER — MEPERIDINE HYDROCHLORIDE 25 MG/ML
25 INJECTION INTRAMUSCULAR; INTRAVENOUS; SUBCUTANEOUS ONCE
Status: COMPLETED | OUTPATIENT
Start: 2021-01-18 | End: 2021-01-18

## 2021-01-18 RX ORDER — SODIUM CHLORIDE, SODIUM LACTATE, POTASSIUM CHLORIDE, CALCIUM CHLORIDE 600; 310; 30; 20 MG/100ML; MG/100ML; MG/100ML; MG/100ML
INJECTION, SOLUTION INTRAVENOUS CONTINUOUS
Status: DISCONTINUED | OUTPATIENT
Start: 2021-01-18 | End: 2021-01-19 | Stop reason: HOSPADM

## 2021-01-18 RX ORDER — PROMETHAZINE HYDROCHLORIDE 25 MG/ML
25 INJECTION, SOLUTION INTRAMUSCULAR; INTRAVENOUS ONCE
Status: COMPLETED | OUTPATIENT
Start: 2021-01-18 | End: 2021-01-18

## 2021-01-18 RX ORDER — 0.9 % SODIUM CHLORIDE 0.9 %
1000 INTRAVENOUS SOLUTION INTRAVENOUS ONCE
Status: COMPLETED | OUTPATIENT
Start: 2021-01-18 | End: 2021-01-18

## 2021-01-18 RX ORDER — MEPERIDINE HYDROCHLORIDE 25 MG/ML
25 INJECTION INTRAMUSCULAR; INTRAVENOUS; SUBCUTANEOUS EVERY 4 HOURS PRN
Status: DISCONTINUED | OUTPATIENT
Start: 2021-01-18 | End: 2021-01-19 | Stop reason: HOSPADM

## 2021-01-18 RX ORDER — MORPHINE SULFATE 4 MG/ML
4 INJECTION, SOLUTION INTRAMUSCULAR; INTRAVENOUS ONCE
Status: COMPLETED | OUTPATIENT
Start: 2021-01-18 | End: 2021-01-18

## 2021-01-18 RX ORDER — ACETAMINOPHEN 500 MG
1000 TABLET ORAL EVERY 6 HOURS PRN
Status: DISCONTINUED | OUTPATIENT
Start: 2021-01-18 | End: 2021-01-19 | Stop reason: HOSPADM

## 2021-01-18 RX ADMIN — PROMETHAZINE HYDROCHLORIDE 25 MG: 25 INJECTION, SOLUTION INTRAMUSCULAR; INTRAVENOUS at 20:35

## 2021-01-18 RX ADMIN — SODIUM CHLORIDE 1000 ML: 9 INJECTION, SOLUTION INTRAVENOUS at 17:15

## 2021-01-18 RX ADMIN — MEPERIDINE HYDROCHLORIDE 25 MG: 25 INJECTION INTRAMUSCULAR; INTRAVENOUS; SUBCUTANEOUS at 20:32

## 2021-01-18 RX ADMIN — HYDROMORPHONE HYDROCHLORIDE 1 MG: 1 INJECTION, SOLUTION INTRAMUSCULAR; INTRAVENOUS; SUBCUTANEOUS at 18:50

## 2021-01-18 RX ADMIN — SODIUM CHLORIDE, POTASSIUM CHLORIDE, SODIUM LACTATE AND CALCIUM CHLORIDE: 600; 310; 30; 20 INJECTION, SOLUTION INTRAVENOUS at 20:09

## 2021-01-18 RX ADMIN — ONDANSETRON 4 MG: 2 INJECTION INTRAMUSCULAR; INTRAVENOUS at 17:15

## 2021-01-18 RX ADMIN — ONDANSETRON 4 MG: 2 INJECTION INTRAMUSCULAR; INTRAVENOUS at 18:50

## 2021-01-18 RX ADMIN — MORPHINE SULFATE 4 MG: 4 INJECTION, SOLUTION INTRAMUSCULAR; INTRAVENOUS at 17:15

## 2021-01-18 RX ADMIN — SODIUM CHLORIDE 1.5 G: 9 INJECTION, SOLUTION INTRAVENOUS at 23:05

## 2021-01-18 ASSESSMENT — PAIN SCALES - GENERAL
PAINLEVEL_OUTOF10: 10
PAINLEVEL_OUTOF10: 10

## 2021-01-18 ASSESSMENT — PAIN DESCRIPTION - PAIN TYPE: TYPE: ACUTE PAIN

## 2021-01-18 ASSESSMENT — PAIN DESCRIPTION - LOCATION: LOCATION: ABDOMEN

## 2021-01-18 ASSESSMENT — ENCOUNTER SYMPTOMS
ABDOMINAL PAIN: 1
VOMITING: 1
NAUSEA: 1
COLOR CHANGE: 0
COUGH: 0

## 2021-01-18 NOTE — ED PROVIDER NOTES
[facility] [place] ED  eMERGENCY dEPARTMENT eNCOUnter      Pt Name: Humera Carroll  MRN: [id number]  [place] 1995  Date of evaluation: 1/18/2021  Provider: Carlene Gaming, [address] [place] [address]       Chief Complaint   Patient presents with    Abdominal Pain     RLQ abdominal pain. onset last night. ..worse today. 14 weeks pregnant         HISTORY OF PRESENT ILLNESS   (Location/Symptom, Timing/Onset, Context/Setting, Quality, Duration, Modifying Factors, Severity) Note limiting factors. HPI    Humera Carroll is a 31 y.o. female who presents to the emergency department with complaint of abdominal pain. Patient is a G3, P3 female who states that she is approximately 14 weeks pregnant. She reports that yesterday around 7 PM she noted some lower right-sided abdominal pain. She states the pain resolved within around 2 PM today she developed intractable pain with nausea and vomiting. Patient denies any dysuria or vaginal bleeding or discharge her with the persistent symptoms comes in for evaluation    Nursing Notes were reviewed. REVIEW OF SYSTEMS    (2+ for level 4; 10+ for level 5)   Review of Systems   Constitutional: Negative for chills and fever. HENT: Negative for congestion. Respiratory: Negative for cough. Cardiovascular: Negative for chest pain. Gastrointestinal: Positive for abdominal pain, nausea and vomiting. Genitourinary: Negative for dysuria, vaginal bleeding, vaginal discharge and vaginal pain. Musculoskeletal: Negative for myalgias. Skin: Negative for color change. Neurological: Negative for syncope. Hematological: Does not bruise/bleed easily. All other systems reviewed and are negative.       PAST MEDICAL HISTORY     Past Medical History:   Diagnosis Date    Deafness     Ganglion cyst of wrist 2010    left    Hereditary nephropathy (Alport's) as achild     had renal biopsy    Irritable bowel syndrome     Kidney disease     Microscopic hematuria     Obesity  Proteinuria     Sensorineural hearing loss     Thin basement membrane disease     Bx proven       SURGICAL HISTORY       Past Surgical History:   Procedure Laterality Date    COLONOSCOPY  2009    Dr. Chey Adames Left 8/18/2015    CYSTOSCOPY  11/14/2018    with stent placement    KIDNEY BIOPSY  12/29/98    LAPAROSCOPY      [place] CYSTO/URETERO W/LITHOTRIPSY &INDWELL STENT INSRT Right 11/14/2018    CYSTOSCOPY URETEROSCOPY LASER, HLL performed by Nikolas Anderson MD at [facility] &INDWELL STENT INSRT Right 11/14/2018    CYSTOSCOPY STENT INSERTION performed by Nikolas Anderson MD at [address] [place] [address] URETEROSCOPY  8-    WISDOM TOOTH EXTRACTION      WRIST SURGERY  2010    cyst removal       CURRENT MEDICATIONS       Previous Medications    ACETAMINOPHEN (TYLENOL) 500 MG TABLET    Take 500 mg by mouth every 6 hours as needed for Pain    DICYCLOMINE (BENTYL) 10 MG CAPSULE    Take 1 capsule by mouth 4 times daily (before meals and nightly)    HYOSCYAMINE (LEVSIN/SL) 125 MCG SUBLINGUAL TABLET    Place 1 tablet under the tongue every 6 hours as needed (abdominal pain)    KETOROLAC (TORADOL) 10 MG TABLET    Take 1 tablet by mouth every 6 hours as needed for Pain    NAPROXEN (NAPROSYN) 500 MG TABLET    Take 1 tablet by mouth 2 times daily as needed for Pain (/ Take with food and a full glass of water)    PRENATAL VIT-FE FUMARATE-FA (PRENATAL VITAMIN PO)    Take by mouth    SERTRALINE (ZOLOFT) 50 MG TABLET    TAKE 1 TABLET BY MOUTH ONCE A DAY       ALLERGIES     Patient has no known allergies.     FAMILY HISTORY       Family History   Problem Relation Age of Onset    Cancer Paternal Grandmother         bone    High Cholesterol Mother     Cancer Mother         breast    High Blood Pressure Mother    Vongregge Searing Migraines Mother     Alcohol Abuse Father     Anxiety Disorder Father     Depression Father     Hearing Loss Brother     Hearing Loss Brother         SOCIAL HISTORY Social History     Socioeconomic History    Marital status: Single     Spouse name: Not on file    Number of children: Not on file    Years of education: Not on file    Highest education level: Not on file   Occupational History    Not on file   Social Needs    Financial resource strain: Not on file    Food insecurity     Worry: Not on file     Inability: Not on file    Transportation needs     Medical: Not on file     Non-medical: Not on file   Tobacco Use    Smoking status: Former Smoker     Types: Cigarettes     Quit date: 3/18/2014     Years since quittin.8    Smokeless tobacco: Never Used    Tobacco comment: several cigarettes daily   Substance and Sexual Activity    Alcohol use: No     Alcohol/week: 0.0 standard drinks    Drug use: No    Sexual activity: Yes     Partners: Male   Lifestyle    Physical activity     Days per week: Not on file     Minutes per session: Not on file    Stress: Not on file   Relationships    Social connections     Talks on phone: Not on file     Gets together: Not on file     Attends Muslim service: Not on file     Active member of club or organization: Not on file     Attends meetings of clubs or organizations: Not on file     Relationship status: Not on file    Intimate partner violence     Fear of current or ex partner: Not on file     Emotionally abused: Not on file     Physically abused: Not on file     Forced sexual activity: Not on file   Other Topics Concern    Not on file   Social History Narrative    Not on file       SCREENINGS           PHYSICAL EXAM    (up to 7 for level 4, 8 or more for level 5)   @[email or web address]    Physical Exam  Vitals signs and nursing note reviewed. Constitutional:       General: She is not in acute distress. Appearance: She is well-developed. She is obese. She is not ill-appearing, toxic-appearing or diaphoretic. HENT:      Head: Normocephalic and atraumatic.    Cardiovascular:      Rate and Rhythm: Normal rate and regular rhythm. Heart sounds: Normal heart sounds. No murmur. No friction rub. No gallop. Pulmonary:      Effort: Pulmonary effort is normal. No respiratory distress. Breath sounds: Normal breath sounds. No wheezing, rhonchi or rales. Abdominal:      Hernia: No hernia is present. Comments: Abdomen is obese soft and nondistended with hyperactive bowel sounds. There is pain on palpation in the midepigastric right upper quadrant but greatest in the right lower quadrant. There is voluntary guarding at the site. No CVA pain   Skin:     General: Skin is warm and dry. Capillary Refill: Capillary refill takes less than 2 seconds. Findings: No rash. Neurological:      General: No focal deficit present. Mental Status: She is alert and oriented to person, place, and time. Cranial Nerves: No cranial nerve deficit. Motor: No weakness. Psychiatric:         Mood and Affect: Mood is anxious. DIAGNOSTIC RESULTS     EKG (Per Emergency Physician):       RADIOLOGY (Per Emergency Physician): Interpretation per the Radiologist below, if available at the time of this note:  Us Ob 14 Plus Weeks Single Or First Gestation    Result Date: 1/18/2021  EXAMINATION: TRANSABDOMINAL SECOND/THIRD TRIMESTER OBSTETRIC PELVIC ULTRASOUND WITH COLOR DOPPLER FLOW 1/18/2021 5:34 pm TECHNIQUE: TRANSABDOMINAL PELVIC ULTRASOUND WITH COLOR DOPPLER FLOW HISTORY: ORDERING SYSTEM PROVIDED HISTORY: RLQ abd pain TECHNOLOGIST PROVIDED HISTORY: RLQ abd pain FINDINGS: GENERAL OBSERVATIONS: PREGNANCY:   Single CARDIAC ACTIVITY:  Yes FETAL HEART RATE: 155 beats per minute FETAL BODY & LIMB MOVEMENTS:  No FETAL POSITION:  Cephalic PLACENTA LOCATION:  Anterior MARIA M:   Low FETAL ANATOMY: Fetal anatomy is not well evaluated on these images.  ESTIMATED FETAL AGE: CURRENT US:  15 weeks 0 days +/-1 week MEASUREMENTS: BPD: 2.9 cm, 70%tile HEAD CIRCUMFERENCE:  10.1 cm, 28%tile CERVICAL LENGTH:  Cervical os appears open with fluid throughout their cervical canal.  Low-lying fetus at the lower uterine segment/cervix. Low-lying live fetus at the lower uterine segment/cervix with open cervical os and fluid throughout the cervical canal.  Correlate with presentation. ED BEDSIDE ULTRASOUND:   Performed by ED Physician - none    LABS:  Labs Reviewed   CBC WITH AUTO DIFFERENTIAL - Abnormal; Notable for the following components:       Result Value    Seg Neutrophils 72 (*)     Lymphocytes 19 (*)     Immature Granulocytes 1 (*)     All other components within normal limits   COMPREHENSIVE METABOLIC PANEL - Abnormal; Notable for the following components:    Glucose 100 (*)     Sodium 132 (*)     All other components within normal limits   LIPASE   LACTIC ACID   URINALYSIS WITH MICROSCOPIC   HCG, QUANTITATIVE, PREGNANCY   ABO/RH   RHOGAM INJECTION ONLY        All other labs were within normal range or not returned as of this dictation. EMERGENCY DEPARTMENT COURSE and DIFFERENTIAL DIAGNOSIS/MDM:   Vitals:    Vitals:    01/18/21 1641 01/18/21 1655 01/18/21 1911   BP: (!) 199/180  139/84   Pulse: 110  87   Resp: 20  20   Temp:  98.3 °F (36.8 °C)    TempSrc:  Oral    SpO2: 98%  98%       Medications   0.9 % sodium chloride bolus (0 mLs Intravenous Stopped 1/18/21 1911)   morphine injection 4 mg (4 mg Intravenous Given 1/18/21 1715)   ondansetron (ZOFRAN) injection 4 mg (4 mg Intravenous Given 1/18/21 1715)   HYDROmorphone (DILAUDID) injection 1 mg (1 mg Intravenous Given 1/18/21 1850)   ondansetron (ZOFRAN) injection 4 mg (4 mg Intravenous Given 1/18/21 1850)       MDM. Patient presented with pain mainly in the right lower quadrant at 14 weeks pregnant. There is concern she may have a kidney stone of acute appendicitis ectopic pregnancy or miscarriage present so basic labs and ultrasound ordered. Ultrasound shows an open cervix concerning for spontaneous miscarriage.   I discussed the case with OB on-call Dr. Vivienne Maldonado she states based on the patient's 14-week gestation and changes consistent with active miscarriage she recommends admission to the hospital.  Patient was treated with morphine and Dilaudid and Zofran in the ER to help with pain control nausea given a liter of fluid and as her blood type is A- RhoGam was ordered. REVAL:         CRITICAL CARE TIME   Total Critical Care time was minutes, excluding separately reportable procedures. There was a high probability of clinically significant/life threatening deterioration in the patient's condition which required my urgent intervention. CONSULTS:  None    PROCEDURES:  Unless otherwise noted below, none     Procedures    FINAL IMPRESSION      1. Spontaneous           DISPOSITION/PLAN   DISPOSITION Decision To Admit 2021 07:14:15 PM      PATIENT REFERRED TO:  No follow-up provider specified. DISCHARGE MEDICATIONS:  New Prescriptions    No medications on file          (Please note:  Portions of this note were completed with a voice recognition program.  Efforts were made to edit the dictations but occasionally words and phrases are mis-transcribed.)  Form v2016. J.[id number]    Petar Aviles DO (electronically signed)  Emergency Medicine Provider       DO Yudy  21 1918

## 2021-01-19 ENCOUNTER — APPOINTMENT (OUTPATIENT)
Dept: ULTRASOUND IMAGING | Age: 26
End: 2021-01-19
Payer: MEDICARE

## 2021-01-19 VITALS
DIASTOLIC BLOOD PRESSURE: 88 MMHG | SYSTOLIC BLOOD PRESSURE: 115 MMHG | RESPIRATION RATE: 16 BRPM | TEMPERATURE: 98.6 F | HEART RATE: 78 BPM | OXYGEN SATURATION: 97 %

## 2021-01-19 LAB
ABSOLUTE EOS #: <0.03 K/UL (ref 0–0.44)
ABSOLUTE IMMATURE GRANULOCYTE: 0.05 K/UL (ref 0–0.3)
ABSOLUTE LYMPH #: 1.21 K/UL (ref 1.1–3.7)
ABSOLUTE MONO #: 0.55 K/UL (ref 0.1–1.2)
BASOPHILS # BLD: 0 % (ref 0–2)
BASOPHILS ABSOLUTE: <0.03 K/UL (ref 0–0.2)
DIFFERENTIAL TYPE: ABNORMAL
EOSINOPHILS RELATIVE PERCENT: 0 % (ref 1–4)
HCT VFR BLD CALC: 35.1 % (ref 36.3–47.1)
HEMOGLOBIN: 12 G/DL (ref 11.9–15.1)
IMMATURE GRANULOCYTES: 1 %
LYMPHOCYTES # BLD: 12 % (ref 24–43)
MCH RBC QN AUTO: 30.7 PG (ref 25.2–33.5)
MCHC RBC AUTO-ENTMCNC: 34.2 G/DL (ref 28.4–34.8)
MCV RBC AUTO: 89.8 FL (ref 82.6–102.9)
MONOCYTES # BLD: 5 % (ref 3–12)
NRBC AUTOMATED: 0 PER 100 WBC
PDW BLD-RTO: 12.1 % (ref 11.8–14.4)
PLATELET # BLD: 262 K/UL (ref 138–453)
PLATELET ESTIMATE: ABNORMAL
PMV BLD AUTO: 9.7 FL (ref 8.1–13.5)
RBC # BLD: 3.91 M/UL (ref 3.95–5.11)
RBC # BLD: ABNORMAL 10*6/UL
SEG NEUTROPHILS: 82 % (ref 36–65)
SEGMENTED NEUTROPHILS ABSOLUTE COUNT: 8.33 K/UL (ref 1.5–8.1)
WBC # BLD: 10.2 K/UL (ref 3.5–11.3)
WBC # BLD: ABNORMAL 10*3/UL

## 2021-01-19 PROCEDURE — 36415 COLL VENOUS BLD VENIPUNCTURE: CPT

## 2021-01-19 PROCEDURE — 99217 PR OBSERVATION CARE DISCHARGE MANAGEMENT: CPT | Performed by: ADVANCED PRACTICE MIDWIFE

## 2021-01-19 PROCEDURE — G0378 HOSPITAL OBSERVATION PER HR: HCPCS

## 2021-01-19 PROCEDURE — 2580000003 HC RX 258: Performed by: ADVANCED PRACTICE MIDWIFE

## 2021-01-19 PROCEDURE — 96376 TX/PRO/DX INJ SAME DRUG ADON: CPT

## 2021-01-19 PROCEDURE — 85025 COMPLETE CBC W/AUTO DIFF WBC: CPT

## 2021-01-19 PROCEDURE — 6370000000 HC RX 637 (ALT 250 FOR IP): Performed by: ADVANCED PRACTICE MIDWIFE

## 2021-01-19 PROCEDURE — 76815 OB US LIMITED FETUS(S): CPT

## 2021-01-19 PROCEDURE — 6360000002 HC RX W HCPCS: Performed by: ADVANCED PRACTICE MIDWIFE

## 2021-01-19 RX ADMIN — SODIUM CHLORIDE, POTASSIUM CHLORIDE, SODIUM LACTATE AND CALCIUM CHLORIDE: 600; 310; 30; 20 INJECTION, SOLUTION INTRAVENOUS at 01:54

## 2021-01-19 RX ADMIN — ACETAMINOPHEN 1000 MG: 500 TABLET, FILM COATED ORAL at 07:59

## 2021-01-19 RX ADMIN — SODIUM CHLORIDE 1.5 G: 9 INJECTION, SOLUTION INTRAVENOUS at 05:05

## 2021-01-19 ASSESSMENT — PAIN DESCRIPTION - DESCRIPTORS: DESCRIPTORS: ACHING

## 2021-01-19 ASSESSMENT — PAIN SCALES - GENERAL: PAINLEVEL_OUTOF10: 3

## 2021-01-19 NOTE — PROGRESS NOTES
Patient arrived to the department around [address] [place] [address]. Patient was laying flat on and moved to bed easily. At this point patient states she is having constant right lower quadrant pain with some pressure. Patient states this did start last evening around 5:00pm with occasional pains here and there and then today about 2:00pm it was a constant sharp pain. Which has not let up since. I did review with patient what we saw on ultrasound and radiology I also reviewed with her her lab work. And the plan Dr. Silviano Oquendo and I have. I did do a vaginal exam which patient was very tender to touch however vaginal vault was closed cervix was firm and no bulging membranes were noted. Due to this finding I did call Dr. Silviano Oquendo and talk with her about possible other options not ruling out miscarriage however wanting to make sure we did not miss anything else. At this point I did call radiology and consult the [facility] radiology center at 2020. We were wanting to look at the appendix based stated that ultrasound was primary route when pregnant that if we had a tech that was willing to do that it would be first-line second line was MRI imaging third line with CT however there would be a lot of radiation to the fetus. We did talk with Sena Hanson in radiology who called the tech Johan Randy states that she will attempt to do the ultrasound and look at the appendix on this patient. We are also planning on ordering the UA and repeating the CBC in about 4 hours we are also planning renal ultrasound of the kidneys and looking for jets making sure there is no obstruction. At 2035 I did going and review plan of care with patient. At this point in time she was getting additional pain medication to help with her significant pain is patient is writhing in bed. At 2037 I did call Dr. Silviano Oquendo back review that we were ordering the ultrasound for both the renal complete and the appendix and that patient was currently getting the pain medications.

## 2021-01-19 NOTE — CONSULTS
UROLOGY CONSULT    Patient:  Carmen Llanes  MRN: [id number]    Chief Complaint:   The patient is a 31 y.o. female who presents with right sided abdominal/flank pain. HISTORY OF PRESENT ILLNESS:   Patient presented to the emergency room on 1/18/2021 with acute onset right lower quadrant pain. Patient is 14 weeks pregnant with her second child. Patient is on prenatal vitamins. She does have a history of recurrent abdominal pain. Patient is known to our practice. She underwent 11/14/2018 right HLL for 6mm proximal right ureteral stone. She was lost to follow-up. The emergency room patient initially had a white blood cell count of 10.2. This did go up to 16 and today is now down to 10.2. Patient's kidney function was normal.  Patient did have a urinalysis which did show 20-50 RBCs per high-powered field. She did have a renal ultrasound that did not show any hydronephrosis. She had good ureteral jets she does have small bilateral renal calculi. She denies any fever, chills, gross hematuria. She has not been having a daily bowel movement. She admits to not drinking enough water. She was given a dose of Unasyn. She has a urine culture pending. She was given IV fluids and at time of visit she is comfortable. Patient is deaf and there was a teleinterpreter present for entire consultation.     Past Medical History:    Past Medical History:   Diagnosis Date    Deafness     Ganglion cyst of wrist 2010    left    Hereditary nephropathy (Alport's) as achild     had renal biopsy    Irritable bowel syndrome     Kidney disease     Microscopic hematuria     Obesity     Proteinuria     Sensorineural hearing loss     Thin basement membrane disease     Bx proven       Past Surgical History:    Past Surgical History:   Procedure Laterality Date    COLONOSCOPY  2009    Dr. Mathias Basket Left 8/18/2015    CYSTOSCOPY  11/14/2018    with stent placement    KIDNEY BIOPSY  12/29/98    LAPAROSCOPY  [place] CYSTO/URETERO W/LITHOTRIPSY &INDWELL STENT INSRT Right 2018    CYSTOSCOPY URETEROSCOPY LASER, HLL performed by Junior Tomlinson MD at [facility] &INDWELL STENT INSRT Right 2018    CYSTOSCOPY STENT INSERTION performed by Junior Tomlinson MD at [address] [place] [address] URETEROSCOPY  2015    WISDOM TOOTH EXTRACTION      WRIST SURGERY      cyst removal        Medications:    Scheduled Meds:   ampicillin-sulbactam  1.5 g Intravenous Q6H     Continuous Infusions:   lactated ringers 150 mL/hr at 21 0154     PRN Meds:.meperidine, promethazine, acetaminophen, zolpidem    Allergies:    Patient has no known allergies.     Social History:    Social History     Socioeconomic History    Marital status: Single     Spouse name: Not on file    Number of children: Not on file    Years of education: Not on file    Highest education level: Not on file   Occupational History    Not on file   Social Needs    Financial resource strain: Not on file    Food insecurity     Worry: Not on file     Inability: Not on file    Transportation needs     Medical: Not on file     Non-medical: Not on file   Tobacco Use    Smoking status: Former Smoker     Types: Cigarettes     Quit date: 3/18/2014     Years since quittin.8    Smokeless tobacco: Never Used    Tobacco comment: several cigarettes daily   Substance and Sexual Activity    Alcohol use: No     Alcohol/week: 0.0 standard drinks    Drug use: No    Sexual activity: Yes     Partners: Male   Lifestyle    Physical activity     Days per week: Not on file     Minutes per session: Not on file    Stress: Not on file   Relationships    Social connections     Talks on phone: Not on file     Gets together: Not on file     Attends [employer] service: Not on file     Active member of club or organization: Not on file     Attends meetings of clubs or organizations: Not on file     Relationship status: Not on file    Intimate partner violence     Fear of current or ex partner: Not on file     Emotionally abused: Not on file     Physically abused: Not on file     Forced sexual activity: Not on file   Other Topics Concern    Not on file   Social History Narrative    Not on file       Family History:    Family History   Problem Relation Age of Onset    Cancer Paternal Grandmother         bone    High Cholesterol Mother     Cancer Mother         breast    High Blood Pressure Mother     Migraines Mother     Alcohol Abuse Father     Anxiety Disorder Father     Depression Father     Hearing Loss Brother     Hearing Loss Brother        REVIEW OF SYSTEMS:  Constitutional: Negative for fever, chills and unexpected weight change. Respiratory: Negative for shortness of breath and wheezing. Cardiovascular: Negative for chest pain and palpitations. Gastrointestinal: Positive for right lower quadrant pain  Endocrine: Negative for polydipsia and polyuria. Genitourinary: Negative for hematuria, dysuria  Musculoskeletal: Negative for myalgias and joint swelling. Skin: Negative for rash and wound. Neurological: Negative for dizziness and headaches. Hematological: Negative for adenopathy. Does not bruise/bleed easily. PHYSICAL EXAM:  Patient Vitals for the past 24 hrs:   BP Temp Temp src Pulse Resp SpO2   01/19/21 0749 115/88 98.6 °F (37 °C) Oral 78 16 --   01/19/21 0423 103/60 -- -- 96 -- --   01/19/21 0422 -- 98.6 °F (37 °C) Oral -- 16 --   01/18/21 2344 (!) 104/52 -- -- 50 -- --   01/18/21 2340 -- 98.6 °F (37 °C) Oral -- 16 --   01/18/21 2122 -- -- -- -- -- 97 %   01/18/21 2117 -- -- -- -- -- 99 %   01/18/21 2116 -- 98.3 °F (36.8 °C) Oral -- 18 --   01/18/21 2012 123/83 -- -- 81 -- --   01/18/21 1911 139/84 -- -- 87 20 98 %   01/18/21 1655 -- 98.3 °F (36.8 °C) Oral -- -- --   01/18/21 1641 (!) 199/180 -- -- 110 20 98 %     Constitutional: Patient resting comfortably, in no acute distress.    Neuro: Alert and oriented to person place and time. Deaf  Psych: Mood and affect normal.  HEENT: normocephalic, atraumatic  Lungs: Respiratory effort normal, unlabored  Cardiovascular:  Normal peripheral pulses  Abdomen: Pregnant. soft, non-tender, non-distended  : No CVA tenderness Bladder non-tender and not distended. Pelvic: Deferred      LABS  Recent Labs     21  1640 21  2100 21  0508   WBC 10.2 16.0* 10.2   HGB 14.4 12.9 12.0   HCT 41.4 37.6 35.1*   MCV 88.3 89.1 89.8    284 262     Recent Labs     21  1640   *   K 3.7   CL 99   CO2 21   BUN 10   CREATININE 0.67     No results found for: PSA    Urinalysis:   Recent Labs     21   COLORU YELLOW   PHUR 5.5   WBCUA 2 TO 5   RBCUA 20 TO 50   MUCUS NOT REPORTED   TRICHOMONAS NOT REPORTED   YEAST NOT REPORTED   BACTERIA TRACE*   SPECGRAV >1.030*   LEUKOCYTESUR NEGATIVE   UROBILINOGEN Normal   BILIRUBINUR NEGATIVE        Additional Lab/culture results:  none    Imaging Results:  none    ASSESSMENT AND PLAN:  Impression:    Patient Active Problem List   Diagnosis    Thin basement membrane disease    Rh negative status during pregnancy    Deaf, nonspeaking    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     Follow-up urine culture    Increase water intake    Colace for constipation    Patient instructed to drink at least 80 ounces of \"good fluids\" daily (water, juice, Gatoraide, milk) and to avoid/minimize intake of \"bad fluids\" (soda pop, coffee, tea, alcohol, energy drinks). Also advised to avoid excessive consumption of sodium and animal protein to decrease risk of recurrent kidney stones. Unless she develops worsening renal colic we will follow-up at a routine visit at least 2 months after she delivers with a KUB. We have made an appointment for her in early October. She is okay from urology standpoint to be discharged home. If she develops any worsening right flank pain or she thinks she is passing a stone we be happy to see her in the office sooner. We appreciate being consulted on this patient.    ------------------------------------------------  Thank you for the consultation.   I have discussed the care of this patient including pertinent history and exam findings, lab and imaging results, assessment, orders and plan as documented above with Kary Long MD.    Electronically signed by Peter Basurto PA-C on 1/19/2021 at 11:17 AM

## 2021-01-19 NOTE — H&P
Department of Obstetrics and Gynecology   Obstetrics History and Physical  H&P Admission Inpatient  Note        CHIEF COMPLAINT:    Chief Complaint   Patient presents with    Abdominal Pain     RLQ abdominal pain. onset last night. ..worse today. 14 weeks pregnant       HISTORY OF PRESENT ILLNESS:      The patient is a 31 y.o. female at 14w5d.   OB History        2    Para   1    Term   1            AB        Living   1       SAB        TAB        Ectopic        Molar        Multiple        Live Births   1            Patient presents with a chief complaint as above and is being admitted for observation    Estimated Due Date: Estimated Date of Delivery: 21    PRENATAL CARE:    Complicated by: LLQ pain    PAST OB HISTORY:  OB History        2    Para   1    Term   1            AB        Living   1       SAB        TAB        Ectopic        Molar        Multiple        Live Births   1                Past Medical History:        Diagnosis Date    Deafness     Ganglion cyst of wrist     left    Hereditary nephropathy (Alport's) as achild     had renal biopsy    Irritable bowel syndrome     Kidney disease     Microscopic hematuria     Obesity     Proteinuria     Sensorineural hearing loss     Thin basement membrane disease     Bx proven     Past Surgical History:        Procedure Laterality Date    COLONOSCOPY      Dr. Brian Palacios Left 2015    CYSTOSCOPY  2018    with stent placement    KIDNEY BIOPSY  98    LAPAROSCOPY      NE CYSTO/URETERO W/LITHOTRIPSY &[facility] STENT INSRT Right 2018    CYSTOSCOPY URETEROSCOPY LASER, HLL performed by Greg Garibay MD at [facility] &[facility] STENT INSRT Right 2018    CYSTOSCOPY STENT INSERTION performed by Greg Garibay MD at [address] [place] [address] URETEROSCOPY  2015    WISDOM TOOTH EXTRACTION      WRIST SURGERY      cyst removal     Allergies:  Patient has no known allergies.     Social History:    Social History     Socioeconomic History    Marital status: Single     Spouse name: Not on file    Number of children: Not on file    Years of education: Not on file    Highest education level: Not on file   Occupational History    Not on file   Social Needs    Financial resource strain: Not on file    Food insecurity     Worry: Not on file     Inability: Not on file    Transportation needs     Medical: Not on file     Non-medical: Not on file   Tobacco Use    Smoking status: Former Smoker     Types: Cigarettes     Quit date: 3/18/2014     Years since quittin.8    Smokeless tobacco: Never Used    Tobacco comment: several cigarettes daily   Substance and Sexual Activity    Alcohol use: No     Alcohol/week: 0.0 standard drinks    Drug use: No    Sexual activity: Yes     Partners: Male   Lifestyle    Physical activity     Days per week: Not on file     Minutes per session: Not on file    Stress: Not on file   Relationships    Social connections     Talks on phone: Not on file     Gets together: Not on file     Attends Jewish service: Not on file     Active member of club or organization: Not on file     Attends meetings of clubs or organizations: Not on file     Relationship status: Not on file    Intimate partner violence     Fear of current or ex partner: Not on file     Emotionally abused: Not on file     Physically abused: Not on file     Forced sexual activity: Not on file   Other Topics Concern    Not on file   Social History Narrative    Not on file     Family History:       Problem Relation Age of Onset    Cancer Paternal Grandmother         bone    High Cholesterol Mother     Cancer Mother         breast    High Blood Pressure Mother     Migraines Mother     Alcohol Abuse Father     Anxiety Disorder Father     Depression Father     Hearing Loss Brother     Hearing Loss Brother      Medications Prior to Admission:  Medications Pallidium, IGG:    T. pallidum, IgG   Date Value Ref Range Status   11/09/2020 NONREACTIVE NONREACTIVE Final     Comment:           T. pallidum antibodies are not detected. There is no serological evidence of infection with T. pallidum (early primary syphilis   cannot be excluded). Retest in 2-4 weeks if syphilis is clinically suspect.              Sickle Cell Screen: No results found for: SICKLE  Hepatitis B Surface Antigen:   Hepatitis B Surface Ag   Date Value Ref Range Status   11/09/2020 NONREACTIVE NONREACTIVE Final     HIV:    Lab Results   Component Value Date    [id number] NONREACTIVE 04/24/2014              Results for orders placed or performed during the hospital encounter of 01/18/21   CBC Auto Differential   Result Value Ref Range    WBC 10.2 3.5 - 11.3 k/uL    RBC 4.69 3.95 - 5.11 m/uL    Hemoglobin 14.4 11.9 - 15.1 g/dL    Hematocrit 41.4 36.3 - 47.1 %    MCV 88.3 82.6 - 102.9 fL    MCH 30.7 25.2 - 33.5 pg    MCHC 34.8 28.4 - 34.8 g/dL    RDW 12.0 11.8 - 14.4 %    Platelets 921 382 - 837 k/uL    MPV 9.7 8.1 - 13.5 fL    NRBC Automated 0.0 0.0 per 100 WBC    Differential Type NOT REPORTED     Seg Neutrophils 72 (H) 36 - 65 %    Lymphocytes 19 (L) 24 - 43 %    Monocytes 7 3 - 12 %    Eosinophils % 1 1 - 4 %    Basophils 0 0 - 2 %    Immature Granulocytes 1 (H) 0 %    Segs Absolute 7.41 1.50 - 8.10 k/uL    Absolute Lymph # 1.97 1.10 - 3.70 k/uL    Absolute Mono # 0.69 0.10 - 1.20 k/uL    Absolute Eos # 0.09 0.00 - 0.44 k/uL    Basophils Absolute 0.03 0.00 - 0.20 k/uL    Absolute Immature Granulocyte 0.05 0.00 - 0.30 k/uL    WBC Morphology NOT REPORTED     RBC Morphology NOT REPORTED     Platelet Estimate NOT REPORTED    Comprehensive Metabolic Panel   Result Value Ref Range    Glucose 100 (H) 70 - 99 mg/dL    BUN 10 6 - 20 mg/dL    CREATININE 0.67 0.50 - 0.90 mg/dL    Bun/Cre Ratio 15 9 - 20    Calcium 9.9 8.6 - 10.4 mg/dL    Sodium 132 (L) 135 - 144 mmol/L    Potassium 3.7 3.7 - 5.3 mmol/L    Chloride 0.0 0.0 per 100 WBC    Differential Type NOT REPORTED     WBC Morphology NOT REPORTED     RBC Morphology NOT REPORTED     Platelet Estimate NOT REPORTED     Seg Neutrophils 90 (H) 36 - 65 %    Lymphocytes 5 (L) 24 - 43 %    Monocytes 4 3 - 12 %    Eosinophils % 0 (L) 1 - 4 %    Basophils 0 0 - 2 %    Immature Granulocytes 1 (H) 0 %    Segs Absolute 14.36 (H) 1.50 - 8.10 k/uL    Absolute Lymph # 0.85 (L) 1.10 - 3.70 k/uL    Absolute Mono # 0.62 0.10 - 1.20 k/uL    Absolute Eos # <0.03 0.00 - 0.44 k/uL    Basophils Absolute 0.03 0.00 - 0.20 k/uL    Absolute Immature Granulocyte 0.08 0.00 - 0.30 k/uL   RHOGAM INJECTION ONLY   Result Value Ref Range    Unit Number [id number]     Product Code RHIG     Unit Divison 00     Dispense Status ALLOCATED     Transfusion Status OK TO TRANSFUSE    TYPE AND SCREEN   Result Value Ref Range    Expiration Date 2021,2359     Arm Band Number [id number]     ABO/Rh A NEGATIVE     Antibody Screen NEGATIVE      Please see u/s reports    ASSESSMENT AND PLAN:    Estimated length of stay: less 23 hours    Active Problems:    14 weeks gestation of pregnancy      Continuous RLQ abdominal pain      Kidney stone on right side      ER dx - Spontaneous  - however pt currently has a viable fetus with a closed cervix on SVE      Pain management  Repeat u/s in am  Repeat limited fetal in am  Antibiotic therapy    Reviewed plan with Dr. Claudette Barber 10:32 PM

## 2021-01-19 NOTE — PROGRESS NOTES
Department of Obstetrics and Gynecology  Labor and Delivery  Triage Note      SUBJECTIVE:  Pt states she is feeling much better today she is using tylenol for pain. OBJECTIVE    HISTORY OF PRESENT ILLNESS:      The patient is a 31 y.o. female at 17w8d. OB History        2    Para   1    Term   1            AB        Living   1       SAB        TAB        Ectopic        Molar        Multiple        Live Births   1            Patient presents with a chief complaint RLQ pain    Estimated Due Date: Estimated Date of Delivery: 21    Past Medical History:   Diagnosis Date    Deafness     Ganglion cyst of wrist     left    Hereditary nephropathy (Alport's) as achild     had renal biopsy    Irritable bowel syndrome     Kidney disease     Microscopic hematuria     Obesity     Proteinuria     Sensorineural hearing loss     Thin basement membrane disease     Bx proven       Vitals:  /88   Pulse 78   Temp 98.6 °F (37 °C) (Oral)   Resp 16   LMP 2020 (Exact Date)   SpO2 97%     CONSTITUTIONAL:  awake, alert, cooperative, no apparent distress, and appears stated age  ABDOMEN:  No scars, normal bowel sounds, soft, non-distended, non-tender, no masses palpated, no hepatosplenomegally  GENITAL/URINARY:  Uterus:  Size normal, Contour normal  NEUROLOGIC:  Awake, alert, oriented to name, place and time. Cranial nerves II-XII are grossly intact. Motor is 5 out of 5 bilaterally. Cerebellar finger to nose, heel to shin intact. Sensory is intact.   Babinski down going, Romberg negative, and gait is normal.    ANITA ROLLE 2021  8:05 [phone]    Radiation Dose Estimates    No radiation information found for this patient   Findings    No data filed   Narrative   EXAMINATION:   LIMITED OB ULTRASOUND       2021       TECHNIQUE:   Transabdominal second/third trimester obstetric pelvic ultrasound was   performed.       COMPARISON:   2021       HISTORY: ORDERING SYSTEM PROVIDED HISTORY: cervical length and heart rate   TECHNOLOGIST PROVIDED HISTORY:   cervical length and heart rate       FINDINGS:   A single live intrauterine pregnancy is present. Fetal heart rate measures   164 beats per minute. There is normal fetal body and limb movement. The   placenta is located anterior.       The amniotic fluid volume is qualitatively adequate.       Cervix is closed with length measuring 3.7 cm.           Impression   Single live intrauterine pregnancy with closed cervix of appropriate length   and qualitatively adequate amniotic fluid volume             CBC auto differential [[id number]] (Abnormal)    Collected: 01/19/21 0508    Updated: 01/19/21 0522    Specimen Source: Blood     WBC 10.2 k/uL    RBC 3.91Low  m/uL    Hemoglobin 12.0 g/dL    Hematocrit 35.1Low  %    MCV 89.8 fL    MCH 30.7 pg    MCHC 34.2 g/dL    RDW 12.1 %    Platelets 658 k/uL    MPV 9.7 fL    NRBC Automated 0.0 per 100 WBC    Differential Type NOT REPORTED    WBC Morphology NOT REPORTED    RBC Morphology NOT REPORTED    Platelet Estimate NOT REPORTED    Seg Neutrophils 82High  %    Lymphocytes 12Low  %    Monocytes 5 %    Eosinophils % 0Low  %    Basophils 0 %    Immature Granulocytes 1High  %    Segs Absolute 8. 33High  k/uL    Absolute Lymph # 1.21 k/uL    Absolute Mono # 0.55 k/uL    Absolute Eos # <0.03 k/uL    Basophils Absolute <0.03 k/uL    Absolute Immature Granulocyte 0.05 k/uL   US RETROPERITONEAL COMPLETE [[id number]]    Collected: 01/18/21 2234    Updated: 01/18/21 2242    Narrative:     EXAMINATION:   RETROPERITONEAL ULTRASOUND OF THE KIDNEYS AND URINARY BLADDER     1/18/2021     COMPARISON:   CT abdomen pelvis from 07/24/2019     HISTORY:   ORDERING SYSTEM PROVIDED HISTORY: RLQ pain severe   TECHNOLOGIST PROVIDED HISTORY:   RLQ pain severe     34-year-old female with severe right lower quadrant abdominal pain     FINDINGS:     Kidneys:     Right kidney measures 12.8 x 6.6 x 5.5 cm.  Right renal cortical thickness   measures 1.7 cm. Left kidney measures 13.0 x 5.3 x 5.3 cm.  Left renal cortical thickness   measures 2.4 cm. Bilateral renal calculi.  Largest calculus measures 6 mm at the lower pole   right kidney on image 36. Prominence of the right renal pelvis.  No overt hydronephrosis or perinephric   fluid. Gross preservation of the bilateral corticomedullary differentiation. Bladder:     Urinary bladder measures 3.9 x 5.3 x 7.3 cm for a bladder volume of 105.2 mL. Urinary bladder wall thickness measures 3 mm.  Bilateral ureteral jets are   visualized. Postvoid urinary bladder measures 1.6 x 2.5 x 3.9 cm for a postvoid urinary   bladder volume of 8 mL.  Minimal postvoid residual.    Impression:     1. Bilateral renal calculi with largest calculus measuring 6 mm at the lower   pole right kidney. 2. Prominence of the right renal pelvis.  No overt hydronephrosis. 3. Minimal postvoid residual.  Bilateral ureteral jets are visualized. [address] [[id number]]    Collected: 01/18/21 2233    Updated: 01/18/21 2239    Narrative:     EXAMINATION:   RIGHT LOWER QUADRANT ULTRASOUND     1/18/2021     TECHNIQUE:   Targeted sonographic imaging of the right lower quadrant. COMPARISON:   CT abdomen pelvis from 07/24/2019     HISTORY:   ORDERING SYSTEM PROVIDED HISTORY: RLQ pain   TECHNOLOGIST PROVIDED HISTORY:   RLQ pain     50-year-old female with right lower quadrant abdominal pain     FINDINGS:   Nonvisualization of the appendix in the right lower quadrant by sonography. Impression:     Nonvisualization of the appendix.             Contraction frequency: 0 minutes    DATA:  Results for orders placed or performed during the hospital encounter of 01/18/21   CBC Auto Differential   Result Value Ref Range    WBC 10.2 3.5 - 11.3 k/uL    RBC 4.69 3.95 - 5.11 m/uL    Hemoglobin 14.4 11.9 - 15.1 g/dL    Hematocrit 41.4 36.3 - 47.1 %    MCV 88.3 82.6 - 102.9 fL    MCH 30.7 25.2 - 33.5 pg    MCHC 34.8 28.4 - 34.8 g/dL    RDW 12.0 11.8 - 14.4 %    Platelets 043 039 - 440 k/uL    MPV 9.7 8.1 - 13.5 fL    NRBC Automated 0.0 0.0 per 100 WBC    Differential Type NOT REPORTED     Seg Neutrophils 72 (H) 36 - 65 %    Lymphocytes 19 (L) 24 - 43 %    Monocytes 7 3 - 12 %    Eosinophils % 1 1 - 4 %    Basophils 0 0 - 2 %    Immature Granulocytes 1 (H) 0 %    Segs Absolute 7.41 1.50 - 8.10 k/uL    Absolute Lymph # 1.97 1.10 - 3.70 k/uL    Absolute Mono # 0.69 0.10 - 1.20 k/uL    Absolute Eos # 0.09 0.00 - 0.44 k/uL    Basophils Absolute 0.03 0.00 - 0.20 k/uL    Absolute Immature Granulocyte 0.05 0.00 - 0.30 k/uL    WBC Morphology NOT REPORTED     RBC Morphology NOT REPORTED     Platelet Estimate NOT REPORTED    Comprehensive Metabolic Panel   Result Value Ref Range    Glucose 100 (H) 70 - 99 mg/dL    BUN 10 6 - 20 mg/dL    CREATININE 0.67 0.50 - 0.90 mg/dL    Bun/Cre Ratio 15 9 - 20    Calcium 9.9 8.6 - 10.4 mg/dL    Sodium 132 (L) 135 - 144 mmol/L    Potassium 3.7 3.7 - 5.3 mmol/L    Chloride 99 98 - 107 mmol/L    CO2 21 20 - 31 mmol/L    Anion Gap 12 9 - 17 mmol/L    Alkaline Phosphatase 101 35 - 104 U/L    ALT 30 5 - 33 U/L    AST 22 <32 U/L    Total Bilirubin 0.31 0.3 - 1.2 mg/dL    Total Protein 7.5 6.4 - 8.3 g/dL    Alb 4.2 3.5 - 5.2 g/dL    Albumin/Globulin Ratio 1.3 1.0 - 2.5    GFR Non-African American >60 >60 mL/min    GFR African American >60 >60 mL/min    GFR Comment          GFR Staging         Lipase   Result Value Ref Range    Lipase 21 13 - 60 U/L   Urinalysis with Microscopic   Result Value Ref Range    Color, UA YELLOW YELLOW    Turbidity UA CLEAR CLEAR    Glucose, Ur NEGATIVE NEGATIVE    Bilirubin Urine NEGATIVE NEGATIVE    Ketones, Urine 2+ (A) NEGATIVE    Specific Gravity, UA >1.030 (H) 1.010 - 1.020    Urine Hgb 3+ (A) NEGATIVE    pH, UA 5.5 5.0 - 9.0    Protein, UA 2+ (A) NEGATIVE    Urobilinogen, Urine Normal Normal    Nitrite, Urine NEGATIVE NEGATIVE    Leukocyte Esterase, Urine NEGATIVE NEGATIVE    Urinalysis Comments NOT REPORTED     -          WBC, UA 2 TO 5 0 - 5 /HPF    RBC, UA 20 TO 50 0 - 2 /HPF    Casts UA NOT REPORTED /LPF    Crystals, UA NOT REPORTED None /HPF    Epithelial Cells UA 0 TO 2 0 - 25 /HPF    Renal Epithelial, UA NOT REPORTED 0 /HPF    Bacteria, UA TRACE (A) None    Mucus, UA NOT REPORTED None    Trichomonas, UA NOT REPORTED None    Amorphous, UA NOT REPORTED None    Other Observations UA NOT REPORTED NOT REQ.     Yeast, UA NOT REPORTED None   Lactic Acid   Result Value Ref Range    Lactic Acid 1.0 0.5 - 2.2 mmol/L   HCG, Quantitative, Pregnancy   Result Value Ref Range    hCG Quant 14,005 (H) <5 IU/L   CBC Auto Differential   Result Value Ref Range    WBC 16.0 (H) 3.5 - 11.3 k/uL    RBC 4.22 3.95 - 5.11 m/uL    Hemoglobin 12.9 11.9 - 15.1 g/dL    Hematocrit 37.6 36.3 - 47.1 %    MCV 89.1 82.6 - 102.9 fL    MCH 30.6 25.2 - 33.5 pg    MCHC 34.3 28.4 - 34.8 g/dL    RDW 11.9 11.8 - 14.4 %    Platelets 158 470 - 929 k/uL    MPV 9.7 8.1 - 13.5 fL    NRBC Automated 0.0 0.0 per 100 WBC    Differential Type NOT REPORTED     WBC Morphology NOT REPORTED     RBC Morphology NOT REPORTED     Platelet Estimate NOT REPORTED     Seg Neutrophils 90 (H) 36 - 65 %    Lymphocytes 5 (L) 24 - 43 %    Monocytes 4 3 - 12 %    Eosinophils % 0 (L) 1 - 4 %    Basophils 0 0 - 2 %    Immature Granulocytes 1 (H) 0 %    Segs Absolute 14.36 (H) 1.50 - 8.10 k/uL    Absolute Lymph # 0.85 (L) 1.10 - 3.70 k/uL    Absolute Mono # 0.62 0.10 - 1.20 k/uL    Absolute Eos # <0.03 0.00 - 0.44 k/uL    Basophils Absolute 0.03 0.00 - 0.20 k/uL    Absolute Immature Granulocyte 0.08 0.00 - 0.30 k/uL   CBC auto differential   Result Value Ref Range    WBC 10.2 3.5 - 11.3 k/uL    RBC 3.91 (L) 3.95 - 5.11 m/uL    Hemoglobin 12.0 11.9 - 15.1 g/dL    Hematocrit 35.1 (L) 36.3 - 47.1 %    MCV 89.8 82.6 - 102.9 fL    MCH 30.7 25.2 - 33.5 pg    MCHC 34.2 28.4 - 34.8 g/dL    RDW 12.1 11.8 - 14.4 %    Platelets 021 160 - 453 k/uL    MPV 9.7 8.1 - 13.5 fL    NRBC Automated 0.0 0.0 per 100 WBC    Differential Type NOT REPORTED     WBC Morphology NOT REPORTED     RBC Morphology NOT REPORTED     Platelet Estimate NOT REPORTED     Seg Neutrophils 82 (H) 36 - 65 %    Lymphocytes 12 (L) 24 - 43 %    Monocytes 5 3 - 12 %    Eosinophils % 0 (L) 1 - 4 %    Basophils 0 0 - 2 %    Immature Granulocytes 1 (H) 0 %    Segs Absolute 8.33 (H) 1.50 - 8.10 k/uL    Absolute Lymph # 1.21 1.10 - 3.70 k/uL    Absolute Mono # 0.55 0.10 - 1.20 k/uL    Absolute Eos # <0.03 0.00 - 0.44 k/uL    Basophils Absolute <0.03 0.00 - 0.20 k/uL    Absolute Immature Granulocyte 0.05 0.00 - 0.30 k/uL   RHOGAM INJECTION ONLY   Result Value Ref Range    Unit Number [id number]     Product Code RHIG     Unit Divison 00     Dispense Status ALLOCATED     Transfusion Status OK TO TRANSFUSE    TYPE AND SCREEN   Result Value Ref Range    Expiration Date 01/21/2021,2284     Arm Band Number [id number]     ABO/Rh A NEGATIVE     Antibody Screen NEGATIVE        ASSESSMENT :      Active Problems:    14 weeks gestation of pregnancy      Continuous RLQ abdominal pain      Kidney stone on right side           PLAN:    Urology consult   Pt cleared OB  Waiting on urology

## 2021-01-19 NOTE — DISCHARGE SUMMARY
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 Do not engage in any activities which may cause harm or injury to the abdomen.         [facility] Medication Instructions WL[facility]:[id number]    Printed on:21 [id number]   Medication Information                      acetaminophen (TYLENOL) 500 MG tablet  Take 500 mg by mouth every 6 hours as needed for Pain             dicyclomine (BENTYL) 10 MG capsule  Take 1 capsule by mouth 4 times daily (before meals and nightly)             hyoscyamine (LEVSIN/SL) 125 MCG sublingual tablet  Place 1 tablet under the tongue every 6 hours as needed (abdominal pain)             ketorolac (TORADOL) 10 MG tablet  Take 1 tablet by mouth every 6 hours as needed for Pain             naproxen (NAPROSYN) 500 MG tablet  Take 1 tablet by mouth 2 times daily as needed for Pain (/ Take with food and a full glass of water)             Prenatal Vit-Fe Fumarate-FA (PRENATAL VITAMIN PO)  Take by mouth             sertraline (ZOLOFT) 50 MG tablet  TAKE 1 TABLET BY MOUTH ONCE A DAY                  Discharged to: Home       Keep next appt

## 2021-01-20 LAB
CULTURE: NO GROWTH
Lab: NORMAL
SPECIMEN DESCRIPTION: NORMAL

## 2021-01-25 DIAGNOSIS — B37.31 VAGINA, CANDIDIASIS: Primary | ICD-10-CM

## 2021-01-26 ENCOUNTER — ROUTINE PRENATAL (OUTPATIENT)
Dept: OBGYN | Age: 26
End: 2021-01-26
Payer: MEDICARE

## 2021-01-26 VITALS — WEIGHT: 232 LBS | DIASTOLIC BLOOD PRESSURE: 80 MMHG | BODY MASS INDEX: 37.45 KG/M2 | SYSTOLIC BLOOD PRESSURE: 130 MMHG

## 2021-01-26 DIAGNOSIS — N20.0 RENAL CALCULUS, RIGHT: ICD-10-CM

## 2021-01-26 DIAGNOSIS — Z3A.15 15 WEEKS GESTATION OF PREGNANCY: Primary | ICD-10-CM

## 2021-01-26 PROBLEM — Z3A.14 14 WEEKS GESTATION OF PREGNANCY: Status: RESOLVED | Noted: 2021-01-18 | Resolved: 2021-01-26

## 2021-01-26 PROCEDURE — G8417 CALC BMI ABV UP PARAM F/U: HCPCS | Performed by: ADVANCED PRACTICE MIDWIFE

## 2021-01-26 PROCEDURE — G8427 DOCREV CUR MEDS BY ELIG CLIN: HCPCS | Performed by: ADVANCED PRACTICE MIDWIFE

## 2021-01-26 PROCEDURE — 0502F SUBSEQUENT PRENATAL CARE: CPT | Performed by: ADVANCED PRACTICE MIDWIFE

## 2021-01-26 PROCEDURE — 1036F TOBACCO NON-USER: CPT | Performed by: ADVANCED PRACTICE MIDWIFE

## 2021-01-26 PROCEDURE — G8484 FLU IMMUNIZE NO ADMIN: HCPCS | Performed by: ADVANCED PRACTICE MIDWIFE

## 2021-01-26 NOTE — PROGRESS NOTES
Pushpa Cardoza is here at 15w6d for:    Chief Complaint   Patient presents with    Routine Prenatal Visit     pt still c/o RLQ pain       Estimated Due Date: Estimated Date of Delivery: 21    OB History    Para Term  AB Living   2 1 1     1   SAB TAB Ectopic Molar Multiple Live Births             1      # Outcome Date GA Lbr Poli/2nd Weight Sex Delivery Anes PTL Lv   2 Current            1 Term 14 40w0d  7 lb 6 oz (3.345 kg) F Vag-Spont  N NAYELI        Past Medical History:   Diagnosis Date    Deafness     Ganglion cyst of wrist     left    Hereditary nephropathy (Alport's) as achild     had renal biopsy    Irritable bowel syndrome     Kidney disease     Microscopic hematuria     Obesity     Proteinuria     Sensorineural hearing loss     Thin basement membrane disease     Bx proven       Past Surgical History:   Procedure Laterality Date    COLONOSCOPY      Dr. Jalen Pardo Left 2015    CYSTOSCOPY  2018    with stent placement    KIDNEY BIOPSY  98    LAPAROSCOPY      [place] CYSTO/URETERO W/LITHOTRIPSY &INDWELL STENT INSRT Right 2018    CYSTOSCOPY URETEROSCOPY LASER, HLL performed by Kary Long MD at [facility] &INDWELL STENT INSRT Right 2018    CYSTOSCOPY STENT INSERTION performed by Kary Long MD at [address] [place] [address] URETEROSCOPY  2015    WISDOM TOOTH EXTRACTION      WRIST SURGERY      cyst removal       Social History     Tobacco Use   Smoking Status Former Smoker    Types: Cigarettes    Quit date: 3/18/2014    Years since quittin.8   Smokeless Tobacco Never Used   Tobacco Comment    several cigarettes daily        Social History     Substance and Sexual Activity   Alcohol Use No    Alcohol/week: 0.0 standard drinks       No results found for this visit on 21.     Vitals:  /80   Wt 232 lb (105.2 kg)   LMP 2020 (Exact Date)   BMI 37.45 kg/m²   Estimated body mass index is 37.45 kg/m² as calculated from the following:    Height as of 9/15/20: 5' 6\" (1.676 m). Weight as of this encounter: 232 lb (105.2 kg). HPI: Patient here today for a follow-up OB visit and follow-up kidney stones. Patient states she is having on and off pain still and she is using Tylenol and was wondering if she can use a heat pack for it as well. I did review with her that this was fine. Patient states she is starting to feel little fetal movement as well.  did not show for this visit but we proceeded on anyway. Yes PT denies fever, chills, nausea and vomiting       Abdomen: enlarged, soft     Cervix:  not digitally examined    Results reviewed today:    hospital stay and all labs  See prenatal vital sign section and fetal assessment section    ASSESSMENT & Plan    Diagnosis Orders   1. 15 weeks gestation of pregnancy     2. Renal calculus, right               I am having Pretty Tenorio maintain her acetaminophen, sertraline, dicyclomine, naproxen, hyoscyamine, ketorolac, Prenatal Vit-Fe Fumarate-FA (PRENATAL VITAMIN PO), and terconazole. Return for Keep next appt. Patient Instructions       Patient Education        Weeks 14 to 25 of Your Pregnancy: Care Instructions  Your Care Instructions     During this time, you may start to \"show,\" so that you look pregnant to people around you. You may also notice some changes in your skin, such as itchy spots on your palms or acne on your face. Your baby is now able to pass urine, and your baby's first stool (meconium) is starting to collect in his or her intestines. Hair is also beginning to grow on your baby's head. At your next visit, between weeks 18 and 20, your doctor may do an ultrasound test. The test allows your doctor to check for certain problems. Your doctor can also tell the sex of your baby. This is a good time to think about whether you want to know whether your baby is a boy or a girl.   Talk to your doctor to use light weights. They reduce stress on your joints. Stay at the best weight for you    · Experts recommend that you gain about 1 pound a month during the first 3 months of your pregnancy.     · Experts recommend that you gain about 1 pound a week during your last 6 months of pregnancy, for a total weight gain of 25 to 35 pounds.     · If you are underweight, you will need to gain more weight (about 28 to 40 pounds).     · If you are overweight, you may not need to gain as much weight (about 15 to 25 pounds).     · If you are gaining weight too fast, use common sense. Exercise every day, and limit sweets, fast foods, and fats. Choose lean meats, fruits, and vegetables.     · If you are having twins or more, your doctor may refer you to a dietitian. Where can you learn more? Go to https://Zytoprotecpepiceweb.[employer]. org and sign in to your [employer] account. Enter X200 in the [employer] box to learn more about \"Weeks 14 to 18 of Your Pregnancy: Care Instructions. \"     If you do not have an account, please click on the \"Sign Up Now\" link. Current as of: February 11, 2020               Content Version: 12.6  © 1789-6790 [employer]. Care instructions adapted under license by [facility] ([facility]). If you have questions about a medical condition or this instruction, always ask your healthcare professional. [place] [address] any warranty or liability for your use of this information.                    Olga Lidia Dennison,1/26/2021 8:57 AM

## 2021-01-26 NOTE — PATIENT INSTRUCTIONS

## 2021-02-03 LAB
BLD PROD TYP BPU: NORMAL
DISPENSE STATUS BLOOD BANK: NORMAL
TRANSFUSION STATUS: NORMAL
UNIT DIVISION: 0
UNIT NUMBER: NORMAL

## 2021-02-09 ENCOUNTER — ANCILLARY PROCEDURE (OUTPATIENT)
Dept: OBGYN | Age: 26
End: 2021-02-09
Payer: MEDICARE

## 2021-02-09 ENCOUNTER — ROUTINE PRENATAL (OUTPATIENT)
Dept: OBGYN | Age: 26
End: 2021-02-09
Payer: MEDICARE

## 2021-02-09 VITALS — SYSTOLIC BLOOD PRESSURE: 136 MMHG | BODY MASS INDEX: 36.8 KG/M2 | WEIGHT: 228 LBS | DIASTOLIC BLOOD PRESSURE: 80 MMHG

## 2021-02-09 DIAGNOSIS — Z3A.17 17 WEEKS GESTATION OF PREGNANCY: Primary | ICD-10-CM

## 2021-02-09 DIAGNOSIS — O26.892 RH NEGATIVE STATUS DURING PREGNANCY IN SECOND TRIMESTER: ICD-10-CM

## 2021-02-09 DIAGNOSIS — Z36.3 ANTENATAL SCREENING FOR MALFORMATION USING ULTRASONICS: ICD-10-CM

## 2021-02-09 DIAGNOSIS — N20.0 RENAL CALCULUS, RIGHT: ICD-10-CM

## 2021-02-09 DIAGNOSIS — Z3A.17 17 WEEKS GESTATION OF PREGNANCY: ICD-10-CM

## 2021-02-09 DIAGNOSIS — Z67.91 RH NEGATIVE STATUS DURING PREGNANCY IN SECOND TRIMESTER: ICD-10-CM

## 2021-02-09 PROCEDURE — 0502F SUBSEQUENT PRENATAL CARE: CPT | Performed by: ADVANCED PRACTICE MIDWIFE

## 2021-02-09 PROCEDURE — 76805 OB US >/= 14 WKS SNGL FETUS: CPT | Performed by: OBSTETRICS & GYNECOLOGY

## 2021-02-09 PROCEDURE — 99211 OFF/OP EST MAY X REQ PHY/QHP: CPT | Performed by: ADVANCED PRACTICE MIDWIFE

## 2021-02-09 PROCEDURE — G8427 DOCREV CUR MEDS BY ELIG CLIN: HCPCS | Performed by: ADVANCED PRACTICE MIDWIFE

## 2021-02-09 PROCEDURE — G8484 FLU IMMUNIZE NO ADMIN: HCPCS | Performed by: ADVANCED PRACTICE MIDWIFE

## 2021-02-09 PROCEDURE — 1036F TOBACCO NON-USER: CPT | Performed by: ADVANCED PRACTICE MIDWIFE

## 2021-02-09 PROCEDURE — G8417 CALC BMI ABV UP PARAM F/U: HCPCS | Performed by: ADVANCED PRACTICE MIDWIFE

## 2021-02-09 ASSESSMENT — PATIENT HEALTH QUESTIONNAIRE - PHQ9
2. FEELING DOWN, DEPRESSED OR HOPELESS: 0
SUM OF ALL RESPONSES TO PHQ QUESTIONS 1-9: 0
SUM OF ALL RESPONSES TO PHQ9 QUESTIONS 1 & 2: 0

## 2021-02-09 NOTE — PATIENT INSTRUCTIONS
Patient Education        Weeks 18 to 22 of Your Pregnancy: Care Instructions  Your Care Instructions     Your baby is continuing to develop quickly. At this stage, babies can now suck their thumbs,  firmly with their hands, and open and close their eyelids. Sometime between 18 and 22 weeks, you will start to feel your baby move. At first, these small fetal movements feel like fluttering or \"butterflies. \" Some women say that they feel like gas bubbles. As the baby grows, these movements will become stronger. You may also notice that your baby kicks and hiccups. During this time, you may find that your nausea and fatigue are gone. Overall, you may feel better and have more energy than you did in your first trimester. But you may also have new discomforts now, such as sleep problems or leg cramps. This care sheet can help you ease these discomforts. Follow-up care is a key part of your treatment and safety. Be sure to make and go to all appointments, and call your doctor if you are having problems. It's also a good idea to know your test results and keep a list of the medicines you take. How can you care for yourself at home? Ease sleep problems  · Avoid caffeine in drinks or chocolate late in the day. · Get some exercise every day. · Take a warm shower or bath before bed. · Have a light snack or glass of milk at bedtime. · Do relaxation exercises in bed to calm your mind and body. · Support your legs and back with extra pillows. Try a pillow between your legs if you sleep on your side. · Do not use sleeping pills or alcohol. They could harm your baby. Ease leg cramps  · Do not massage your calf during the cramp. · Sit on a firm bed or chair. Straighten your leg, and bend your foot (flex your ankle) slowly upward, toward your knee. Bend your toes up and down. · Stand on a cool, flat surface. Stretch your toes upward, and take small steps walking on your heels.   · Use a heating pad or hot water bottle to help with muscle ache. Prevent leg cramps  · Be sure to get enough calcium. If you are worried that you are not getting enough, talk to your doctor. · Exercise every day, and stretch your legs before bed. · Take a warm bath before bed, and try leg warmers at night. Where can you learn more? Go to https://chperyewevangelista.healthBeavEx. org and sign in to your [employer] account. Enter H203 in the [employer] [place] box to learn more about \"Weeks 18 to 22 of Your Pregnancy: Care Instructions. \"     If you do not have an account, please click on the \"Sign Up Now\" link. Current as of: February 11, 2020               Content Version: 12.6  © 5739-1903 [employer]orporated. Care instructions adapted under license by [facility] ([facility]). If you have questions about a medical condition or this instruction, always ask your healthcare professional. [address] any warranty or liability for your use of this information.

## 2021-02-09 NOTE — PROGRESS NOTES
Stephie Mcgarry is here at 17w6d for:    Chief Complaint   Patient presents with    Routine Prenatal Visit     F/U in house 20 week u/s. pt does not want to know gender. pt states no issues or concerns. pt is concerned about her weight she states she lost 4lbs.  pt would like to discuss the protein in her urine        Estimated Due Date: Estimated Date of Delivery: 21    OB History    Para Term  AB Living   2 1 1     1   SAB TAB Ectopic Molar Multiple Live Births             1      # Outcome Date GA Lbr Poli/2nd Weight Sex Delivery Anes PTL Lv   2 Current            1 Term 14 40w0d  7 lb 6 oz (3.345 kg) F Vag-Spont  N NAYELI        Past Medical History:   Diagnosis Date    Deafness     Ganglion cyst of wrist     left    Hereditary nephropathy (Alport's) as achild     had renal biopsy    Irritable bowel syndrome     Kidney disease     Microscopic hematuria     Obesity     Proteinuria     Sensorineural hearing loss     Thin basement membrane disease     Bx proven       Past Surgical History:   Procedure Laterality Date    COLONOSCOPY      Dr. Zaira Good Left 2015    CYSTOSCOPY  2018    with stent placement    KIDNEY BIOPSY  98    LAPAROSCOPY      [place] CYSTO/URETERO W/LITHOTRIPSY &INDWELL STENT INSRT Right 2018    CYSTOSCOPY URETEROSCOPY LASER, HLL performed by Leonidas [facility]ero MD at [facility] &INDWELL STENT INSRT Right 2018    CYSTOSCOPY STENT INSERTION performed by Leonidas Romero MD at [address] [place] [address] URETEROSCOPY  2015    WISDOM TOOTH EXTRACTION      WRIST SURGERY      cyst removal       Social History     Tobacco Use   Smoking Status Former Smoker    Types: Cigarettes    Quit date: 3/18/2014    Years since quittin.9   Smokeless Tobacco Never Used   Tobacco Comment    several cigarettes daily        Social History     Substance and Sexual Activity   Alcohol Use No    Alcohol/week: 0.0 standard drinks       No results found for this visit on 02/09/21. Vitals:  /80   Wt 228 lb (103.4 kg)   LMP 09/28/2020 (Exact Date)   BMI 36.80 kg/m²   Estimated body mass index is 36.8 kg/m² as calculated from the following:    Height as of 9/15/20: 5' 6\" (1.676 m). Weight as of this encounter: 228 lb (103.4 kg). HPI: Patient here today for OB appointment patient is 17 weeks and 6 days and did have anatomy scan. Around able to see all views will follow-up at the next appointment. Patient states her pain is well controlled with Tylenol and a heating pad at this point in time but will let us know if anything changes. Patient's mother asks multiple questions about delivery plan of care etc.    Yes PT denies fever, chills, nausea and vomiting       Abdomen: enlarged, soft     Cervix:  3.2 cm    Results reviewed today:    17.6 WK IUP  CL:3.2cm  HR:159bpm   Anterior placenta, transverse presentation  Ovaries: both seen, wnl  Gender: male   Active fetal movements  All visualized fetal anatomy WNL   Limited spine, heart views, and face/profile; repeat imaging at next appointment    See prenatal vital sign section and fetal assessment section    ASSESSMENT & Plan    Diagnosis Orders   1. 17 weeks gestation of pregnancy     2. Renal calculus, right     3. Rh negative status during pregnancy in second trimester         We did discuss current rules and regulations to visitors only no children under the age of 13. Only 2 postpartum visitors no visitors always patient is Covid positive      I am having [address] Jona maintain her acetaminophen, sertraline, dicyclomine, naproxen, hyoscyamine, ketorolac, Prenatal Vit-Fe Fumarate-FA (PRENATAL VITAMIN PO), and terconazole. No follow-ups on file. Patient Instructions     Patient Education        Weeks 18 to 25 of Your Pregnancy: Care Instructions  Your Care Instructions     Your baby is continuing to develop quickly.  At this stage, babies can now suck their thumbs,  firmly with their hands, and open and close their eyelids. Sometime between 18 and 22 weeks, you will start to feel your baby move. At first, these small fetal movements feel like fluttering or \"butterflies. \" Some women say that they feel like gas bubbles. As the baby grows, these movements will become stronger. You may also notice that your baby kicks and hiccups. During this time, you may find that your nausea and fatigue are gone. Overall, you may feel better and have more energy than you did in your first trimester. But you may also have new discomforts now, such as sleep problems or leg cramps. This care sheet can help you ease these discomforts. Follow-up care is a key part of your treatment and safety. Be sure to make and go to all appointments, and call your doctor if you are having problems. It's also a good idea to know your test results and keep a list of the medicines you take. How can you care for yourself at home? Ease sleep problems  · Avoid caffeine in drinks or chocolate late in the day. · Get some exercise every day. · Take a warm shower or bath before bed. · Have a light snack or glass of milk at bedtime. · Do relaxation exercises in bed to calm your mind and body. · Support your legs and back with extra pillows. Try a pillow between your legs if you sleep on your side. · Do not use sleeping pills or alcohol. They could harm your baby. Ease leg cramps  · Do not massage your calf during the cramp. · Sit on a firm bed or chair. Straighten your leg, and bend your foot (flex your ankle) slowly upward, toward your knee. Bend your toes up and down. · Stand on a cool, flat surface. Stretch your toes upward, and take small steps walking on your heels. · Use a heating pad or hot water bottle to help with muscle ache. Prevent leg cramps  · Be sure to get enough calcium. If you are worried that you are not getting enough, talk to your doctor.   · Exercise every day, and stretch your legs before bed. · Take a warm bath before bed, and try leg warmers at night. Where can you learn more? Go to https://chpepiceweb.healthAttention Sciences. org and sign in to your [employer] account. Enter X466 in the [employer] box to learn more about \"Weeks 18 to 22 of Your Pregnancy: Care Instructions. \"     If you do not have an account, please click on the \"Sign Up Now\" link. Current as of: February 11, 2020               Content Version: 12.6  © 0518-6959 [employer] Incorporated. Care instructions adapted under license by [facility] ([facility]). If you have questions about a medical condition or this instruction, always ask your healthcare professional. [address] any warranty or liability for your use of this information.                    Colton Dennison,2/9/2021 12:12 PM

## 2021-02-25 ENCOUNTER — APPOINTMENT (OUTPATIENT)
Dept: ULTRASOUND IMAGING | Age: 26
End: 2021-02-25
Payer: MEDICARE

## 2021-02-25 ENCOUNTER — TELEPHONE (OUTPATIENT)
Dept: OBGYN | Age: 26
End: 2021-02-25

## 2021-02-25 ENCOUNTER — HOSPITAL ENCOUNTER (OUTPATIENT)
Age: 26
Discharge: HOME | End: 2021-02-25
Attending: ADVANCED PRACTICE MIDWIFE | Admitting: ADVANCED PRACTICE MIDWIFE
Payer: MEDICARE

## 2021-02-25 VITALS
SYSTOLIC BLOOD PRESSURE: 124 MMHG | TEMPERATURE: 98.1 F | HEART RATE: 84 BPM | DIASTOLIC BLOOD PRESSURE: 76 MMHG | RESPIRATION RATE: 16 BRPM

## 2021-02-25 LAB
-: ABNORMAL
AMORPHOUS: ABNORMAL
BACTERIA: ABNORMAL
BILIRUBIN URINE: NEGATIVE
CASTS UA: ABNORMAL /LPF
COLOR: YELLOW
COMMENT UA: ABNORMAL
CRYSTALS, UA: ABNORMAL /HPF
EPITHELIAL CELLS UA: ABNORMAL /HPF (ref 0–25)
GLUCOSE URINE: NEGATIVE
KETONES, URINE: NEGATIVE
LEUKOCYTE ESTERASE, URINE: NEGATIVE
MUCUS: ABNORMAL
NITRITE, URINE: NEGATIVE
OTHER OBSERVATIONS UA: ABNORMAL
PH UA: 6.5 (ref 5–9)
PROTEIN UA: NEGATIVE
RBC UA: ABNORMAL /HPF (ref 0–2)
RENAL EPITHELIAL, UA: ABNORMAL /HPF
SPECIFIC GRAVITY UA: 1.02 (ref 1.01–1.02)
TRICHOMONAS: ABNORMAL
TURBIDITY: CLEAR
URINE HGB: NEGATIVE
UROBILINOGEN, URINE: NORMAL
WBC UA: ABNORMAL /HPF (ref 0–5)
YEAST: ABNORMAL

## 2021-02-25 PROCEDURE — 76770 US EXAM ABDO BACK WALL COMP: CPT

## 2021-02-25 PROCEDURE — 76805 OB US >/= 14 WKS SNGL FETUS: CPT

## 2021-02-25 PROCEDURE — 81001 URINALYSIS AUTO W/SCOPE: CPT

## 2021-02-25 NOTE — TELEPHONE ENCOUNTER
Pt calls in c/o vaginal bleeding and vaginal and urinary cramping. Pt is currently 20 weeks pregnant and getting really worried. I spoke with Dianne Marcelo and advised her to go to the ob department. Pt states she is heading there now.

## 2021-02-25 NOTE — LETTER
[address] Delivery  [address] [place] [address] [place] [address]  Phone: [phone]    No name on file. February 25, 2021    Eduar Burkett was seen in our unit 2/25/21      Sincerely,  [facility]      No name on file.

## 2021-02-25 NOTE — FLOWSHEET NOTE
BUZZ ZALDIVARM notified of ultrasound results, UA results, SVE and blood pressures. Pt ok to be discharged home.

## 2021-02-25 NOTE — FLOWSHEET NOTE
Pt made aware that urine is to be strained. SVE performed at this time.  Awaiting results of renal US and UA results

## 2021-02-25 NOTE — FLOWSHEET NOTE
Pt arrives to unit. Pt is deaf. Stating she has been having pain \"for about 30min\". Points to bilateral sides of abdomen and lower pelvic area for pain. Rates pain 7/10. Pt state that she noticed a quarter size amount of dark/light pink when she was using bathroom. States she has been feeling baby move. Vitals obtained. UA collected.  BUZZ Dennison CNM phoned unit prior to pts arrival with orders

## 2021-03-11 ENCOUNTER — HOSPITAL ENCOUNTER (EMERGENCY)
Age: 26
Discharge: HOME | End: 2021-03-11
Payer: MEDICARE

## 2021-03-11 VITALS
OXYGEN SATURATION: 99 % | HEART RATE: 82 BPM | SYSTOLIC BLOOD PRESSURE: 133 MMHG | DIASTOLIC BLOOD PRESSURE: 81 MMHG | TEMPERATURE: 98.1 F | RESPIRATION RATE: 16 BRPM

## 2021-03-11 DIAGNOSIS — S61.411A LACERATION OF RIGHT HAND, FOREIGN BODY PRESENCE UNSPECIFIED, INITIAL ENCOUNTER: Primary | ICD-10-CM

## 2021-03-11 PROCEDURE — 12001 RPR S/N/AX/GEN/TRNK 2.5CM/<: CPT

## 2021-03-11 PROCEDURE — 99281 EMR DPT VST MAYX REQ PHY/QHP: CPT

## 2021-03-11 RX ORDER — BACITRACIN, NEOMYCIN, POLYMYXIN B 400; 3.5; 5 [USP'U]/G; MG/G; [USP'U]/G
OINTMENT TOPICAL
Status: DISCONTINUED
Start: 2021-03-11 | End: 2021-03-11 | Stop reason: HOSPADM

## 2021-03-11 ASSESSMENT — ENCOUNTER SYMPTOMS
RHINORRHEA: 0
BACK PAIN: 0
EYE REDNESS: 0
SORE THROAT: 0
CONSTIPATION: 0
NAUSEA: 0
EYE DISCHARGE: 0
DIARRHEA: 0
ABDOMINAL PAIN: 0
COUGH: 0
BLOOD IN STOOL: 0
WHEEZING: 0
CHEST TIGHTNESS: 0
VOMITING: 0
SHORTNESS OF BREATH: 0

## 2021-03-11 NOTE — ED NOTES
ATB ointment, and non adhessive dressing applied to the sutures.       Isaura Barreto RN  03/11/21 1731

## 2021-03-12 NOTE — ED PROVIDER NOTES
[facility] ED  EMERGENCY DEPARTMENT ENCOUNTER      Pt Name: Marquis Corcoran  MRN: [id number]  [place] 1995  Date of evaluation: 3/11/2021  Provider: Kenneth Catalan Dr     Chief Complaint   Patient presents with    Laceration     Right hand, onset 30mins ago while washing dishes         HISTORY OF PRESENT ILLNESS   (Location/Symptom, Timing/Onset, Context/Setting,Quality, Duration, Modifying Factors, Severity)  Note limiting factors. Marquis Corcoran is a29 y.o. female who presents to the emergency department with complaints of right hand laceration onset 30 minutes prior to arrival.  Patient reports was washing dishes when a piece of glass broke and cut her. Reports the glass is intact. She states that there are no small pieces. Reports she is unsure of tetanus status but reports that is in the computer most likely because she gets her care here. She denies any other injury associated with this event. She has no other complaints this time. HPI    Nursing Notes werereviewed. REVIEW OF SYSTEMS    (2-9 systems for level 4, 10 or more for level 5)     Review of Systems   Constitutional: Negative for chills, diaphoresis and fever. HENT: Negative for congestion, ear pain, rhinorrhea and sore throat. Eyes: Negative for discharge, redness and visual disturbance. Respiratory: Negative for cough, chest tightness, shortness of breath and wheezing. Cardiovascular: Negative for chest pain and palpitations. Gastrointestinal: Negative for abdominal pain, blood in stool, constipation, diarrhea, nausea and vomiting. Endocrine: Negative for polydipsia, polyphagia and polyuria. Genitourinary: Negative for decreased urine volume, difficulty urinating, dysuria, frequency and hematuria. Musculoskeletal: Negative for arthralgias, back pain and myalgias. Skin: Positive for wound. Negative for pallor and rash.    Allergic/Immunologic: Negative for food allergies and immunocompromised state. Neurological: Negative for dizziness, syncope, weakness and light-headedness. Hematological: Negative for adenopathy. Does not bruise/bleed easily. Psychiatric/Behavioral: Negative for behavioral problems and suicidal ideas. The patient is not nervous/anxious. Except as noted above the remainder of the review of systems was reviewed and negative.        PAST MEDICAL HISTORY     Past Medical History:   Diagnosis Date    Deafness     Ganglion cyst of wrist 2010    left    Hereditary nephropathy (Alport's) as achild     had renal biopsy    Irritable bowel syndrome     Kidney disease     Microscopic hematuria     Obesity     Proteinuria     Sensorineural hearing loss     Thin basement membrane disease     Bx proven         SURGICALHISTORY       Past Surgical History:   Procedure Laterality Date    COLONOSCOPY  2009    Dr. Lilia Mario Left 8/18/2015    CYSTOSCOPY  11/14/2018    with stent placement    KIDNEY BIOPSY  12/29/98    LAPAROSCOPY      [place] CYSTO/URETERO W/LITHOTRIPSY &INDWELL STENT INSRT Right 11/14/2018    CYSTOSCOPY URETEROSCOPY LASER, HLL performed by Elnor Bence, MD at [facility] &INDWELL STENT INSRT Right 11/14/2018    CYSTOSCOPY STENT INSERTION performed by Elnor Bence, MD at [address] [place] [address] URETEROSCOPY  8-    WISDOM TOOTH EXTRACTION      WRIST SURGERY  2010    cyst removal         CURRENT MEDICATIONS       Discharge Medication List as of 3/11/2021  4:48 PM      CONTINUE these medications which have NOT CHANGED    Details   terconazole (TERAZOL 7) 0.4 % vaginal cream One lelo per vaginal for 7 nights, Disp-1 Tube, R-0Normal      Prenatal Vit-Fe Fumarate-FA (PRENATAL VITAMIN PO) Take by mouthHistorical Med      ketorolac (TORADOL) 10 MG tablet Take 1 tablet by mouth every 6 hours as needed for Pain, Disp-12 tablet,R-0Normal      hyoscyamine (LEVSIN/SL) 125 MCG sublingual tablet Place 1 tablet under the tongue every 6 hours as needed (abdominal pain), Disp-20 tablet, R-0Normal      naproxen (NAPROSYN) 500 MG tablet Take 1 tablet by mouth 2 times daily as needed for Pain (/ Take with food and a full glass of water), Disp-20 tablet, R-0Normal      dicyclomine (BENTYL) 10 MG capsule Take 1 capsule by mouth 4 times daily (before meals and nightly), Disp-12 capsule, R-0Normal      sertraline (ZOLOFT) 50 MG tablet TAKE 1 TABLET BY MOUTH ONCE A DAY, Disp-90 tablet, R-3NO PRINT      acetaminophen (TYLENOL) 500 MG tablet Take 500 mg by mouth every 6 hours as needed for PainHistorical Med               ALLERGIES   Patient has no known allergies.     FAMILY HISTORY       Family History   Problem Relation Age of Onset    Cancer Paternal Grandmother         bone    High Cholesterol Mother     Cancer Mother         breast    High Blood Pressure Mother     Migraines Mother     Alcohol Abuse Father     Anxiety Disorder Father     Depression Father     Hearing Loss Brother     Hearing Loss Brother           SOCIAL HISTORY       Social History     Socioeconomic History    Marital status: Single     Spouse name: Not on file    Number of children: Not on file    Years of education: Not on file    Highest education level: Not on file   Occupational History    Not on file   Social Needs    Financial resource strain: Not on file    Food insecurity     Worry: Not on file     Inability: Not on file    Transportation needs     Medical: Not on file     Non-medical: Not on file   Tobacco Use    Smoking status: Former Smoker     Types: Cigarettes     Quit date: 3/18/2014     Years since quittin.9    Smokeless tobacco: Never Used    Tobacco comment: several cigarettes daily   Substance and Sexual Activity    Alcohol use: No     Alcohol/week: 0.0 standard drinks    Drug use: No    Sexual activity: Yes     Partners: Male   Lifestyle    Physical activity     Days per week: Not on file     Minutes per session: Not on operative also is in 3 seconds. Skin:     General: Skin is warm and dry. Capillary Refill: Capillary refill takes less than 2 seconds. Findings: No erythema or rash. Neurological:      Mental Status: She is alert and oriented to person, place, and time. Cranial Nerves: No cranial nerve deficit. Motor: No abnormal muscle tone. Deep Tendon Reflexes: Reflexes are normal and symmetric. Psychiatric:         Behavior: Behavior normal.         DIAGNOSTIC RESULTS     EKG: All EKG's are interpreted by the Emergency Department Physician who either signs orCo-signs this chart in the absence of a cardiologist.      RADIOLOGY:   Non-plainfilm images such as CT, Ultrasound and MRI are read by the radiologist. Plain radiographic images are visualized and preliminarily interpreted by the emergency physician with the below findings:      Interpretationper the Radiologist below, if available at the time of this note:    No orders to display         ED BEDSIDE ULTRASOUND:   Performed by ED Physician - none    LABS:  Labs Reviewed - No data to display    All other labs were within normal range or not returned as of this dictation. EMERGENCY DEPARTMENT COURSE and DIFFERENTIAL DIAGNOSIS/MDM:   Vitals:    Vitals:    03/11/21 1501 03/11/21 1647   BP: (!) 138/97 133/81   Pulse: 98 82   Resp: 16    Temp: 98.1 °F (36.7 °C)    TempSrc: Oral    SpO2: 99%          MDM  79-year-old female who is deaf present secondary to right hand laceration will she was doing dishes. She is pregnant. She does not want x-ray of the hand. It seems to be rather superficial no visualized glass she is able to move her hand fully. There is no tendon injury there is no muscular exposure. I did use virtual sign language on the iPad to communicate with patient. Will repair the wound. She is already updated on her tetanus shot. She is updated on her tetanus shot which is noted in epic. Had it in 2018.     Patient tolerated seizure well tonic uppercase. She understands to have stitches removed in 7 days. She'll call primary care. Strict and specific terms of given. She verbally agrees plan will otherwise return the ER with any worse complaints. Lac Repair    Date/Time: 3/11/2021 11:02 PM  Performed by: Alondra Juarez PA-C  Authorized by: Alondra Juarez PA-C     Consent:     Consent obtained:  Verbal    Consent given by:  Patient    Risks discussed:  Infection, pain, retained foreign body, poor cosmetic result, tendon damage and poor wound healing  Anesthesia (see MAR for exact dosages): Anesthesia method:  Local infiltration    Local anesthetic:  Lidocaine 1% w/o epi  Laceration details:     Location:  Hand    Hand location:  R hand, dorsum  Repair type:     Repair type:  Simple  Pre-procedure details:     Preparation:  Patient was prepped and draped in usual sterile fashion  Exploration:     Hemostasis achieved with:  Direct pressure    Wound exploration: wound explored through full range of motion      Contaminated: no    Treatment:     Area cleansed with:  Betadine    Amount of cleaning:  Standard    Irrigation solution:  Sterile saline    Visualized foreign bodies/material removed: no    Skin repair:     Repair method:  Sutures    Suture material:  Prolene    Suture technique:  Simple interrupted    Number of sutures:  4  Post-procedure details:     Dressing:  Antibiotic ointment and non-adherent dressing    Patient tolerance of procedure: Tolerated well, no immediate complications        FINAL IMPRESSION      1.  Laceration of right hand, foreign body presence unspecified, initial encounter        DISPOSITION/PLAN   DISPOSITION Decision To Discharge 03/11/2021 04:43:57 PM      PATIENT REFERRED TO:  [address]  [phone]    If symptoms worsen, As needed    Lisa Edwards MD  [facility], [address]  [phone]    Schedule an appointment as soon as possible for a visit   For wound re-check      DISCHARGE MEDICATIONS:  Discharge Medication List as of 3/11/2021  4:48 PM                 Summation      Patient Course:      ED Medications administered this visit:  Medications - No data to display    New Prescriptions from this visit:    Discharge Medication List as of 3/11/2021  4:48 PM          Follow-up:  [facility]  [address]  [phone]    If symptoms worsen, As needed    Carmen Wilburn MD  [place] [address], [address]  [phone]    Schedule an appointment as soon as possible for a visit   For wound re-check        Final Impression:   1.  Laceration of right hand, foreign body presence unspecified, initial encounter               (Please note that portions of this note were completed with a voice recognition program.  Efforts were made to edit the dictations but occasionally words are mis-transcribed.)           Pérez Gandara PA-C  03/11/21 1553

## 2021-03-22 ENCOUNTER — ANCILLARY PROCEDURE (OUTPATIENT)
Dept: OBGYN | Age: 26
End: 2021-03-22
Payer: MEDICARE

## 2021-03-22 ENCOUNTER — ROUTINE PRENATAL (OUTPATIENT)
Dept: OBGYN | Age: 26
End: 2021-03-22
Payer: MEDICARE

## 2021-03-22 VITALS — WEIGHT: 241 LBS | BODY MASS INDEX: 38.9 KG/M2 | SYSTOLIC BLOOD PRESSURE: 124 MMHG | DIASTOLIC BLOOD PRESSURE: 80 MMHG

## 2021-03-22 DIAGNOSIS — Z3A.23 23 WEEKS GESTATION OF PREGNANCY: ICD-10-CM

## 2021-03-22 DIAGNOSIS — O26.892 RH NEGATIVE STATUS DURING PREGNANCY IN SECOND TRIMESTER: ICD-10-CM

## 2021-03-22 DIAGNOSIS — Z67.91 RH NEGATIVE STATUS DURING PREGNANCY IN SECOND TRIMESTER: ICD-10-CM

## 2021-03-22 DIAGNOSIS — Z36.3 ANTENATAL SCREENING FOR MALFORMATION USING ULTRASONICS: ICD-10-CM

## 2021-03-22 DIAGNOSIS — Z3A.23 23 WEEKS GESTATION OF PREGNANCY: Primary | ICD-10-CM

## 2021-03-22 PROCEDURE — 1036F TOBACCO NON-USER: CPT | Performed by: ADVANCED PRACTICE MIDWIFE

## 2021-03-22 PROCEDURE — 76815 OB US LIMITED FETUS(S): CPT | Performed by: OBSTETRICS & GYNECOLOGY

## 2021-03-22 PROCEDURE — G8417 CALC BMI ABV UP PARAM F/U: HCPCS | Performed by: ADVANCED PRACTICE MIDWIFE

## 2021-03-22 PROCEDURE — G8484 FLU IMMUNIZE NO ADMIN: HCPCS | Performed by: ADVANCED PRACTICE MIDWIFE

## 2021-03-22 PROCEDURE — G8427 DOCREV CUR MEDS BY ELIG CLIN: HCPCS | Performed by: ADVANCED PRACTICE MIDWIFE

## 2021-03-22 PROCEDURE — 99213 OFFICE O/P EST LOW 20 MIN: CPT | Performed by: ADVANCED PRACTICE MIDWIFE

## 2021-03-22 NOTE — PROGRESS NOTES
Opal De Jesus is here at 23w5d for:    Chief Complaint   Patient presents with    Routine Prenatal Visit     Pt here for routine prenatal visit and follow up ultrasound. Pt states no questions or concerns today       Estimated Due Date: Estimated Date of Delivery: 21    OB History    Para Term  AB Living   2 1 1     1   SAB TAB Ectopic Molar Multiple Live Births             1      # Outcome Date GA Lbr Poli/2nd Weight Sex Delivery Anes PTL Lv   2 Current            1 Term 14 40w0d  7 lb 6 oz (3.345 kg) F Vag-Spont  N NAYELI        Past Medical History:   Diagnosis Date    Deafness     Ganglion cyst of wrist     left    Hereditary nephropathy (Alport's) as achild     had renal biopsy    Irritable bowel syndrome     Kidney disease     Microscopic hematuria     Obesity     Proteinuria     Sensorineural hearing loss     Thin basement membrane disease     Bx proven       Past Surgical History:   Procedure Laterality Date    COLONOSCOPY      Dr. Romero Smaller Left 2015    CYSTOSCOPY  2018    with stent placement    KIDNEY BIOPSY  98    LAPAROSCOPY      [place] CYSTO/URETERO W/LITHOTRIPSY &INDWELL STENT INSRT Right 2018    CYSTOSCOPY URETEROSCOPY LASER, HLL performed by [facility]ay Morin [facility] at [facility] &INDWELL STENT INSRT Right 2018    CYSTOSCOPY STENT INSERTION performed by Shay Morin MD at [address] [place] [address] URETEROSCOPY  2015    WISDOM TOOTH EXTRACTION      WRIST SURGERY      cyst removal       Social History     Tobacco Use   Smoking Status Former Smoker    Types: Cigarettes    Quit date: 3/18/2014    Years since quittin.0   Smokeless Tobacco Never Used   Tobacco Comment    several cigarettes daily        Social History     Substance and Sexual Activity   Alcohol Use No    Alcohol/week: 0.0 standard drinks       No results found for this visit on 21.     Vitals:  /80   Wt 241 lb (109.3 kg)   LMP 09/28/2020 (Exact Date)   BMI 38.90 kg/m²   Estimated body mass index is 38.9 kg/m² as calculated from the following:    Height as of 3/19/21: 5' 6\" (1.676 m). Weight as of this encounter: 241 lb (109.3 kg). HPI: Here today with her . Patient denies needs or concerns at this point in time states she is feeling good fetal movements her nausea is better  We did review glucose testing at the next appointment with her RhoGam injection    Yes PT denies fever, chills, nausea and vomiting       Abdomen: enlarged, soft     Cervix:  3.4 cm      Results reviewed today:    24.0 WK IUP  CL:3.4 cm  HR: 152 bpm, echogenic foci lt ventricle  Anterior placenta, vertex presentation  Gender:  male  Active fetal movements  All visualized fetal anatomy grossly WNL   Fetal spine, lips/nose, head & heart anatomy visualized    See prenatal vital sign section and fetal assessment section    ASSESSMENT & Plan    Diagnosis Orders   1. 23 weeks gestation of pregnancy     2. Rh negative status during pregnancy in second trimester               I am having Pretty Tenorio maintain her acetaminophen, sertraline, dicyclomine, hyoscyamine, Prenatal Vit-Fe Fumarate-FA (PRENATAL VITAMIN PO), and terconazole. Return for Keep next appt. Patient Instructions     Patient Education        Weeks 22 to 32 of Your Pregnancy: Care Instructions  Overview     As you enter your 7th month of pregnancy at week 26, your baby's lungs are growing stronger and getting ready to breathe. You may notice that your baby responds to the sound of your or your partner's voice. You may also notice that your baby does less turning and twisting and more squirming, kicking, or jerking. Jerking often means that your baby has hiccups. Hiccups are normal and are only temporary. You may want to think about attending a childbirth preparation class.  This is also a good time to start thinking about whether you want to have pain the \"Sign Up Now\" link. Current as of: October 8, 2020               Content Version: 12.8  © 2006-2021 [employer]. Care instructions adapted under license by [facility] ([facility]). If you have questions about a medical condition or this instruction, always ask your healthcare professional. [address] any warranty or liability for your use of this information.                    Fernando Dennison,3/22/2021 10:20 AM

## 2021-03-22 NOTE — PATIENT INSTRUCTIONS
Patient Education        Weeks 22 to 26 of Your Pregnancy: Care Instructions  Overview     As you enter your 7th month of pregnancy at week 26, your baby's lungs are growing stronger and getting ready to breathe. You may notice that your baby responds to the sound of your or your partner's voice. You may also notice that your baby does less turning and twisting and more squirming, kicking, or jerking. Jerking often means that your baby has hiccups. Hiccups are normal and are only temporary. You may want to think about attending a childbirth preparation class. This is also a good time to start thinking about whether you want to have pain medicine during labor. Most pregnant women are tested for gestational diabetes between weeks 25 and 28. Gestational diabetes occurs when your blood sugar level gets too high when you're pregnant. The test is important, because you can have gestational diabetes and not know it. But the condition can cause problems for your baby. Follow-up care is a key part of your treatment and safety. Be sure to make and go to all appointments, and call your doctor if you are having problems. It's also a good idea to know your test results and keep a list of the medicines you take. How can you care for yourself at home? Ease discomfort from your baby's kicking  · Change your position. Sometimes this will cause your baby to change position too. · Take a deep breath while you raise your arm over your head. Then breathe out while you drop your arm. Do Kegel exercises to prevent urine from leaking  · You can do Kegel exercises while you stand or sit. ? Squeeze the same muscles you would use to stop your urine. Your belly and thighs should not move. ? Hold the squeeze for 3 seconds, and then relax for 3 seconds. ? Start with 3 seconds. Then add 1 second each week until you are able to squeeze for 10 seconds. ? Repeat the exercise 10 to 15 times for each session.  Do three or more sessions

## 2021-04-08 ENCOUNTER — ROUTINE PRENATAL (OUTPATIENT)
Dept: OBGYN | Age: 26
End: 2021-04-08
Payer: MEDICARE

## 2021-04-08 VITALS — WEIGHT: 240 LBS | DIASTOLIC BLOOD PRESSURE: 100 MMHG | BODY MASS INDEX: 38.74 KG/M2 | SYSTOLIC BLOOD PRESSURE: 148 MMHG

## 2021-04-08 DIAGNOSIS — O26.893 RH NEGATIVE STATE IN ANTEPARTUM PERIOD, THIRD TRIMESTER: ICD-10-CM

## 2021-04-08 DIAGNOSIS — Z13.1 ENCOUNTER FOR SCREENING EXAMINATION FOR IMPAIRED GLUCOSE REGULATION AND DIABETES MELLITUS: ICD-10-CM

## 2021-04-08 DIAGNOSIS — Z3A.26 26 WEEKS GESTATION OF PREGNANCY: Primary | ICD-10-CM

## 2021-04-08 DIAGNOSIS — Z67.91 RH NEGATIVE STATE IN ANTEPARTUM PERIOD, THIRD TRIMESTER: ICD-10-CM

## 2021-04-08 PROCEDURE — 1036F TOBACCO NON-USER: CPT | Performed by: ADVANCED PRACTICE MIDWIFE

## 2021-04-08 PROCEDURE — G8417 CALC BMI ABV UP PARAM F/U: HCPCS | Performed by: ADVANCED PRACTICE MIDWIFE

## 2021-04-08 PROCEDURE — G8427 DOCREV CUR MEDS BY ELIG CLIN: HCPCS | Performed by: ADVANCED PRACTICE MIDWIFE

## 2021-04-08 PROCEDURE — 99213 OFFICE O/P EST LOW 20 MIN: CPT | Performed by: ADVANCED PRACTICE MIDWIFE

## 2021-04-08 NOTE — PROGRESS NOTES
Kisha Maldonado is here at 26w1d for:    Chief Complaint   Patient presents with    Routine Prenatal Visit     PT here for routine prenatal. Pt states no concerns or questions at this time. Estimated Due Date: Estimated Date of Delivery: 21    OB History    Para Term  AB Living   2 1 1     1   SAB TAB Ectopic Molar Multiple Live Births             1      # Outcome Date GA Lbr Poli/2nd Weight Sex Delivery Anes PTL Lv   2 Current            1 Term 14 40w0d  7 lb 6 oz (3.345 kg) F Vag-Spont  N NAYELI        Past Medical History:   Diagnosis Date    Deafness     Ganglion cyst of wrist     left    Hereditary nephropathy (Alport's) as achild     had renal biopsy    Irritable bowel syndrome     Kidney disease     Microscopic hematuria     Obesity     Proteinuria     Sensorineural hearing loss     Thin basement membrane disease     Bx proven       Past Surgical History:   Procedure Laterality Date    COLONOSCOPY      Dr. Angus Gonzalez Left 2015    CYSTOSCOPY  2018    with stent placement    KIDNEY BIOPSY  98    LAPAROSCOPY      [place] CYSTO/URETERO W/LITHOTRIPSY &INDWELL STENT INSRT Right 2018    CYSTOSCOPY URETEROSCOPY LASER, HLL performed by Iantha Sandhoff, MD at [facility] &INDWELL STENT INSRT Right 2018    CYSTOSCOPY STENT INSERTION performed by Iantha Sandhoff, MD at [address] [place] [address] URETEROSCOPY  2015    WISDOM TOOTH EXTRACTION      WRIST SURGERY      cyst removal       Social History     Tobacco Use   Smoking Status Former Smoker    Types: Cigarettes    Quit date: 3/18/2014    Years since quittin.0   Smokeless Tobacco Never Used   Tobacco Comment    several cigarettes daily        Social History     Substance and Sexual Activity   Alcohol Use No    Alcohol/week: 0.0 standard drinks       No results found for this visit on 21.     Vitals:  BP (!) 148/100   Wt 240 lb (108.9 kg)

## 2021-04-09 ENCOUNTER — HOSPITAL ENCOUNTER (OUTPATIENT)
Dept: INFUSION THERAPY | Age: 26
Discharge: HOME | End: 2021-04-09
Payer: MEDICARE

## 2021-04-09 VITALS
RESPIRATION RATE: 20 BRPM | TEMPERATURE: 96.7 F | DIASTOLIC BLOOD PRESSURE: 71 MMHG | HEART RATE: 92 BPM | SYSTOLIC BLOOD PRESSURE: 135 MMHG

## 2021-04-09 DIAGNOSIS — Z67.91 RH NEGATIVE STATE IN ANTEPARTUM PERIOD, THIRD TRIMESTER: ICD-10-CM

## 2021-04-09 DIAGNOSIS — O26.893 RH NEGATIVE STATE IN ANTEPARTUM PERIOD, THIRD TRIMESTER: ICD-10-CM

## 2021-04-09 DIAGNOSIS — Z3A.26 26 WEEKS GESTATION OF PREGNANCY: ICD-10-CM

## 2021-04-09 PROCEDURE — 36415 COLL VENOUS BLD VENIPUNCTURE: CPT

## 2021-04-09 PROCEDURE — 6360000002 HC RX W HCPCS: Performed by: ADVANCED PRACTICE MIDWIFE

## 2021-04-09 PROCEDURE — 96372 THER/PROPH/DIAG INJ SC/IM: CPT

## 2021-04-09 PROCEDURE — 86850 RBC ANTIBODY SCREEN: CPT

## 2021-04-09 PROCEDURE — 86901 BLOOD TYPING SEROLOGIC RH(D): CPT

## 2021-04-09 RX ADMIN — HUMAN RHO(D) IMMUNE GLOBULIN 300 MCG: 300 INJECTION, SOLUTION INTRAMUSCULAR at 15:42

## 2021-04-09 NOTE — PROGRESS NOTES
Interrupter Kayleigh [id number], used for check in and consent process, also with treatment nurse questions. Informed through the interrupter that was a problem with blood specimen and she will need to get drawn again and come back in 45 min. Implies understanding though the interrupter and agree to come back later to get shot. Blood drawn. Left with family. [id number] returned interrupter #[id number] for injection.

## 2021-04-11 LAB
ABO/RH: NORMAL
ANTIBODY SCREEN: NEGATIVE
BLD PROD TYP BPU: NORMAL
BLD PROD TYP BPU: NORMAL
DISPENSE STATUS BLOOD BANK: NORMAL
HISTORY CHECK: NORMAL
Lab: 1
TRANSFUSION STATUS: NORMAL
UNIT DIVISION: 0
UNIT NUMBER: NORMAL

## 2021-04-29 ENCOUNTER — TELEPHONE (OUTPATIENT)
Dept: OBGYN | Age: 26
End: 2021-04-29

## 2021-04-29 NOTE — TELEPHONE ENCOUNTER
Spoke with the lab and the GTT and hemoglobin were not done. Documentation shows that the lab picked up all 3 tubes. What do I do next?

## 2021-05-01 NOTE — TELEPHONE ENCOUNTER
What did lab say?  I know she drank it - if they did not run it make sure she knows they didn't run in - put in a safe acre and pt will need to redo it

## 2021-05-03 DIAGNOSIS — Z3A.30 30 WEEKS GESTATION OF PREGNANCY: Primary | ICD-10-CM

## 2021-05-03 NOTE — TELEPHONE ENCOUNTER
Patient called back in and I advised her that she had to have the tests redrawn and I apologized for the inconvenience. Tests reorderd.

## 2021-05-05 ENCOUNTER — HOSPITAL ENCOUNTER (OUTPATIENT)
Age: 26
Discharge: HOME | End: 2021-05-05
Payer: MEDICARE

## 2021-05-05 DIAGNOSIS — Z3A.30 30 WEEKS GESTATION OF PREGNANCY: ICD-10-CM

## 2021-05-05 LAB
GLUCOSE ADMINISTRATION: NORMAL
GLUCOSE TOLERANCE SCREEN 50G: 131 MG/DL (ref 70–135)
HEMOGLOBIN: 12.6 G/DL (ref 11.9–15.1)

## 2021-05-05 PROCEDURE — 36415 COLL VENOUS BLD VENIPUNCTURE: CPT

## 2021-05-05 PROCEDURE — 82950 GLUCOSE TEST: CPT

## 2021-05-05 PROCEDURE — 85018 HEMOGLOBIN: CPT

## 2021-05-06 ENCOUNTER — ROUTINE PRENATAL (OUTPATIENT)
Dept: OBGYN | Age: 26
End: 2021-05-06
Payer: MEDICARE

## 2021-05-06 ENCOUNTER — ANCILLARY PROCEDURE (OUTPATIENT)
Dept: OBGYN | Age: 26
End: 2021-05-06
Payer: MEDICARE

## 2021-05-06 VITALS — BODY MASS INDEX: 40.19 KG/M2 | SYSTOLIC BLOOD PRESSURE: 120 MMHG | DIASTOLIC BLOOD PRESSURE: 76 MMHG | WEIGHT: 249 LBS

## 2021-05-06 DIAGNOSIS — R12 HEARTBURN DURING PREGNANCY IN THIRD TRIMESTER: ICD-10-CM

## 2021-05-06 DIAGNOSIS — Z36.89 ENCOUNTER FOR ULTRASOUND TO CHECK FETAL GROWTH: ICD-10-CM

## 2021-05-06 DIAGNOSIS — Z3A.30 30 WEEKS GESTATION OF PREGNANCY: ICD-10-CM

## 2021-05-06 DIAGNOSIS — Z23 NEED FOR TDAP VACCINATION: ICD-10-CM

## 2021-05-06 DIAGNOSIS — Z3A.30 30 WEEKS GESTATION OF PREGNANCY: Primary | ICD-10-CM

## 2021-05-06 DIAGNOSIS — O26.893 HEARTBURN DURING PREGNANCY IN THIRD TRIMESTER: ICD-10-CM

## 2021-05-06 PROCEDURE — 99213 OFFICE O/P EST LOW 20 MIN: CPT | Performed by: ADVANCED PRACTICE MIDWIFE

## 2021-05-06 PROCEDURE — 90715 TDAP VACCINE 7 YRS/> IM: CPT | Performed by: ADVANCED PRACTICE MIDWIFE

## 2021-05-06 PROCEDURE — 1036F TOBACCO NON-USER: CPT | Performed by: ADVANCED PRACTICE MIDWIFE

## 2021-05-06 PROCEDURE — G8417 CALC BMI ABV UP PARAM F/U: HCPCS | Performed by: ADVANCED PRACTICE MIDWIFE

## 2021-05-06 PROCEDURE — G8427 DOCREV CUR MEDS BY ELIG CLIN: HCPCS | Performed by: ADVANCED PRACTICE MIDWIFE

## 2021-05-06 PROCEDURE — 76816 OB US FOLLOW-UP PER FETUS: CPT | Performed by: OBSTETRICS & GYNECOLOGY

## 2021-05-06 RX ORDER — OMEPRAZOLE 20 MG/1
20 CAPSULE, DELAYED RELEASE ORAL DAILY
Qty: 30 CAPSULE | Refills: 3 | Status: SHIPPED | OUTPATIENT
Start: 2021-05-06 | End: 2022-03-29

## 2021-05-06 NOTE — PROGRESS NOTES
Naren Porter is here at 30w1d for:    Chief Complaint   Patient presents with    Routine Prenatal Visit     Patent complains of heartnurn and back pain. She has been trying Tums, rolaids and tums with no relief. She has adjusted her diet with no relief. She is also being seen for FU 30 week ultrasound.          Estimated Due Date: Estimated Date of Delivery: 21    OB History    Para Term  AB Living   2 1 1     1   SAB TAB Ectopic Molar Multiple Live Births             1      # Outcome Date GA Lbr Poli/2nd Weight Sex Delivery Anes PTL Lv   2 Current            1 Term 14 40w0d  7 lb 6 oz (3.345 kg) F Vag-Spont  N NAYELI        Past Medical History:   Diagnosis Date    Deafness     Ganglion cyst of wrist     left    Hereditary nephropathy (Alport's) as achild     had renal biopsy    Irritable bowel syndrome     Kidney disease     Microscopic hematuria     Obesity     Proteinuria     Sensorineural hearing loss     Thin basement membrane disease     Bx proven       Past Surgical History:   Procedure Laterality Date    COLONOSCOPY      Dr. Javy Daugherty Left 2015    CYSTOSCOPY  2018    with stent placement    KIDNEY BIOPSY  98    LAPAROSCOPY      [place] CYSTO/URETERO W/LITHOTRIPSY &INDWELL STENT INSRT Right 2018    CYSTOSCOPY URETEROSCOPY LASER, HLL performed by Cruzito Zapien MD at [facility] &INDWELL STENT INSRT Right 2018    CYSTOSCOPY STENT INSERTION performed by Cruzito Zapien MD at [address] [place] [address] URETEROSCOPY  2015    WISDOM TOOTH EXTRACTION      WRIST SURGERY      cyst removal       Social History     Tobacco Use   Smoking Status Former Smoker    Types: Cigarettes    Quit date: 3/18/2014    Years since quittin.1   Smokeless Tobacco Never Used   Tobacco Comment    several cigarettes daily        Social History     Substance and Sexual Activity   Alcohol Use No    Alcohol/week: 0.0 standard drinks       No results found for this visit on 05/06/21. Vitals:  /76   Wt 249 lb (112.9 kg)   LMP 09/28/2020 (Exact Date)   BMI 40.19 kg/m²   Estimated body mass index is 40.19 kg/m² as calculated from the following:    Height as of 3/19/21: 5' 6\" (1.676 m). Weight as of this encounter: 249 lb (112.9 kg). HPI: Patient doing well today. Patient denies needs or concerns at this point in time. Yes PT denies fever, chills, nausea and vomiting       Abdomen: enlarged, soft         Results reviewed today:    Results for Shivam Dominique (MRN [id number]) as of 5/6/2021 09:13   Ref. Range 5/5/2021 08:57   GLU ADMN Unknown Glucola   Glucose tolerance screen 50g Latest Ref Range: 70 - 135 mg/dL 131   Hemoglobin Quant Latest Ref Range: 11.9 - 15.1 g/dL 12.6        31w3d WK IUP  EFW: 3lbs 13oz, 67%  CL: 3.3cm  MARIA M: 10.9cm  HR: 157bpm  Anterior placenta, cephalic presentation  Active fetal movements  Prominent renal pelvis- Kidneys: Rt: 0.3cm Lt: 0.3cm    See prenatal vital sign section and fetal assessment section    ASSESSMENT & Plan    Diagnosis Orders   1. 30 weeks gestation of pregnancy  Tdap (age 6y and older) IM (Boostrix)    omeprazole (PRILOSEC) 20 MG delayed release capsule   2. Need for Tdap vaccination  Tdap (age 6y and older) IM (Boostrix)   3. Heartburn during pregnancy in third trimester  omeprazole (PRILOSEC) 20 MG delayed release capsule             I am having Pretty Tenorio start on omeprazole. I am also having her maintain her acetaminophen and Prenatal Vit-Fe Fumarate-FA (PRENATAL VITAMIN PO). Return in about 25 days (around 5/31/2021) for OB & 2 weeks ob gbs. Patient Instructions     Patient Education        Weeks 30 to 28 of Your Pregnancy: Care Instructions  Overview     You've made it to the final months of your pregnancy! By now your baby is really starting to look like a baby, with hair and plump skin.   As you enter the final weeks of pregnancy, the reality of having a baby may start to set in. This is a good time to set up a safe nursery and find quality  if needed. Doing this stuff ahead of time will allow you to focus on caring for and enjoying your new baby. You may also want to take a tour of your hospital's labor and delivery unit. This will help you get a better idea of what to expect while you're in the hospital.  During these last months, be sure to take good care of yourself. Pay attention to what your body needs. If your doctor says it's okay for you to work, don't push yourself too hard. If you haven't already had the Tdap shot during this pregnancy, talk to your doctor about getting it. It will help protect your  against pertussis infection. Follow-up care is a key part of your treatment and safety. Be sure to make and go to all appointments, and call your doctor if you are having problems. It's also a good idea to know your test results and keep a list of the medicines you take. How can you care for yourself at home? Pay attention to your baby's movements  · You should feel your baby move several times every day. · Your baby now turns less, and kicks and jabs more. · Your baby sleeps 20 to 45 minutes at a time and is more active at certain times of day. · If your doctor wants you to count your baby's kicks:  ? Empty your bladder, and lie on your side or relax in a comfortable chair. ? Write down your start time. ? Pay attention only to your baby's movements. Count any movement except hiccups. ? After you have counted 10 movements, write down your stop time. ? Write down how many minutes it took for your baby to move 10 times. ? If an hour goes by and you have not recorded 10 movements, have something to eat or drink and then count for another hour. If you do not record 10 movements in either hour, call your doctor. Ease heartburn  · Eat small, frequent meals. · Do not eat chocolate, peppermint, or very spicy foods. Avoid drinks with caffeine, such as coffee, tea, and sodas. · Avoid bending over or lying down after meals. · Talk a short walk after you eat. · If heartburn is a problem at night, do not eat for 2 hours before bedtime. · Take antacids like Mylanta, Maalox, Rolaids, or Tums. Do not take antacids that have sodium bicarbonate. Care for varicose veins  · Varicose veins are blood vessels that stretch out with the extra blood during pregnancy. Your legs may ache or throb. Most varicose veins will go away after the birth. · Avoid standing for long periods of time. Sit with your legs crossed at the ankles, not the knees. · Sit with your feet propped up. · Avoid tight clothing or stockings. Wear support hose. · Exercise regularly. Try walking for at least 30 minutes a day. Where can you learn more? Go to https://LifeBookpeSchool of Everything.Jintronix. org and sign in to your Vend account. Enter H402 in the Poq Studio box to learn more about \"Weeks 30 to 32 of Your Pregnancy: Care Instructions. \"     If you do not have an account, please click on the \"Sign Up Now\" link. Current as of: October 8, 2020               Content Version: 12.8  © 1615-1961 Healthwise, Incorporated. Care instructions adapted under license by [facility] If you have questions about a medical condition or this instruction, always ask your healthcare professional. [address] any warranty or liability for your use of this information.                    Diamante Dennison,5/6/2021 11:17 AM

## 2021-05-06 NOTE — PATIENT INSTRUCTIONS
Patient Education        Weeks 30 to 28 of Your Pregnancy: Care Instructions  Overview     You've made it to the final months of your pregnancy! By now your baby is really starting to look like a baby, with hair and plump skin. As you enter the final weeks of pregnancy, the reality of having a baby may start to set in. This is a good time to set up a safe nursery and find quality  if needed. Doing this stuff ahead of time will allow you to focus on caring for and enjoying your new baby. You may also want to take a tour of your hospital's labor and delivery unit. This will help you get a better idea of what to expect while you're in the hospital.  During these last months, be sure to take good care of yourself. Pay attention to what your body needs. If your doctor says it's okay for you to work, don't push yourself too hard. If you haven't already had the Tdap shot during this pregnancy, talk to your doctor about getting it. It will help protect your  against pertussis infection. Follow-up care is a key part of your treatment and safety. Be sure to make and go to all appointments, and call your doctor if you are having problems. It's also a good idea to know your test results and keep a list of the medicines you take. How can you care for yourself at home? Pay attention to your baby's movements  · You should feel your baby move several times every day. · Your baby now turns less, and kicks and jabs more. · Your baby sleeps 20 to 45 minutes at a time and is more active at certain times of day. · If your doctor wants you to count your baby's kicks:  ? Empty your bladder, and lie on your side or relax in a comfortable chair. ? Write down your start time. ? Pay attention only to your baby's movements. Count any movement except hiccups. ? After you have counted 10 movements, write down your stop time. ? Write down how many minutes it took for your baby to move 10 times.   ? If an hour goes by and you have not recorded 10 movements, have something to eat or drink and then count for another hour. If you do not record 10 movements in either hour, call your doctor. Ease heartburn  · Eat small, frequent meals. · Do not eat chocolate, peppermint, or very spicy foods. Avoid drinks with caffeine, such as coffee, tea, and sodas. · Avoid bending over or lying down after meals. · Talk a short walk after you eat. · If heartburn is a problem at night, do not eat for 2 hours before bedtime. · Take antacids like Mylanta, Maalox, Rolaids, or Tums. Do not take antacids that have sodium bicarbonate. Care for varicose veins  · Varicose veins are blood vessels that stretch out with the extra blood during pregnancy. Your legs may ache or throb. Most varicose veins will go away after the birth. · Avoid standing for long periods of time. Sit with your legs crossed at the ankles, not the knees. · Sit with your feet propped up. · Avoid tight clothing or stockings. Wear support hose. · Exercise regularly. Try walking for at least 30 minutes a day. Where can you learn more? Go to https://TaKaDu.ID90T. org and sign in to your Standardized Safety account. Enter U700 in the Appeon Corporation box to learn more about \"Weeks 30 to 32 of Your Pregnancy: Care Instructions. \"     If you do not have an account, please click on the \"Sign Up Now\" link. Current as of: October 8, 2020               Content Version: 12.8  © 2006-2021 [employer] Technologies. Care instructions adapted under license by [facility] ([facility]). If you have questions about a medical condition or this instruction, always ask your healthcare professional. [place] [address] any warranty or liability for your use of this information.

## 2021-05-17 ENCOUNTER — ROUTINE PRENATAL (OUTPATIENT)
Dept: OBGYN | Age: 26
End: 2021-05-17
Payer: MEDICARE

## 2021-05-17 VITALS — WEIGHT: 250 LBS | SYSTOLIC BLOOD PRESSURE: 128 MMHG | DIASTOLIC BLOOD PRESSURE: 78 MMHG | BODY MASS INDEX: 40.35 KG/M2

## 2021-05-17 DIAGNOSIS — R12 HEARTBURN DURING PREGNANCY IN THIRD TRIMESTER: ICD-10-CM

## 2021-05-17 DIAGNOSIS — Z3A.32 32 WEEKS GESTATION OF PREGNANCY: Primary | ICD-10-CM

## 2021-05-17 DIAGNOSIS — O26.893 HEARTBURN DURING PREGNANCY IN THIRD TRIMESTER: ICD-10-CM

## 2021-05-17 PROCEDURE — G8427 DOCREV CUR MEDS BY ELIG CLIN: HCPCS | Performed by: ADVANCED PRACTICE MIDWIFE

## 2021-05-17 PROCEDURE — 1036F TOBACCO NON-USER: CPT | Performed by: ADVANCED PRACTICE MIDWIFE

## 2021-05-17 PROCEDURE — G8417 CALC BMI ABV UP PARAM F/U: HCPCS | Performed by: ADVANCED PRACTICE MIDWIFE

## 2021-05-17 PROCEDURE — 99213 OFFICE O/P EST LOW 20 MIN: CPT | Performed by: ADVANCED PRACTICE MIDWIFE

## 2021-05-17 PROCEDURE — 99211 OFF/OP EST MAY X REQ PHY/QHP: CPT

## 2021-05-17 NOTE — PROGRESS NOTES
Kimberlee Morgan is here at 31w5d for:    Chief Complaint   Patient presents with    Routine Prenatal Visit     Patient notes some lower abdominal pressure and hip pain. She notes  that the pain has been tolerable without medication.          Estimated Due Date: Estimated Date of Delivery: 21    OB History    Para Term  AB Living   2 1 1     1   SAB TAB Ectopic Molar Multiple Live Births             1      # Outcome Date GA Lbr Poli/2nd Weight Sex Delivery Anes PTL Lv   2 Current            1 Term 14 40w0d  7 lb 6 oz (3.345 kg) F Vag-Spont  N NAYELI        Past Medical History:   Diagnosis Date    Deafness     Ganglion cyst of wrist     left    Hereditary nephropathy (Alport's) as achild     had renal biopsy    Irritable bowel syndrome     Kidney disease     Microscopic hematuria     Obesity     Proteinuria     Sensorineural hearing loss     Thin basement membrane disease     Bx proven       Past Surgical History:   Procedure Laterality Date    COLONOSCOPY      Dr. Gabriel Valderrama Left 2015    CYSTOSCOPY  2018    with stent placement    KIDNEY BIOPSY  98    LAPAROSCOPY      [place] CYSTO/URETERO W/LITHOTRIPSY &INDWELL STENT INSRT Right 2018    CYSTOSCOPY URETEROSCOPY LASER, HLL [place] [facility] STENT INSRT Right 2018    CYSTOSCOPY STENT INSERTION performed by Kaylee Solitario MD at [address] [place] [address] URETEROSCOPY  2015    WISDOM TOOTH EXTRACTION      WRIST SURGERY      cyst removal       Social History     Tobacco Use   Smoking Status Former Smoker    Types: Cigarettes    Quit date: 3/18/2014    Years since quittin.1   Smokeless Tobacco Never Used   Tobacco Comment    several cigarettes daily        Social History     Substance and Sexual Activity   Alcohol Use No    Alcohol/week: 0.0 standard drinks       No results found for this visit on 21. Vitals:  /78   Wt 250 lb (113.4 kg)   LMP 2020 (Exact Date)   BMI 40.35 kg/m²   Estimated body mass index is 40.35 kg/m² as calculated from the following:    Height as of 3/19/21: 5' 6\" (1.676 m). Weight as of this encounter: 250 lb (113.4 kg). HPI: Patient here today with mother and . Patient denies needs or concerns at this point time but states she does have some sore hips and lower pubic bone discomfort. Yes PT denies fever, chills, nausea and vomiting       Abdomen: enlarged, soft     Cervix:  not digitally examined    Results reviewed today:    No results found for this visit on 21. See prenatal vital sign section and fetal assessment section    ASSESSMENT & Plan    Diagnosis Orders   1. 32 weeks gestation of pregnancy     2. Heartburn during pregnancy in third trimester       We did review information for chiropractor in [facility]      I am having Michelle Hallman. Jona maintain her acetaminophen, Prenatal Vit-Fe Fumarate-FA (PRENATAL VITAMIN PO), and omeprazole. Return for start on  1 wk ob & 2 wk ob & 3 wk ob & 4 wk ob NST. Patient Instructions     Patient Education        Weeks 32 to 29 of Your Pregnancy: Care Instructions  Overview     During the last few weeks of your pregnancy, you may have more aches and pains. It's important to rest when you can. Your growing baby is putting more pressure on your bladder. So you may need to urinate more often. Hemorrhoids are also common. These are painful, itchy veins in the rectal area. You may want to talk with your doctor about banking your baby's umbilical cord blood. This is the blood left in the cord after birth. If you want to save this blood, you must arrange it ahead of time. You can't decide at the last minute. If you haven't already had the Tdap shot during this pregnancy, talk to your doctor about getting it. It will help protect your  against pertussis infection.   Follow-up care is a key part of your treatment and safety. Be sure to make and go to all appointments, and call your doctor if you are having problems. It's also a good idea to know your test results and keep a list of the medicines you take. How can you care for yourself at home? Ease hemorrhoids  · Get more liquids, fruits, vegetables, and fiber in your diet. This will help keep your stools soft. · Avoid sitting for too long. Lie on your left side several times a day. · Clean yourself with soft, moist toilet paper. Or you can use witch hazel pads or personal hygiene pads. · If you are uncomfortable, try ice packs. Or you can sit in a warm sitz bath. Do these for 20 minutes at a time, as needed. · Use hydrocortisone cream for pain and itching. Two examples are Anusol and Preparation H Hydrocortisone. · Ask your doctor about taking an over-the-counter stool softener. Consider breastfeeding  · Experts recommend that women breastfeed for 1 year or longer. · Breast milk may help protect your child from some health problems.  babies are less likely than formula-fed babies to:  ? Get ear infections, colds, diarrhea, and pneumonia. ? Be obese or get diabetes later in life. · Women who breastfeed have less bleeding after the birth. Their uteruses also shrink back faster. · Some women who breastfeed lose weight faster. Making milk burns calories. · Breastfeeding can lower your risk of breast cancer, ovarian cancer, and osteoporosis. Decide about circumcision for boys  · As you make this decision, it may help to think about your personal, Buddhism, and family traditions. You get to decide if you will keep your son's penis natural or if he will be circumcised. · If you decide that you would like to have your baby circumcised, talk with your doctor. You can share your concerns about pain. And you can discuss your preferences for anesthesia. Where can you learn more? Go to https://rosalinaeb.health-partners. org and sign in to your Foody account. Enter X922 in the [facility] box to learn more about \"Weeks 32 to 34 of Your Pregnancy: Care Instructions. \"     If you do not have an account, please click on the \"Sign Up Now\" link. Current as of: October 8, 2020               Content Version: 12.8  © 2006-2021 Healthwise, Streamfile. Care instructions adapted under license by [facility] ([facility]). If you have questions about a medical condition or this instruction, always ask your healthcare professional. [address] any warranty or liability for your use of this information.                    KEERTHI Corona CNM,5/17/2021 11:24 AM

## 2021-05-17 NOTE — PATIENT INSTRUCTIONS
pneumonia. ? Be obese or get diabetes later in life. · Women who breastfeed have less bleeding after the birth. Their uteruses also shrink back faster. · Some women who breastfeed lose weight faster. Making milk burns calories. · Breastfeeding can lower your risk of breast cancer, ovarian cancer, and osteoporosis. Decide about circumcision for boys  · As you make this decision, it may help to think about your personal, Restoration, and family traditions. You get to decide if you will keep your son's penis natural or if he will be circumcised. · If you decide that you would like to have your baby circumcised, talk with your doctor. You can share your concerns about pain. And you can discuss your preferences for anesthesia. Where can you learn more? Go to https://ComSense TechnologypeBilneureb.Bloc. org and sign in to your "Pictage[employer] Inc." account. Enter G473 in the SnapRetail box to learn more about \"Weeks 32 to 34 of Your Pregnancy: Care Instructions. \"     If you do not have an account, please click on the \"Sign Up Now\" link. Current as of: October 8, 2020               Content Version: 12.8  © 9590-2591 Healthwise, Incorporated. Care instructions adapted under license by [facility] ([facility]). If you have questions about a medical condition or this instruction, always ask your healthcare professional. [address]  any warranty or liability for your use of this information.

## 2021-06-02 ENCOUNTER — ROUTINE PRENATAL (OUTPATIENT)
Dept: OBGYN | Age: 26
End: 2021-06-02
Payer: MEDICARE

## 2021-06-02 VITALS — SYSTOLIC BLOOD PRESSURE: 128 MMHG | DIASTOLIC BLOOD PRESSURE: 86 MMHG | WEIGHT: 257 LBS | BODY MASS INDEX: 41.48 KG/M2

## 2021-06-02 DIAGNOSIS — R12 HEARTBURN DURING PREGNANCY IN THIRD TRIMESTER: ICD-10-CM

## 2021-06-02 DIAGNOSIS — O26.893 HEARTBURN DURING PREGNANCY IN THIRD TRIMESTER: ICD-10-CM

## 2021-06-02 DIAGNOSIS — Z3A.34 34 WEEKS GESTATION OF PREGNANCY: Primary | ICD-10-CM

## 2021-06-02 PROCEDURE — 0502F SUBSEQUENT PRENATAL CARE: CPT | Performed by: ADVANCED PRACTICE MIDWIFE

## 2021-06-02 PROCEDURE — G8417 CALC BMI ABV UP PARAM F/U: HCPCS | Performed by: ADVANCED PRACTICE MIDWIFE

## 2021-06-02 PROCEDURE — 1036F TOBACCO NON-USER: CPT | Performed by: ADVANCED PRACTICE MIDWIFE

## 2021-06-02 PROCEDURE — G8427 DOCREV CUR MEDS BY ELIG CLIN: HCPCS | Performed by: ADVANCED PRACTICE MIDWIFE

## 2021-06-02 NOTE — PATIENT INSTRUCTIONS
lower body numb so that you won't feel pain. · Be sure to tell your doctor about your pain medicine choice before you start labor or very early in your labor. You may be able to change your mind as labor progresses. · Rarely, a woman is put to sleep by medicine given through a mask or an IV. Labor and delivery  · The first stage of labor has three parts: early, active, and transition. ? Most women have early labor at home. You can stay busy or rest, eat light snacks, drink clear fluids, and start counting contractions. ? When talking during a contraction gets hard, you may be moving to active labor. During active labor, you should head for the hospital if you are not there already. ? You are in active labor when contractions come every 3 to 4 minutes and last about 60 seconds. Your cervix is opening more rapidly. ? If your water breaks, contractions will come faster and stronger. ? During transition, your cervix is stretching, and contractions are coming more rapidly. ? You may want to push, but your cervix might not be ready. Your doctor will tell you when to push. · The second stage starts when your cervix is completely opened and you are ready to push. ? Contractions are very strong to push the baby down the birth canal.  ? You will feel the urge to push. You may feel like you need to have a bowel movement. ? You may be coached to push with contractions. These contractions will be very strong, but you will not have them as often. You can get a little rest between contractions. ? You may be emotional and irritable. You may not be aware of what is going on around you.  ? One last push, and your baby is born. · The third stage is when a few more contractions push out the placenta. This may take 30 minutes or less. · The fourth stage is the welcome recovery. You may feel overwhelmed with emotions and exhausted but alert. This is a good time to start breastfeeding. Where can you learn more?   Go to https://chpepiceweb.healthBarBird. org and sign in to your SkyPilot Networkst account. Enter P798 in the [facility] box to learn more about \"Weeks 34 to 36 of Your Pregnancy: Care Instructions. \"     If you do not have an account, please click on the \"Sign Up Now\" link. Current as of: October 8, 2020               Content Version: 12.8  © 2006-2021 HealthStarr, Incorporated. Care instructions adapted under license by [facility] ([facility]). If you have questions about a medical condition or this instruction, always ask your healthcare professional. [place] [address] any warranty or liability for your use of this information.

## 2021-06-02 NOTE — PROGRESS NOTES
Matt Mcnally is here at 34w0d for:    Chief Complaint   Patient presents with    Routine Prenatal Visit     Patient is feeling well. She had some contractions a few nights ago for roughly 3 hours but they stopped. She was also concerned because her baby had hiccups the other day and she saw on google that it mean there could be a problem with the baby. She states that heartburn is under good control with Prilosec. She called the chiropractor and they could not schedule her until Aug. for her hip pain. Any other options?        Estimated Due Date: Estimated Date of Delivery: 21    OB History    Para Term  AB Living   2 1 1     1   SAB TAB Ectopic Molar Multiple Live Births             1      # Outcome Date GA Lbr Poli/2nd Weight Sex Delivery Anes PTL Lv   2 Current            1 Term 14 40w0d  7 lb 6 oz (3.345 kg) F Vag-Spont  N NAYELI        Past Medical History:   Diagnosis Date    Deafness     Ganglion cyst of wrist     left    Hereditary nephropathy (Alport's) as achild     had renal biopsy    Irritable bowel syndrome     Kidney disease     Microscopic hematuria     Obesity     Proteinuria     Sensorineural hearing loss     Thin basement membrane disease     Bx proven       Past Surgical History:   Procedure Laterality Date    COLONOSCOPY      Dr. Rosmery Beckman Left 2015    CYSTOSCOPY  2018    with stent placement    KIDNEY BIOPSY  98    LAPAROSCOPY      [place] CYSTO/URETERO W/LITHOTRIPSY &INDWELL STENT INSRT Right 2018    CYSTOSCOPY URETEROSCOPY LASER, HLL performed by Violet Tinsley MD at [facility] &INDWELL STENT INSRT Right 2018    CYSTOSCOPY STENT INSERTION performed by Violet Tinsley MD at [address] [place] [address] URETEROSCOPY  2015    WISDOM TOOTH EXTRACTION      WRIST SURGERY      cyst removal       Social History     Tobacco Use   Smoking Status Former Smoker    Types: Cigarettes  Quit date: 3/18/2014    Years since quittin.2   Smokeless Tobacco Never Used   Tobacco Comment    several cigarettes daily        Social History     Substance and Sexual Activity   Alcohol Use No    Alcohol/week: 0.0 standard drinks       No results found for this visit on 21. Vitals:  /86   Wt 257 lb (116.6 kg)   LMP 2020 (Exact Date)   BMI 41.48 kg/m²   Estimated body mass index is 41.48 kg/m² as calculated from the following:    Height as of 3/19/21: 5' 6\" (1.676 m). Weight as of this encounter: 257 lb (116.6 kg). HPI: Patient here today for 34-week OB care with . Patient is worried about hiccups with baby but states good fetal movement    Yes PT denies fever, chills, nausea and vomiting       Abdomen: enlarged, soft     Cervix:  not digitally examined    Results reviewed today:    No results found for this visit on 21. See prenatal vital sign section and fetal assessment section    ASSESSMENT & Plan    Diagnosis Orders   1. 34 weeks gestation of pregnancy     2. Heartburn during pregnancy in third trimester               I am having Pretty Tenorio maintain her acetaminophen, Prenatal Vit-Fe Fumarate-FA (PRENATAL VITAMIN PO), and omeprazole. Return in about 2 weeks (around 2021) for Keep next appt. Patient Instructions     Patient Education        Weeks 34 to 39 of Your Pregnancy: Care Instructions  Overview     By now, your baby and your belly have grown quite large. It's almost time to give birth! Your baby's lungs are almost ready to breathe air. The skull bones are firm enough to protect your baby's head, but soft enough to move down through the birth canal.  You may be feeling excited and happy at times--but also anxious or scared. You might wonder how you'll know if you're in labor or what to expect during labor. Try to be open and flexible in your expectations of the birth.  Because each birth is different, there's no way to know exactly what childbirth will be like for you. Talk to your doctor or midwife about any concerns you have. If you haven't already had the Tdap shot during this pregnancy, talk to your doctor about getting it. It will help protect your  against pertussis infection. In the 36th week, most women have a test for group B streptococcus (GBS). GBS is a common bacteria that can live in the vagina and rectum. It can make your baby sick after birth. If you test positive, you will get antibiotics during labor. The medicine will help keep your baby from getting the bacteria. Follow-up care is a key part of your treatment and safety. Be sure to make and go to all appointments, and call your doctor if you are having problems. It's also a good idea to know your test results and keep a list of the medicines you take. How can you care for yourself at home? Learn about pain relief choices  · Pain is different for every woman. Talk with your doctor about your feelings about pain. · You can choose from several types of pain relief. These include medicine or breathing techniques, as well as comfort measures. You can use more than one option. · If you choose to have pain medicine during labor, talk to your doctor about your options. Some medicines lower anxiety and help with some of the pain. Others make your lower body numb so that you won't feel pain. · Be sure to tell your doctor about your pain medicine choice before you start labor or very early in your labor. You may be able to change your mind as labor progresses. · Rarely, a woman is put to sleep by medicine given through a mask or an IV. Labor and delivery  · The first stage of labor has three parts: early, active, and transition. ? Most women have early labor at home. You can stay busy or rest, eat light snacks, drink clear fluids, and start counting contractions. ? When talking during a contraction gets hard, you may be moving to active labor.  During active labor, you should head for the hospital if you are not there already. ? You are in active labor when contractions come every 3 to 4 minutes and last about 60 seconds. Your cervix is opening more rapidly. ? If your water breaks, contractions will come faster and stronger. ? During transition, your cervix is stretching, and contractions are coming more rapidly. ? You may want to push, but your cervix might not be ready. Your doctor will tell you when to push. · The second stage starts when your cervix is completely opened and you are ready to push. ? Contractions are very strong to push the baby down the birth canal.  ? You will feel the urge to push. You may feel like you need to have a bowel movement. ? You may be coached to push with contractions. These contractions will be very strong, but you will not have them as often. You can get a little rest between contractions. ? You may be emotional and irritable. You may not be aware of what is going on around you.  ? One last push, and your baby is born. · The third stage is when a few more contractions push out the placenta. This may take 30 minutes or less. · The fourth stage is the welcome recovery. You may feel overwhelmed with emotions and exhausted but alert. This is a good time to start breastfeeding. Where can you learn more? Go to https://Figma.ApexPeak. org and sign in to your Nayatek account. Enter V655 in the [employer] box to learn more about \"Weeks 34 to 36 of Your Pregnancy: Care Instructions. \"     If you do not have an account, please click on the \"Sign Up Now\" link. Current as of: October 8, 2020               Content Version: 12.8  © 4253-0762 Healthwise, Incorporated. Care instructions adapted under license by [facility] ([facility]).  If you have questions about a medical condition or this instruction, always ask your healthcare professional. [address] any warranty or liability for your use of this information.                    KEERTHI Diehl - JEFF,6/2/2021 5:50 PM

## 2021-06-14 ENCOUNTER — HOSPITAL ENCOUNTER (OUTPATIENT)
Age: 26
Discharge: HOME | End: 2021-06-14
Attending: ADVANCED PRACTICE MIDWIFE | Admitting: ADVANCED PRACTICE MIDWIFE
Payer: MEDICARE

## 2021-06-14 VITALS
HEART RATE: 96 BPM | WEIGHT: 257 LBS | TEMPERATURE: 98.1 F | SYSTOLIC BLOOD PRESSURE: 143 MMHG | DIASTOLIC BLOOD PRESSURE: 91 MMHG | BODY MASS INDEX: 41.3 KG/M2 | RESPIRATION RATE: 18 BRPM | HEIGHT: 66 IN

## 2021-06-14 LAB
-: ABNORMAL
ABSOLUTE EOS #: 0.1 K/UL (ref 0–0.44)
ABSOLUTE IMMATURE GRANULOCYTE: 0.1 K/UL (ref 0–0.3)
ABSOLUTE LYMPH #: 1.84 K/UL (ref 1.1–3.7)
ABSOLUTE MONO #: 0.81 K/UL (ref 0.1–1.2)
ALBUMIN SERPL-MCNC: 3.3 G/DL (ref 3.5–5.2)
ALBUMIN/GLOBULIN RATIO: 1.1 (ref 1–2.5)
ALP BLD-CCNC: 166 U/L (ref 35–104)
ALT SERPL-CCNC: 20 U/L (ref 5–33)
AMORPHOUS: ABNORMAL
ANION GAP SERPL CALCULATED.3IONS-SCNC: 13 MMOL/L (ref 9–17)
AST SERPL-CCNC: 22 U/L
BACTERIA: ABNORMAL
BASOPHILS # BLD: 0 % (ref 0–2)
BASOPHILS ABSOLUTE: <0.03 K/UL (ref 0–0.2)
BILIRUB SERPL-MCNC: 0.24 MG/DL (ref 0.3–1.2)
BILIRUBIN URINE: NEGATIVE
BUN BLDV-MCNC: 8 MG/DL (ref 6–20)
BUN/CREAT BLD: 14 (ref 9–20)
CALCIUM SERPL-MCNC: 9 MG/DL (ref 8.6–10.4)
CASTS UA: ABNORMAL /LPF
CHLORIDE BLD-SCNC: 99 MMOL/L (ref 98–107)
CO2: 20 MMOL/L (ref 20–31)
COLOR: YELLOW
COMMENT UA: ABNORMAL
CREAT SERPL-MCNC: 0.56 MG/DL (ref 0.5–0.9)
CREATININE URINE: 281.6 MG/DL (ref 28–217)
CRYSTALS, UA: ABNORMAL /HPF
CRYSTALS, UA: ABNORMAL /HPF
DIFFERENTIAL TYPE: ABNORMAL
EOSINOPHILS RELATIVE PERCENT: 1 % (ref 1–4)
EPITHELIAL CELLS UA: ABNORMAL /HPF (ref 0–25)
GFR AFRICAN AMERICAN: >60 ML/MIN
GFR NON-AFRICAN AMERICAN: >60 ML/MIN
GFR SERPL CREATININE-BSD FRML MDRD: ABNORMAL ML/MIN/{1.73_M2}
GFR SERPL CREATININE-BSD FRML MDRD: ABNORMAL ML/MIN/{1.73_M2}
GLUCOSE BLD-MCNC: 82 MG/DL (ref 70–99)
GLUCOSE URINE: NEGATIVE
HCT VFR BLD CALC: 33 % (ref 36.3–47.1)
HEMOGLOBIN: 11.1 G/DL (ref 11.9–15.1)
IMMATURE GRANULOCYTES: 1 %
KETONES, URINE: NEGATIVE
LACTATE DEHYDROGENASE: 164 U/L (ref 135–214)
LEUKOCYTE ESTERASE, URINE: ABNORMAL
LYMPHOCYTES # BLD: 18 % (ref 24–43)
MCH RBC QN AUTO: 30.6 PG (ref 25.2–33.5)
MCHC RBC AUTO-ENTMCNC: 33.6 G/DL (ref 28.4–34.8)
MCV RBC AUTO: 90.9 FL (ref 82.6–102.9)
MONOCYTES # BLD: 8 % (ref 3–12)
MUCUS: ABNORMAL
NITRITE, URINE: NEGATIVE
NRBC AUTOMATED: 0 PER 100 WBC
OTHER OBSERVATIONS UA: ABNORMAL
PDW BLD-RTO: 12.4 % (ref 11.8–14.4)
PH UA: 6 (ref 5–9)
PLATELET # BLD: 244 K/UL (ref 138–453)
PLATELET ESTIMATE: ABNORMAL
PMV BLD AUTO: 10.1 FL (ref 8.1–13.5)
POTASSIUM SERPL-SCNC: 3.7 MMOL/L (ref 3.7–5.3)
PROTEIN UA: ABNORMAL
RBC # BLD: 3.63 M/UL (ref 3.95–5.11)
RBC # BLD: ABNORMAL 10*6/UL
RBC UA: ABNORMAL /HPF (ref 0–2)
RENAL EPITHELIAL, UA: ABNORMAL /HPF
SEG NEUTROPHILS: 72 % (ref 36–65)
SEGMENTED NEUTROPHILS ABSOLUTE COUNT: 7.27 K/UL (ref 1.5–8.1)
SODIUM BLD-SCNC: 132 MMOL/L (ref 135–144)
SPECIFIC GRAVITY UA: >1.03 (ref 1.01–1.02)
TOTAL PROTEIN, URINE: 34 MG/DL
TOTAL PROTEIN: 6.2 G/DL (ref 6.4–8.3)
TRICHOMONAS: ABNORMAL
TURBIDITY: CLEAR
URIC ACID: 4.1 MG/DL (ref 2.4–5.7)
URINE HGB: ABNORMAL
URINE TOTAL PROTEIN CREATININE RATIO: 0.12 (ref 0–0.2)
UROBILINOGEN, URINE: NORMAL
WBC # BLD: 10.1 K/UL (ref 3.5–11.3)
WBC # BLD: ABNORMAL 10*3/UL
WBC UA: ABNORMAL /HPF (ref 0–5)
YEAST: ABNORMAL

## 2021-06-14 PROCEDURE — 87086 URINE CULTURE/COLONY COUNT: CPT

## 2021-06-14 PROCEDURE — 84156 ASSAY OF PROTEIN URINE: CPT

## 2021-06-14 PROCEDURE — 85025 COMPLETE CBC W/AUTO DIFF WBC: CPT

## 2021-06-14 PROCEDURE — 82570 ASSAY OF URINE CREATININE: CPT

## 2021-06-14 PROCEDURE — 83615 LACTATE (LD) (LDH) ENZYME: CPT

## 2021-06-14 PROCEDURE — 80053 COMPREHEN METABOLIC PANEL: CPT

## 2021-06-14 PROCEDURE — 81001 URINALYSIS AUTO W/SCOPE: CPT

## 2021-06-14 PROCEDURE — 6370000000 HC RX 637 (ALT 250 FOR IP): Performed by: ADVANCED PRACTICE MIDWIFE

## 2021-06-14 PROCEDURE — 84550 ASSAY OF BLOOD/URIC ACID: CPT

## 2021-06-14 PROCEDURE — 36415 COLL VENOUS BLD VENIPUNCTURE: CPT

## 2021-06-14 PROCEDURE — 99215 OFFICE O/P EST HI 40 MIN: CPT

## 2021-06-14 RX ORDER — LABETALOL 100 MG/1
100 TABLET, FILM COATED ORAL ONCE
Status: COMPLETED | OUTPATIENT
Start: 2021-06-14 | End: 2021-06-14

## 2021-06-14 RX ADMIN — LABETALOL HYDROCHLORIDE 100 MG: 100 TABLET, FILM COATED ORAL at 22:02

## 2021-06-14 NOTE — FLOWSHEET NOTE
Timothy Nur cnm called et notified of pt et co that brought her in to hospital. Notified of pt history et current amiosure negative, sve, b/p, reactive efm. States to keep pt for a couple hours and recheck her cervix. bp q 15 min.

## 2021-06-14 NOTE — PROGRESS NOTES
Rn at bedside to introduce self to patient. Cliff Hagen #[id number]  was dialed  At [facility] to communicate. Pt  Stated she was feeling better and not having any contraction pain. Pt was notified a SVE would be performed in one hour and she no longer had to hold the NST button. Call bell in reach and bedside table with personal belongings near patient.

## 2021-06-14 NOTE — FLOWSHEET NOTE
Pt to ob unit  utilized #[id number] pt states that she felt like she leaked fluid last night and then again today. States that she has had ctx on and off since yesterday. Plan of care discussed. Pt states understanding.

## 2021-06-15 RX ORDER — LABETALOL 100 MG/1
100 TABLET, FILM COATED ORAL 2 TIMES DAILY
Qty: 60 TABLET | Refills: 1 | Status: SHIPPED | OUTPATIENT
Start: 2021-06-15 | End: 2022-03-29

## 2021-06-15 NOTE — PROGRESS NOTES
LUIS Carver [facility] called with lab results. CN states to call back with protein/creatine ratio when resulted. Lab called to verify order. Lab is running test now.

## 2021-06-15 NOTE — PROGRESS NOTES
Catrina Gosselin CNM updated regarding protein/creatine ratio. Result was WNL per CNM . CNM updated on patient blood pressures and  CNM ordered 100 mg labetalol PO now. CNM states toRecheck BP in 30 minutes, call with result and update patient that we ruled out pre-eclampsia at this time.

## 2021-06-15 NOTE — PROGRESS NOTES
Pt was provided with discharge instructions using  [facility] #[id number]. Pt was instructed to keep follow up appt with Allison Wilkins on 6-17, educated on new prescription of labetalol that she will need to  after 10 am tomorrow 6-15. Pt was educated on side effects and instructed to call Anisha Franco if she begins to feel dizzy, weak, light headed from the medication. Pt was also instructed to perform activities as tolerated, avoid heavy lifting, avoid sex, drink fluids, sleep on side to side and to call CNM  If she feels more than 4 contractions in an hour, her water breaks, has vaginal bleeding, or decreased fetal movement. Pt verbalized understanding of discharge instructions and did not have any questions regarding information. Pt ambulated self off the unit and was driven home by a friend.

## 2021-06-16 LAB
CULTURE: NORMAL
Lab: NORMAL
SPECIMEN DESCRIPTION: NORMAL

## 2021-06-17 ENCOUNTER — HOSPITAL ENCOUNTER (OUTPATIENT)
Age: 26
Setting detail: SPECIMEN
Discharge: HOME | End: 2021-06-17
Payer: MEDICARE

## 2021-06-17 ENCOUNTER — ROUTINE PRENATAL (OUTPATIENT)
Dept: OBGYN | Age: 26
End: 2021-06-17
Payer: MEDICARE

## 2021-06-17 VITALS — WEIGHT: 259 LBS | SYSTOLIC BLOOD PRESSURE: 110 MMHG | DIASTOLIC BLOOD PRESSURE: 78 MMHG | BODY MASS INDEX: 41.8 KG/M2

## 2021-06-17 DIAGNOSIS — Z3A.36 36 WEEKS GESTATION OF PREGNANCY: ICD-10-CM

## 2021-06-17 DIAGNOSIS — Z3A.36 36 WEEKS GESTATION OF PREGNANCY: Primary | ICD-10-CM

## 2021-06-17 PROCEDURE — 0502F SUBSEQUENT PRENATAL CARE: CPT | Performed by: ADVANCED PRACTICE MIDWIFE

## 2021-06-17 PROCEDURE — G8417 CALC BMI ABV UP PARAM F/U: HCPCS | Performed by: ADVANCED PRACTICE MIDWIFE

## 2021-06-17 PROCEDURE — 1036F TOBACCO NON-USER: CPT | Performed by: ADVANCED PRACTICE MIDWIFE

## 2021-06-17 PROCEDURE — G8427 DOCREV CUR MEDS BY ELIG CLIN: HCPCS | Performed by: ADVANCED PRACTICE MIDWIFE

## 2021-06-17 PROCEDURE — 87081 CULTURE SCREEN ONLY: CPT

## 2021-06-17 NOTE — PROGRESS NOTES
Antonina Valdez is here at 36w1d for:    Chief Complaint   Patient presents with    Routine Prenatal Visit     GBS. pt states she has been ok but she been feeling lightheaded on and off. pt states she has not had any contractions since monday.         Estimated Due Date: Estimated Date of Delivery: 21    OB History    Para Term  AB Living   2 1 1     1   SAB TAB Ectopic Molar Multiple Live Births             1      # Outcome Date GA Lbr Poli/2nd Weight Sex Delivery Anes PTL Lv   2 Current            1 Term 14 40w0d  7 lb 6 oz (3.345 kg) F Vag-Spont  N NAYELI        Past Medical History:   Diagnosis Date    Deafness     Ganglion cyst of wrist     left    Hereditary nephropathy (Alport's) as achild     had renal biopsy    Irritable bowel syndrome     Kidney disease     Microscopic hematuria     Obesity     Proteinuria     Sensorineural hearing loss     Thin basement membrane disease     Bx proven       Past Surgical History:   Procedure Laterality Date    COLONOSCOPY      Dr. Natalia Neri Left 2015    CYSTOSCOPY  2018    with stent placement    KIDNEY BIOPSY  98    LAPAROSCOPY      [place] CYSTO/URETERO W/LITHOTRIPSY &INDWELL STENT INSRT Right 2018    CYSTOSCOPY URETEROSCOPY LASER, HLL performed by Yordan M[facility]dez MD at [facility] &INDWELL STENT INSRT Right 2018    CYSTOSCOPY STENT INSERTION performed by Yordan Méndez MD at [address] [place] [address] URETEROSCOPY  2015    WISDOM TOOTH EXTRACTION      WRIST SURGERY      cyst removal       Social History     Tobacco Use   Smoking Status Former Smoker    Types: Cigarettes    Quit date: 3/18/2014    Years since quittin.2   Smokeless Tobacco Never Used   Tobacco Comment    several cigarettes daily        Social History     Substance and Sexual Activity   Alcohol Use No    Alcohol/week: 0.0 standard drinks       No results found for this visit on 06/17/21. HPI: Patient doing well today. Patient states she is taking her blood pressure medicine and feels a little lightheaded. I did review with her how the medication is bringing her blood pressures down to a normal level we also reviewed all lab work completed    Yes PT denies fever, chills, nausea and vomiting       Vitals:  Estimated body mass index is 41.8 kg/m² as calculated from the following:    Height as of 6/14/21: 5' 6\" (1.676 m). Weight as of this encounter: 259 lb (117.5 kg). BP: 110/78  Weight: 259 lb (117.5 kg)  Patient Position: Sitting  Albumin: Negative  Glucose: Negative  Fundal Height (cm): 36 cm  Fetal Heart Rate: 148  Movement: Present  Presentation: Vertex  Dilation (cm): 2  Effacement (%): 50        Results reviewed today:    No results found for this visit on 06/17/21. See prenatal vital sign section and fetal assessment section    ASSESSMENT & Plan    Diagnosis Orders   1. 36 weeks gestation of pregnancy  Culture, Strep B Screen, Vaginal/Rectal     Will plan induction at 39 weeks for gestational hypertension  Phone number given to Rudy Mayer to set up interpretation services for this today        I am having Pretty Tenorio maintain her acetaminophen, Prenatal Vit-Fe Fumarate-FA (PRENATAL VITAMIN PO), omeprazole, and labetalol. Return for Keep next appt. There are no Patient Instructions on file for this visit.           KEERTHI Rodriguez CNM,6/17/2021 11:54 AM

## 2021-06-20 LAB
CULTURE: NORMAL
Lab: NORMAL
SPECIMEN DESCRIPTION: NORMAL

## 2021-06-24 ENCOUNTER — ROUTINE PRENATAL (OUTPATIENT)
Dept: OBGYN | Age: 26
End: 2021-06-24
Payer: MEDICARE

## 2021-06-24 VITALS — WEIGHT: 261 LBS | SYSTOLIC BLOOD PRESSURE: 126 MMHG | BODY MASS INDEX: 42.13 KG/M2 | DIASTOLIC BLOOD PRESSURE: 80 MMHG

## 2021-06-24 DIAGNOSIS — Z34.93 PRENATAL CARE IN THIRD TRIMESTER: Primary | ICD-10-CM

## 2021-06-24 PROCEDURE — 0502F SUBSEQUENT PRENATAL CARE: CPT | Performed by: OBSTETRICS & GYNECOLOGY

## 2021-06-24 PROCEDURE — 99211 OFF/OP EST MAY X REQ PHY/QHP: CPT | Performed by: OBSTETRICS & GYNECOLOGY

## 2021-06-24 NOTE — PROGRESS NOTES
The patient states she is having a lot of pressure but does not want a cervical check today. She states she is having good fetal movement but very few contractions.

## 2021-06-29 ENCOUNTER — ROUTINE PRENATAL (OUTPATIENT)
Dept: OBGYN | Age: 26
End: 2021-06-29
Payer: MEDICARE

## 2021-06-29 VITALS — BODY MASS INDEX: 42.61 KG/M2 | WEIGHT: 264 LBS | DIASTOLIC BLOOD PRESSURE: 88 MMHG | SYSTOLIC BLOOD PRESSURE: 138 MMHG

## 2021-06-29 DIAGNOSIS — Z3A.37 37 WEEKS GESTATION OF PREGNANCY: ICD-10-CM

## 2021-06-29 DIAGNOSIS — Z34.93 PRENATAL CARE IN THIRD TRIMESTER: Primary | ICD-10-CM

## 2021-06-29 PROCEDURE — 1036F TOBACCO NON-USER: CPT | Performed by: ADVANCED PRACTICE MIDWIFE

## 2021-06-29 PROCEDURE — G8417 CALC BMI ABV UP PARAM F/U: HCPCS | Performed by: ADVANCED PRACTICE MIDWIFE

## 2021-06-29 PROCEDURE — 99213 OFFICE O/P EST LOW 20 MIN: CPT | Performed by: ADVANCED PRACTICE MIDWIFE

## 2021-06-29 PROCEDURE — G8427 DOCREV CUR MEDS BY ELIG CLIN: HCPCS | Performed by: ADVANCED PRACTICE MIDWIFE

## 2021-06-29 ASSESSMENT — PATIENT HEALTH QUESTIONNAIRE - PHQ9
2. FEELING DOWN, DEPRESSED OR HOPELESS: 1
SUM OF ALL RESPONSES TO PHQ9 QUESTIONS 1 & 2: 2
SUM OF ALL RESPONSES TO PHQ QUESTIONS 1-9: 2
1. LITTLE INTEREST OR PLEASURE IN DOING THINGS: 1
SUM OF ALL RESPONSES TO PHQ QUESTIONS 1-9: 2
SUM OF ALL RESPONSES TO PHQ QUESTIONS 1-9: 2

## 2021-06-29 NOTE — PATIENT INSTRUCTIONS
Patient Education        Week 37 of Your Pregnancy: Care Instructions  Your Care Instructions     You are near the end of your pregnancy--and you're probably pretty uncomfortable. It may be harder to walk around. Lying down probably isn't comfortable either. You may have trouble getting to sleep or staying asleep. Most women deliver their babies between 40 and 41 weeks. This is a good time to think about packing a bag for the hospital with items you'll need. Then you'll be ready when labor starts. Follow-up care is a key part of your treatment and safety. Be sure to make and go to all appointments, and call your doctor if you are having problems. It's also a good idea to know your test results and keep a list of the medicines you take. How can you care for yourself at home? Learn about breastfeeding  · Breastfeeding is best for your baby and good for you. · Breast milk has antibodies to help your baby fight infections. · Mothers who breastfeed often lose weight faster, because making milk burns calories. · Learning the best ways to hold your baby will make breastfeeding easier. · Let your partner bathe and diaper the baby to keep your partner from feeling left out. Snuggle together when you breastfeed. · You may want to learn how to use a breast pump and store your milk. · If you choose to bottle feed, make the feeding feel like breastfeeding so you can bond with your baby. Always hold your baby and the bottle. Do not prop bottles or let your baby fall asleep with a bottle. Learn about crying  · It is common for babies to cry for 1 to 3 hours a day. Some cry more, some cry less. · Babies don't cry to make you upset or because you are a bad parent. · Crying is how your baby communicates. Your baby may be hungry; have gas; need a diaper change; or feel cold, warm, tired, lonely, or tense. Sometimes babies cry for unknown reasons.   · If you respond to your baby's needs, he or she will learn to trust you.  · Try to stay calm when your baby cries. Your baby may get more upset if he or she senses that you are upset. Know how to care for your   · Your baby's umbilical cord stump will drop off on its own, usually between 1 and 2 weeks. To care for your baby's umbilical cord area:  ? Clean the area at the bottom of the cord 2 or 3 times a day. ? Pay special attention to the area where the cord attaches to the skin. ? Keep the diaper folded below the cord. ? Use a damp washcloth or cotton ball to sponge bathe your baby until the stump has come off. · Your baby's first dark stool is called meconium. After the meconium is passed, your baby will develop his or her own bowel pattern. ? Some babies, especially  babies, have several bowel movements a day. Others have one or two a day, or one every 2 to 3 days. ?  babies often have loose, yellow stools. Formula-fed babies have more formed stools. ? If your baby's stools look like little pellets, he or she is constipated. After 2 days of constipation, call your baby's doctor. · If your baby will be circumcised, you can care for him at home. ? Gently rinse his penis with warm water after every diaper change. Do not try to remove the film that forms on the penis. This film will go away on its own. Pat dry. ? Put petroleum ointment, such as Vaseline, on the area of the diaper that will touch your baby's penis. This will keep the diaper from sticking to your baby. ? Ask the doctor about giving your baby acetaminophen (Tylenol) for pain. Where can you learn more? Go to https://chmalaeb.healthGreencloud Technologies. org and sign in to your Two Tap account. Enter 68 [phone] [id number] in the [facility] box to learn more about \"Week 37 of Your Pregnancy: Care Instructions. \"     If you do not have an account, please click on the \"Sign Up Now\" link. Current as of: 2020               Content Version: 12.9  © 6065-9825 Healthwise, [employer]. Care instructions adapted under license by [facility] ([facility]). If you have questions about a medical condition or this instruction, always ask your healthcare professional. [address] any warranty or liability for your use of this information.

## 2021-06-29 NOTE — PROGRESS NOTES
Axel Long is here at 37w6d for:    Chief Complaint   Patient presents with    Routine Prenatal Visit     SVE. pt states her father passed away 2 days ago. so she has been depressed        Estimated Due Date: Estimated Date of Delivery: 21    OB History    Para Term  AB Living   2 1 1     1   SAB TAB Ectopic Molar Multiple Live Births             1      # Outcome Date GA Lbr Poli/2nd Weight Sex Delivery Anes PTL Lv   2 Current            1 Term 14 40w0d  7 lb 6 oz (3.345 kg) F Vag-Spont  N NAYELI        Past Medical History:   Diagnosis Date    Deafness     Ganglion cyst of wrist     left    Hereditary nephropathy (Alport's) as achild     had renal biopsy    Irritable bowel syndrome     Kidney disease     Microscopic hematuria     Obesity     Proteinuria     Sensorineural hearing loss     Thin basement membrane disease     Bx proven       Past Surgical History:   Procedure Laterality Date    COLONOSCOPY      Dr. Kelsey Jaramillo Left 2015    CYSTOSCOPY  2018    with stent placement    KIDNEY BIOPSY  98    LAPAROSCOPY      [place] CYSTO/URETERO W/LITHOTRIPSY &INDWELL STENT INSRT Right 2018    CYSTOSCOPY URETEROSCOPY LASER, HLL performed by Stella Prescott MD at [facility] &INDWELL STENT INSRT Right 2018    CYSTOSCOPY STENT INSERTION performed by Stella Prescott MD at [address] [place] [address] URETEROSCOPY  2015    WISDOM TOOTH EXTRACTION      WRIST SURGERY      cyst removal       Social History     Tobacco Use   Smoking Status Former Smoker    Types: Cigarettes    Quit date: 3/18/2014    Years since quittin.2   Smokeless Tobacco Never Used   Tobacco Comment    several cigarettes daily        Social History     Substance and Sexual Activity   Alcohol Use No    Alcohol/week: 0.0 standard drinks       No results found for this visit on 21. HPI: Patient here today for OB visit.   Patient states her father just passed away unexpectedly so she is very stressed and frustrated today patient states they have made many arrangements and will be doing his  on Thursday. Patient states he induction early next week is still appropriate    Yes PT denies fever, chills, nausea and vomiting       Vitals:  Estimated body mass index is 42.61 kg/m² as calculated from the following:    Height as of 21: 5' 6\" (1.676 m). Weight as of this encounter: 264 lb (119.7 kg). BP: 138/88  Weight: 264 lb (119.7 kg)  Patient Position: Sitting  Albumin: Trace  Glucose: Negative  Fundal Height (cm): 39 cm  Fetal Heart Rate: 146  Movement: Present  Presentation: Vertex  Dilation (cm): 2  Effacement (%): 60  Station: -2        Results reviewed today:    No results found for this visit on 21. See prenatal vital sign section and fetal assessment section    ASSESSMENT & Plan    Diagnosis Orders   1. Prenatal care in third trimester     2. 37 weeks gestation of pregnancy               I am having Pretty Tenorio maintain her acetaminophen, Prenatal Vit-Fe Fumarate-FA (PRENATAL VITAMIN PO), omeprazole, and labetalol. Return for Keep next appt. Patient Instructions     Patient Education        Week 37 of Your Pregnancy: Care Instructions  Your Care Instructions     You are near the end of your pregnancy--and you're probably pretty uncomfortable. It may be harder to walk around. Lying down probably isn't comfortable either. You may have trouble getting to sleep or staying asleep. Most women deliver their babies between 40 and 41 weeks. This is a good time to think about packing a bag for the hospital with items you'll need. Then you'll be ready when labor starts. Follow-up care is a key part of your treatment and safety. Be sure to make and go to all appointments, and call your doctor if you are having problems. It's also a good idea to know your test results and keep a list of the medicines you take.   How can you care for yourself at home? Learn about breastfeeding  · Breastfeeding is best for your baby and good for you. · Breast milk has antibodies to help your baby fight infections. · Mothers who breastfeed often lose weight faster, because making milk burns calories. · Learning the best ways to hold your baby will make breastfeeding easier. · Let your partner bathe and diaper the baby to keep your partner from feeling left out. Snuggle together when you breastfeed. · You may want to learn how to use a breast pump and store your milk. · If you choose to bottle feed, make the feeding feel like breastfeeding so you can bond with your baby. Always hold your baby and the bottle. Do not prop bottles or let your baby fall asleep with a bottle. Learn about crying  · It is common for babies to cry for 1 to 3 hours a day. Some cry more, some cry less. · Babies don't cry to make you upset or because you are a bad parent. · Crying is how your baby communicates. Your baby may be hungry; have gas; need a diaper change; or feel cold, warm, tired, lonely, or tense. Sometimes babies cry for unknown reasons. · If you respond to your baby's needs, he or she will learn to trust you. · Try to stay calm when your baby cries. Your baby may get more upset if he or she senses that you are upset. Know how to care for your   · Your baby's umbilical cord stump will drop off on its own, usually between 1 and 2 weeks. To care for your baby's umbilical cord area:  ? Clean the area at the bottom of the cord 2 or 3 times a day. ? Pay special attention to the area where the cord attaches to the skin. ? Keep the diaper folded below the cord. ? Use a damp washcloth or cotton ball to sponge bathe your baby until the stump has come off. · Your baby's first dark stool is called meconium. After the meconium is passed, your baby will develop his or her own bowel pattern.   ? Some babies, especially  babies, have several

## 2021-07-06 ENCOUNTER — ROUTINE PRENATAL (OUTPATIENT)
Dept: OBGYN | Age: 26
End: 2021-07-06
Payer: MEDICARE

## 2021-07-06 VITALS — DIASTOLIC BLOOD PRESSURE: 80 MMHG | SYSTOLIC BLOOD PRESSURE: 134 MMHG | BODY MASS INDEX: 42.97 KG/M2 | WEIGHT: 266.2 LBS

## 2021-07-06 DIAGNOSIS — Z3A.39 39 WEEKS GESTATION OF PREGNANCY: Primary | ICD-10-CM

## 2021-07-06 DIAGNOSIS — Z34.93 PRENATAL CARE IN THIRD TRIMESTER: ICD-10-CM

## 2021-07-06 PROCEDURE — 1036F TOBACCO NON-USER: CPT | Performed by: ADVANCED PRACTICE MIDWIFE

## 2021-07-06 PROCEDURE — G8417 CALC BMI ABV UP PARAM F/U: HCPCS | Performed by: ADVANCED PRACTICE MIDWIFE

## 2021-07-06 PROCEDURE — 0502F SUBSEQUENT PRENATAL CARE: CPT | Performed by: ADVANCED PRACTICE MIDWIFE

## 2021-07-06 PROCEDURE — G8427 DOCREV CUR MEDS BY ELIG CLIN: HCPCS | Performed by: ADVANCED PRACTICE MIDWIFE

## 2021-07-06 NOTE — PROGRESS NOTES
Ant Grimaldo is here at 38w6d for:    Chief Complaint   Patient presents with    Routine Prenatal Visit       Estimated Due Date: Estimated Date of Delivery: 21    OB History    Para Term  AB Living   2 1 1     1   SAB TAB Ectopic Molar Multiple Live Births             1      # Outcome Date GA Lbr Poli/2nd Weight Sex Delivery Anes PTL Lv   2 Current            1 Term 14 40w0d  7 lb 6 oz (3.345 kg) F Vag-Spont  N NAYELI        Past Medical History:   Diagnosis Date    Deafness     Ganglion cyst of wrist     left    Hereditary nephropathy (Alport's) as achild     had renal biopsy    Irritable bowel syndrome     Kidney disease     Microscopic hematuria     Obesity     Proteinuria     Sensorineural hearing loss     Thin basement membrane disease     Bx proven       Past Surgical History:   Procedure Laterality Date    COLONOSCOPY      Dr. Ladonna Villafuerte Left 2015    CYSTOSCOPY  2018    with stent placement    KIDNEY BIOPSY  98    LAPAROSCOPY      [place] CYSTO/URETERO W/LITHOTRIPSY &INDWELL STENT INSRT Right 2018    CYSTOSCOPY URETEROSCOPY LASER, HLL performed by Gay Gaffney MD at [facility] &INDWELL STENT INSRT Right 2018    CYSTOSCOPY STENT INSERTION performed by Gay Gaffney MD at [address] [place] [address] URETEROSCOPY  2015    WISDOM TOOTH EXTRACTION      WRIST SURGERY      cyst removal       Social History     Tobacco Use   Smoking Status Former Smoker    Types: Cigarettes    Quit date: 3/18/2014    Years since quittin.3   Smokeless Tobacco Never Used   Tobacco Comment    several cigarettes daily        Social History     Substance and Sexual Activity   Alcohol Use No    Alcohol/week: 0.0 standard drinks       No results found for this visit on 21. HPI: Patient doing well today. Patient denies needs or concerns at this point in time.     Yes PT denies fever, chills, nausea

## 2021-07-07 ENCOUNTER — ANESTHESIA EVENT (OUTPATIENT)
Dept: LABOR AND DELIVERY | Age: 26
End: 2021-07-07
Payer: MEDICARE

## 2021-07-07 ENCOUNTER — HOSPITAL ENCOUNTER (INPATIENT)
Age: 26
LOS: 2 days | Discharge: HOME | End: 2021-07-09
Attending: ADVANCED PRACTICE MIDWIFE | Admitting: ADVANCED PRACTICE MIDWIFE
Payer: MEDICARE

## 2021-07-07 ENCOUNTER — ANESTHESIA (OUTPATIENT)
Dept: LABOR AND DELIVERY | Age: 26
End: 2021-07-07
Payer: MEDICARE

## 2021-07-07 ENCOUNTER — APPOINTMENT (OUTPATIENT)
Dept: LABOR AND DELIVERY | Age: 26
End: 2021-07-07
Payer: MEDICARE

## 2021-07-07 PROBLEM — Z34.90 TERM PREGNANCY: Status: ACTIVE | Noted: 2021-07-07

## 2021-07-07 LAB
ABSOLUTE EOS #: 0.09 K/UL (ref 0–0.44)
ABSOLUTE IMMATURE GRANULOCYTE: 0.03 K/UL (ref 0–0.3)
ABSOLUTE LYMPH #: 1.32 K/UL (ref 1.1–3.7)
ABSOLUTE MONO #: 0.71 K/UL (ref 0.1–1.2)
ALBUMIN SERPL-MCNC: 3.2 G/DL (ref 3.5–5.2)
ALBUMIN/GLOBULIN RATIO: 1.1 (ref 1–2.5)
ALP BLD-CCNC: 208 U/L (ref 35–104)
ALT SERPL-CCNC: 24 U/L (ref 5–33)
AMPHETAMINE SCREEN URINE: NEGATIVE
ANION GAP SERPL CALCULATED.3IONS-SCNC: 12 MMOL/L (ref 9–17)
AST SERPL-CCNC: 22 U/L
BARBITURATE SCREEN URINE: NEGATIVE
BASOPHILS # BLD: 0 % (ref 0–2)
BASOPHILS ABSOLUTE: <0.03 K/UL (ref 0–0.2)
BENZODIAZEPINE SCREEN, URINE: NEGATIVE
BILIRUB SERPL-MCNC: 0.3 MG/DL (ref 0.3–1.2)
BUN BLDV-MCNC: 8 MG/DL (ref 6–20)
BUN/CREAT BLD: 13 (ref 9–20)
BUPRENORPHINE URINE: NEGATIVE
CALCIUM SERPL-MCNC: 9.1 MG/DL (ref 8.6–10.4)
CANNABINOID SCREEN URINE: NEGATIVE
CHLORIDE BLD-SCNC: 105 MMOL/L (ref 98–107)
CO2: 19 MMOL/L (ref 20–31)
COCAINE METABOLITE, URINE: NEGATIVE
CREAT SERPL-MCNC: 0.64 MG/DL (ref 0.5–0.9)
DIFFERENTIAL TYPE: ABNORMAL
EOSINOPHILS RELATIVE PERCENT: 1 % (ref 1–4)
GFR AFRICAN AMERICAN: >60 ML/MIN
GFR NON-AFRICAN AMERICAN: >60 ML/MIN
GFR SERPL CREATININE-BSD FRML MDRD: ABNORMAL ML/MIN/{1.73_M2}
GFR SERPL CREATININE-BSD FRML MDRD: ABNORMAL ML/MIN/{1.73_M2}
GLUCOSE BLD-MCNC: 92 MG/DL (ref 70–99)
HCT VFR BLD CALC: 32.7 % (ref 36.3–47.1)
HEMOGLOBIN: 10.8 G/DL (ref 11.9–15.1)
IMMATURE GRANULOCYTES: 0 %
LYMPHOCYTES # BLD: 20 % (ref 24–43)
MCH RBC QN AUTO: 29.9 PG (ref 25.2–33.5)
MCHC RBC AUTO-ENTMCNC: 33 G/DL (ref 28.4–34.8)
MCV RBC AUTO: 90.6 FL (ref 82.6–102.9)
MDMA URINE: NORMAL
METHADONE SCREEN, URINE: NEGATIVE
METHAMPHETAMINE, URINE: NEGATIVE
MONOCYTES # BLD: 11 % (ref 3–12)
NRBC AUTOMATED: 0 PER 100 WBC
OPIATES, URINE: NEGATIVE
OXYCODONE SCREEN URINE: NEGATIVE
PDW BLD-RTO: 12.6 % (ref 11.8–14.4)
PHENCYCLIDINE, URINE: NEGATIVE
PLATELET # BLD: 261 K/UL (ref 138–453)
PLATELET ESTIMATE: ABNORMAL
PMV BLD AUTO: 10.3 FL (ref 8.1–13.5)
POTASSIUM SERPL-SCNC: 3.6 MMOL/L (ref 3.7–5.3)
PROPOXYPHENE, URINE: NEGATIVE
RBC # BLD: 3.61 M/UL (ref 3.95–5.11)
RBC # BLD: ABNORMAL 10*6/UL
SEG NEUTROPHILS: 68 % (ref 36–65)
SEGMENTED NEUTROPHILS ABSOLUTE COUNT: 4.58 K/UL (ref 1.5–8.1)
SODIUM BLD-SCNC: 136 MMOL/L (ref 135–144)
TEST INFORMATION: NORMAL
TOTAL PROTEIN: 6.2 G/DL (ref 6.4–8.3)
TRICYCLIC ANTIDEPRESSANTS, UR: NEGATIVE
WBC # BLD: 6.7 K/UL (ref 3.5–11.3)
WBC # BLD: ABNORMAL 10*3/UL

## 2021-07-07 PROCEDURE — 6360000002 HC RX W HCPCS: Performed by: NURSE ANESTHETIST, CERTIFIED REGISTERED

## 2021-07-07 PROCEDURE — 94760 N-INVAS EAR/PLS OXIMETRY 1: CPT

## 2021-07-07 PROCEDURE — 3700000025 EPIDURAL BLOCK: Performed by: NURSE ANESTHETIST, CERTIFIED REGISTERED

## 2021-07-07 PROCEDURE — 10907ZC DRAINAGE OF AMNIOTIC FLUID, THERAPEUTIC FROM PRODUCTS OF CONCEPTION, VIA NATURAL OR ARTIFICIAL OPENING: ICD-10-PCS | Performed by: ADVANCED PRACTICE MIDWIFE

## 2021-07-07 PROCEDURE — 7200000001 HC VAGINAL DELIVERY

## 2021-07-07 PROCEDURE — 6360000002 HC RX W HCPCS

## 2021-07-07 PROCEDURE — 2580000003 HC RX 258: Performed by: ADVANCED PRACTICE MIDWIFE

## 2021-07-07 PROCEDURE — 3E033VJ INTRODUCTION OF OTHER HORMONE INTO PERIPHERAL VEIN, PERCUTANEOUS APPROACH: ICD-10-PCS | Performed by: ADVANCED PRACTICE MIDWIFE

## 2021-07-07 PROCEDURE — 6370000000 HC RX 637 (ALT 250 FOR IP): Performed by: ADVANCED PRACTICE MIDWIFE

## 2021-07-07 PROCEDURE — 2500000003 HC RX 250 WO HCPCS: Performed by: NURSE ANESTHETIST, CERTIFIED REGISTERED

## 2021-07-07 PROCEDURE — 59400 OBSTETRICAL CARE: CPT | Performed by: ADVANCED PRACTICE MIDWIFE

## 2021-07-07 PROCEDURE — 0HQ9XZZ REPAIR PERINEUM SKIN, EXTERNAL APPROACH: ICD-10-PCS | Performed by: ADVANCED PRACTICE MIDWIFE

## 2021-07-07 PROCEDURE — 6360000002 HC RX W HCPCS: Performed by: ADVANCED PRACTICE MIDWIFE

## 2021-07-07 PROCEDURE — 1220000000 HC SEMI PRIVATE OB R&B

## 2021-07-07 PROCEDURE — 80306 DRUG TEST PRSMV INSTRMNT: CPT

## 2021-07-07 PROCEDURE — 0UQMXZZ REPAIR VULVA, EXTERNAL APPROACH: ICD-10-PCS | Performed by: ADVANCED PRACTICE MIDWIFE

## 2021-07-07 PROCEDURE — 80053 COMPREHEN METABOLIC PANEL: CPT

## 2021-07-07 PROCEDURE — 85025 COMPLETE CBC W/AUTO DIFF WBC: CPT

## 2021-07-07 PROCEDURE — 51702 INSERT TEMP BLADDER CATH: CPT

## 2021-07-07 PROCEDURE — 36415 COLL VENOUS BLD VENIPUNCTURE: CPT

## 2021-07-07 RX ORDER — MODIFIED LANOLIN
OINTMENT (GRAM) TOPICAL PRN
Status: DISCONTINUED | OUTPATIENT
Start: 2021-07-07 | End: 2021-07-09 | Stop reason: HOSPADM

## 2021-07-07 RX ORDER — MISOPROSTOL 100 UG/1
900 TABLET ORAL PRN
Status: DISCONTINUED | OUTPATIENT
Start: 2021-07-07 | End: 2021-07-07

## 2021-07-07 RX ORDER — NALBUPHINE HCL 10 MG/ML
10 AMPUL (ML) INJECTION
Status: DISCONTINUED | OUTPATIENT
Start: 2021-07-07 | End: 2021-07-07

## 2021-07-07 RX ORDER — ONDANSETRON 2 MG/ML
4 INJECTION INTRAMUSCULAR; INTRAVENOUS EVERY 6 HOURS PRN
Status: DISCONTINUED | OUTPATIENT
Start: 2021-07-07 | End: 2021-07-07

## 2021-07-07 RX ORDER — CARBOPROST TROMETHAMINE 250 UG/ML
250 INJECTION, SOLUTION INTRAMUSCULAR PRN
Status: DISCONTINUED | OUTPATIENT
Start: 2021-07-07 | End: 2021-07-07

## 2021-07-07 RX ORDER — METHYLERGONOVINE MALEATE 0.2 MG/ML
200 INJECTION INTRAVENOUS PRN
Status: DISCONTINUED | OUTPATIENT
Start: 2021-07-07 | End: 2021-07-07

## 2021-07-07 RX ORDER — ONDANSETRON 2 MG/ML
4 INJECTION INTRAMUSCULAR; INTRAVENOUS EVERY 6 HOURS PRN
Status: DISCONTINUED | OUTPATIENT
Start: 2021-07-07 | End: 2021-07-09 | Stop reason: HOSPADM

## 2021-07-07 RX ORDER — SODIUM CHLORIDE, SODIUM LACTATE, POTASSIUM CHLORIDE, CALCIUM CHLORIDE 600; 310; 30; 20 MG/100ML; MG/100ML; MG/100ML; MG/100ML
INJECTION, SOLUTION INTRAVENOUS CONTINUOUS
Status: DISCONTINUED | OUTPATIENT
Start: 2021-07-07 | End: 2021-07-07

## 2021-07-07 RX ORDER — SEVOFLURANE 250 ML/250ML
1 LIQUID RESPIRATORY (INHALATION) CONTINUOUS PRN
Status: DISCONTINUED | OUTPATIENT
Start: 2021-07-07 | End: 2021-07-07

## 2021-07-07 RX ORDER — DOCUSATE SODIUM 100 MG/1
100 CAPSULE, LIQUID FILLED ORAL 2 TIMES DAILY PRN
Status: DISCONTINUED | OUTPATIENT
Start: 2021-07-07 | End: 2021-07-09 | Stop reason: HOSPADM

## 2021-07-07 RX ORDER — LIDOCAINE HYDROCHLORIDE 20 MG/ML
INJECTION, SOLUTION EPIDURAL; INFILTRATION; INTRACAUDAL; PERINEURAL PRN
Status: DISCONTINUED | OUTPATIENT
Start: 2021-07-07 | End: 2021-07-07 | Stop reason: SDUPTHER

## 2021-07-07 RX ORDER — SODIUM CHLORIDE, SODIUM LACTATE, POTASSIUM CHLORIDE, CALCIUM CHLORIDE 600; 310; 30; 20 MG/100ML; MG/100ML; MG/100ML; MG/100ML
1000 INJECTION, SOLUTION INTRAVENOUS PRN
Status: DISCONTINUED | OUTPATIENT
Start: 2021-07-07 | End: 2021-07-07

## 2021-07-07 RX ORDER — SODIUM CHLORIDE 9 MG/ML
25 INJECTION, SOLUTION INTRAVENOUS PRN
Status: DISCONTINUED | OUTPATIENT
Start: 2021-07-07 | End: 2021-07-09 | Stop reason: HOSPADM

## 2021-07-07 RX ORDER — SODIUM CHLORIDE, SODIUM LACTATE, POTASSIUM CHLORIDE, CALCIUM CHLORIDE 600; 310; 30; 20 MG/100ML; MG/100ML; MG/100ML; MG/100ML
500 INJECTION, SOLUTION INTRAVENOUS PRN
Status: DISCONTINUED | OUTPATIENT
Start: 2021-07-07 | End: 2021-07-07

## 2021-07-07 RX ORDER — ONDANSETRON 2 MG/ML
INJECTION INTRAMUSCULAR; INTRAVENOUS
Status: COMPLETED
Start: 2021-07-07 | End: 2021-07-07

## 2021-07-07 RX ORDER — SODIUM CHLORIDE 0.9 % (FLUSH) 0.9 %
10 SYRINGE (ML) INJECTION EVERY 12 HOURS SCHEDULED
Status: DISCONTINUED | OUTPATIENT
Start: 2021-07-07 | End: 2021-07-09 | Stop reason: HOSPADM

## 2021-07-07 RX ORDER — LIDOCAINE HYDROCHLORIDE 10 MG/ML
30 INJECTION, SOLUTION EPIDURAL; INFILTRATION; INTRACAUDAL; PERINEURAL PRN
Status: DISCONTINUED | OUTPATIENT
Start: 2021-07-07 | End: 2021-07-07

## 2021-07-07 RX ORDER — ACETAMINOPHEN 325 MG/1
650 TABLET ORAL EVERY 4 HOURS PRN
Status: DISCONTINUED | OUTPATIENT
Start: 2021-07-07 | End: 2021-07-07

## 2021-07-07 RX ORDER — SODIUM CHLORIDE 0.9 % (FLUSH) 0.9 %
10 SYRINGE (ML) INJECTION PRN
Status: DISCONTINUED | OUTPATIENT
Start: 2021-07-07 | End: 2021-07-09 | Stop reason: HOSPADM

## 2021-07-07 RX ORDER — ACETAMINOPHEN 325 MG/1
650 TABLET ORAL EVERY 4 HOURS PRN
Status: DISCONTINUED | OUTPATIENT
Start: 2021-07-07 | End: 2021-07-09 | Stop reason: HOSPADM

## 2021-07-07 RX ORDER — SODIUM CHLORIDE 9 MG/ML
25 INJECTION, SOLUTION INTRAVENOUS PRN
Status: DISCONTINUED | OUTPATIENT
Start: 2021-07-07 | End: 2021-07-07

## 2021-07-07 RX ORDER — SODIUM CHLORIDE 0.9 % (FLUSH) 0.9 %
5-40 SYRINGE (ML) INJECTION PRN
Status: DISCONTINUED | OUTPATIENT
Start: 2021-07-07 | End: 2021-07-07

## 2021-07-07 RX ORDER — EPHEDRINE SULFATE/0.9% NACL/PF 50 MG/5 ML
5 SYRINGE (ML) INTRAVENOUS EVERY 5 MIN PRN
Status: DISCONTINUED | OUTPATIENT
Start: 2021-07-07 | End: 2021-07-07

## 2021-07-07 RX ORDER — IBUPROFEN 800 MG/1
800 TABLET ORAL EVERY 8 HOURS
Status: DISCONTINUED | OUTPATIENT
Start: 2021-07-07 | End: 2021-07-09 | Stop reason: HOSPADM

## 2021-07-07 RX ORDER — ROPIVACAINE HYDROCHLORIDE 2 MG/ML
INJECTION, SOLUTION EPIDURAL; INFILTRATION; PERINEURAL CONTINUOUS PRN
Status: DISCONTINUED | OUTPATIENT
Start: 2021-07-07 | End: 2021-07-07 | Stop reason: SDUPTHER

## 2021-07-07 RX ORDER — ROPIVACAINE HYDROCHLORIDE 2 MG/ML
12 INJECTION, SOLUTION EPIDURAL; INFILTRATION; PERINEURAL CONTINUOUS
Status: DISCONTINUED | OUTPATIENT
Start: 2021-07-07 | End: 2021-07-07

## 2021-07-07 RX ADMIN — IBUPROFEN 800 MG: 800 TABLET, FILM COATED ORAL at 22:15

## 2021-07-07 RX ADMIN — LIDOCAINE HYDROCHLORIDE 100 MG: 20 INJECTION, SOLUTION EPIDURAL; INFILTRATION; INTRACAUDAL; PERINEURAL at 17:15

## 2021-07-07 RX ADMIN — NALBUPHINE HYDROCHLORIDE 10 MG: 10 INJECTION, SOLUTION INTRAMUSCULAR; INTRAVENOUS; SUBCUTANEOUS at 16:16

## 2021-07-07 RX ADMIN — ONDANSETRON 4 MG: 2 INJECTION INTRAMUSCULAR; INTRAVENOUS at 15:32

## 2021-07-07 RX ADMIN — LIDOCAINE HYDROCHLORIDE 60 MG: 20 INJECTION, SOLUTION EPIDURAL; INFILTRATION; INTRACAUDAL; PERINEURAL at 18:19

## 2021-07-07 RX ADMIN — Medication 1 MILLI-UNITS/MIN: at 08:59

## 2021-07-07 RX ADMIN — SODIUM CHLORIDE, POTASSIUM CHLORIDE, SODIUM LACTATE AND CALCIUM CHLORIDE: 600; 310; 30; 20 INJECTION, SOLUTION INTRAVENOUS at 08:59

## 2021-07-07 RX ADMIN — METHYLERGONOVINE MALEATE 200 MCG: 0.2 INJECTION INTRAVENOUS at 20:30

## 2021-07-07 RX ADMIN — ONDANSETRON 4 MG: 2 INJECTION INTRAMUSCULAR; INTRAVENOUS at 22:50

## 2021-07-07 RX ADMIN — SODIUM CHLORIDE, POTASSIUM CHLORIDE, SODIUM LACTATE AND CALCIUM CHLORIDE 1000 ML: 600; 310; 30; 20 INJECTION, SOLUTION INTRAVENOUS at 16:47

## 2021-07-07 RX ADMIN — SODIUM CHLORIDE, PRESERVATIVE FREE 10 ML: 5 INJECTION INTRAVENOUS at 06:10

## 2021-07-07 RX ADMIN — ROPIVACAINE HYDROCHLORIDE 12 ML/HR: 2 INJECTION, SOLUTION EPIDURAL; INFILTRATION at 17:20

## 2021-07-07 RX ADMIN — SODIUM CHLORIDE, POTASSIUM CHLORIDE, SODIUM LACTATE AND CALCIUM CHLORIDE: 600; 310; 30; 20 INJECTION, SOLUTION INTRAVENOUS at 17:53

## 2021-07-07 RX ADMIN — NALBUPHINE HYDROCHLORIDE 10 MG: 10 INJECTION, SOLUTION INTRAMUSCULAR; INTRAVENOUS; SUBCUTANEOUS at 13:22

## 2021-07-07 RX ADMIN — LIDOCAINE HYDROCHLORIDE 60 MG: 20 INJECTION, SOLUTION EPIDURAL; INFILTRATION; INTRACAUDAL; PERINEURAL at 18:20

## 2021-07-07 RX ADMIN — LIDOCAINE HYDROCHLORIDE 80 MG: 20 INJECTION, SOLUTION EPIDURAL; INFILTRATION; INTRACAUDAL; PERINEURAL at 18:18

## 2021-07-07 ASSESSMENT — PAIN SCALES - GENERAL
PAINLEVEL_OUTOF10: 8
PAINLEVEL_OUTOF10: 9
PAINLEVEL_OUTOF10: 6

## 2021-07-07 ASSESSMENT — PAIN DESCRIPTION - DESCRIPTORS: DESCRIPTORS: CRAMPING;DISCOMFORT

## 2021-07-07 NOTE — PROGRESS NOTES
Pt breathing through a ctx and then states \"I think my water broke\". Perineum viewed and no fluid noted. Amniotest swab obtained. Labia spread and brownish/red mucous noted. Amniotest inserted into vagina and just covered in mucous. No color change noted. No fluid noted on perineum. SVE completed and pt turned to left side.

## 2021-07-07 NOTE — PROGRESS NOTES
Pt remains up on birthing ball but states that she would like to get back into bed. Pt positioned on right side with pillow behind her back.

## 2021-07-07 NOTE — PROGRESS NOTES
[facility] phones back. Updated that pitocin went to 21mu/min at 1430 and that patient had one dose of nubain at 1320 and SVE was completed prior to that. Also updated that in last half hour there were 15 ctx in 30 minutes. No orders at this time.

## 2021-07-07 NOTE — PROGRESS NOTES
Pt resting in bed. Pt has hospital paid  [facility] at bedside for  services. Seble will remains in continuous attendance at the bedside through labor for  service.  Stevenson Wright also at bedside. EFM adjusted. Pt states she is feeling some mild cramping but that is all. Fetal movement noted. VS taken and pt again informed we are just waiting for a room to open up to begin her induction. Call light within reach and instructed to call out for any needs.

## 2021-07-07 NOTE — PROGRESS NOTES
Pt requesting to get out of bed and sit on ball. Pt to stand at side of bed and then has urge to void. Pt to bathroom and then back to side of bed. PT sits on birthing ball and uses bed to lean on. US repositioned multiple times to get FHT to trace.

## 2021-07-07 NOTE — PROGRESS NOTES
CNM returns call. Updated that pts pain level has increased again and SVE completed prior to 2nd dose of nubain. Cervix more posterior again and possible second bag felt. Pitocin maxed at 24mu/min. States she will be over after office.

## 2021-07-07 NOTE — ANESTHESIA PRE PROCEDURE
Department of Anesthesiology  Preprocedure Note       Name:  Mendel Dean   Age:  31 y.o.  :  1995                                          MRN:  [id number]         Date:  2021      Surgeon: * No surgeons listed *    Procedure: * No procedures listed *    Medications prior to admission:   Prior to Admission medications    Medication Sig Start Date End Date Taking?  Authorizing Provider   labetalol (NORMODYNE) 100 MG tablet Take 1 tablet by mouth 2 times daily 6/15/21  Yes KEERTHI Crow CNM   omeprazole (PRILOSEC) 20 MG delayed release capsule Take 1 capsule by mouth daily 21  Yes KEERTHI Laguerre CNM   Prenatal Vit-Fe Fumarate-FA (PRENATAL VITAMIN PO) Take by mouth   Yes Historical Provider, MD   acetaminophen (TYLENOL) 500 MG tablet Take 500 mg by mouth every 6 hours as needed for Pain     Historical Provider, MD       Current medications:    Current Facility-Administered Medications   Medication Dose Route Frequency Provider Last Rate Last Admin    oxytocin (PITOCIN) 30 units in 500 mL infusion  1 kane-units/min Intravenous Continuous David Mora APRN - CNM 24 mL/hr at 21 1600 24 kane-units/min at 21 1600    lactated ringers infusion   Intravenous Continuous David Mora, APRN -  mL/hr at 21 1640 Rate Change at 21 1640    lactated ringers infusion 500 mL  500 mL Intravenous PRN David Mora, APRN - CNM        Or    lactated ringers infusion 1,000 mL  1,000 mL Intravenous PRN David Mora, APRN -  mL/hr at 21 1647 1,000 mL at 21 1647    sodium chloride flush 0.9 % injection 5-40 mL  5-40 mL Intravenous PRN David Mora, APRN - CNM   10 mL at 21 0610    0.9 % sodium chloride infusion  25 mL Intravenous PRN David Mora, APRN - CNM        lidocaine PF 1 % injection 30 mL  30 mL Other PRN David Begums, APRN - CNM        nalbuphine (NUBAIN) injection 10 mg  10 mg Intravenous Q2H PRN David Mora APRN - CNM   10 mg at 07/07/21 1616    nitrous oxide 50% inhalation 1 each  1 each Inhalation Continuous PRN Kaci Ovalle, APRN - CNM        ondansetron [facility]) injection 4 mg  4 mg Intravenous Q6H PRN Kaci Ovalle, APRN - CNM   4 mg at 07/07/21 1532    oxytocin (PITOCIN) 10 unit bolus from the bag  999 mL/hr Intravenous PRN Kaci Ovalle, APRN - CNM        And    oxytocin (PITOCIN) 30 units in 500 mL infusion  166 kane-units/min Intravenous PRN Kaci Ovalle, APRN - CNM        methylergonovine (METHERGINE) injection 200 mcg  200 mcg Intramuscular PRN Kaci Ovalle, APRN - CNM        carboprost (HEMABATE) injection 250 mcg  250 mcg Intramuscular PRN Kaci Ovalle, APRN - CNM        miSOPROStol (CYTOTEC) tablet 900 mcg  900 mcg Rectal PRN Kaci Ovalle, APRN - CNM        acetaminophen (TYLENOL) tablet 650 mg  650 mg Oral Q4H PRN Kaci Ovalle, APRN - CNM        witch hazel-glycerin (TUCKS) pad   Topical PRN Kaci Ovalle, APRN - CNM        benzocaine-menthol (DERMOPLAST) 20-0.5 % spray   Topical PRN Kaci Ovalle, APRN - CNM           Allergies:  No Known Allergies    Problem List:    Patient Active Problem List   Diagnosis Code    Thin basement membrane disease N02.9    Rh negative status during pregnancy O26.899, Z67.91    Deaf, nonspeaking H91.3    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) Q87.81    Renal calculus, right N20.0    Ureteral calculus, left N20.1    Left flank pain R10.9    Nervousness / Depression symptoms R45.0    Adenomyosis N80.0    Ureteral calculus N20.1    Viral URI with cough J06.9    Continuous RLQ abdominal pain R10.31    Kidney stone on right side N20.0    Term pregnancy Z34.90       Past Medical History:        Diagnosis Date    Deafness     Ganglion cyst of wrist 2010    left    Hereditary nephropathy (Alport's) as achild     had renal biopsy    Hypertension     Irritable bowel syndrome     Kidney disease     Microscopic hematuria     Obesity     Proteinuria     Sensorineural hearing loss     Thin basement membrane disease     Bx proven       Past Surgical History:        Procedure Laterality Date    COLONOSCOPY      Dr. Yuki Leonard Left 2015    CYSTOSCOPY  2018    with stent placement    KIDNEY BIOPSY  98    LAPAROSCOPY      [place] CYSTO/URETERO W/LITHOTRIPSY &INDWELL STENT INSRT Right 2018    CYSTOSCOPY URETEROSCOPY LASER, HLL performed by Jyoti Horton MD at [facility] &INDWELL STENT INSRT Right 2018    CYSTOSCOPY STENT INSERTION performed by Jyoti Horton MD at [address] [place] [address] URETEROSCOPY  2015    WISDOM TOOTH EXTRACTION      WRIST SURGERY  2010    cyst removal       Social History:    Social History     Tobacco Use    Smoking status: Former Smoker     Types: Cigarettes     Quit date: 3/18/2014     Years since quittin.3    Smokeless tobacco: Never Used    Tobacco comment: several cigarettes daily   Substance Use Topics    Alcohol use: No     Alcohol/week: 0.0 standard drinks                                Counseling given: Not Answered  Comment: several cigarettes daily      Vital Signs (Current):   Vitals:    21 1500 21 1527 21 1558 21 1629   BP: 125/68 137/71 123/62 (!) 121/58   Pulse: 80 88 78 76   Resp: 16  16    Temp: 36.8 °C (98.2 °F)                                                 BP Readings from Last 3 Encounters:   21 (!) 121/58   21 134/80   21 138/88       NPO Status:                                                                                 BMI:   Wt Readings from Last 3 Encounters:   21 266 lb 3.2 oz (120.7 kg)   21 264 lb (119.7 kg)   21 261 lb (118.4 kg)     There is no height or weight on file to calculate BMI.    CBC:   Lab Results   Component Value Date    WBC 6.7 2021    RBC 3.61 2021 HGB 10.8 07/07/2021    HCT 32.7 07/07/2021    MCV 90.6 07/07/2021    RDW 12.6 07/07/2021     07/07/2021       CMP:   Lab Results   Component Value Date     07/07/2021    K 3.6 07/07/2021     07/07/2021    CO2 19 07/07/2021    BUN 8 07/07/2021    CREATININE 0.64 07/07/2021    GFRAA >60 07/07/2021    LABGLOM >60 07/07/2021    GLUCOSE 92 07/07/2021    PROT 6.2 07/07/2021    CALCIUM 9.1 07/07/2021    BILITOT 0.30 07/07/2021    ALKPHOS 208 07/07/2021    AST 22 07/07/2021    ALT 24 07/07/2021       POC Tests: No results for input(s): POCGLU, POCNA, POCK, POCCL, POCBUN, POCHEMO, POCHCT in the last 72 hours. Coags: No results found for: PROTIME, INR, APTT    HCG (If Applicable):   Lab Results   Component Value Date    PREGTESTUR POSITIVE (A) 11/05/2020    HCGQUANT 14,005 (H) 01/18/2021        ABGs: No results found for: PHART, PO2ART, FPX1TBR, YUL9VYC, BEART, D0SIJWMP     Type & Screen (If Applicable):  No results found for: LABABO, LABRH    Drug/Infectious Status (If Applicable):  Lab Results   Component Value Date    HEPCAB NONREACTIVE 11/09/2020       COVID-19 Screening (If Applicable): No results found for: COVID19        Anesthesia Evaluation  Patient summary reviewed and Nursing notes reviewed no history of anesthetic complications:   Airway: Mallampati: III  TM distance: >3 FB   Neck ROM: full  Mouth opening: > = 3 FB Dental: normal exam         Pulmonary:Negative Pulmonary ROS and normal exam                               Cardiovascular:  Exercise tolerance: good (>4 METS),   (+) hypertension (PIH):,          Beta Blocker:  Dose within 24 Hrs         Neuro/Psych:   Negative Neuro/Psych ROS              GI/Hepatic/Renal:   (+) GERD: well controlled, renal disease (Labs checked and WNL.):,           Endo/Other: Negative Endo/Other ROS                    Abdominal:   (+) obese,           Vascular: negative vascular ROS.          Other Findings:           Anesthesia Plan      epidural     ASA 3 Anesthetic plan and risks discussed with patient. Plan discussed with CRNA. Preoperative note completed with  from [employer] ( [place]).     KEERTHI Rodriguez - CRNA   7/7/2021

## 2021-07-07 NOTE — ANESTHESIA PROCEDURE NOTES
Epidural Block    Patient location during procedure: OB  Start time: 7/7/2021 4:54 PM  End time: 7/7/2021 5:20 PM  Reason for block: labor epidural  Staffing  Performed: resident/CRNA   Resident/CRNA: KEERTHI Barker CRNA  Preanesthetic Checklist  Completed: patient identified, IV checked, site marked, risks and benefits discussed, monitors and equipment checked, pre-op evaluation, timeout performed, anesthesia consent given, oxygen available and patient being monitored  Epidural  Patient position: sitting  Prep: ChloraPrep and site prepped and draped  Patient monitoring: continuous pulse ox and frequent blood pressure checks  Approach: midline  Location: lumbar (1-5) (L4-L5)  Injection technique: DAMARIS saline  Guidance: anatomy guided.   Provider prep: mask and sterile gloves  Needle  Needle type: Tuohy   Needle gauge: 17 G  Needle length: 3.5 in  Needle insertion depth: 9 cm  Catheter type: side hole  Catheter size: 19 G  Catheter at skin depth: 15 cm  Test dose: negative  Kit: [employer] Perifix  Lot number: [id number]  Expiration date: 1/1/2022  Assessment  Sensory level: T4  Hemodynamics: stable  Attempts: 1

## 2021-07-07 NOTE — FLOWSHEET NOTE
Patient arrived for scheduled induction of labor. Patient accompanied by  who is to be utilized throughout patient's hospital stay. Instructed patient on obtaining clean void urine, then will place on EFM.

## 2021-07-07 NOTE — PROGRESS NOTES
Hospital paid in person  switched out. New  is [address]. Remains in continuous attendance at the bedside.

## 2021-07-07 NOTE — PLAN OF CARE
Problem: Pain:  Description: Pain management should include both nonpharmacologic and pharmacologic interventions.   Goal: Pain level will decrease  Description: Pain level will decrease  7/7/2021 0622 by Raysa Mariano RN  Outcome: Ongoing  7/7/2021 0622 by Raysa Mariano RN  Outcome: Ongoing  Goal: Control of acute pain  Description: Control of acute pain  7/7/2021 0622 by Raysa Mariano RN  Outcome: Ongoing  7/7/2021 0622 by Raysa Mariano RN  Outcome: Ongoing     Problem: Anxiety:  Goal: Level of anxiety will decrease  Description: Level of anxiety will decrease  7/7/2021 0622 by aRysa Mariano RN  Outcome: Ongoing  7/7/2021 0622 by Raysa Mariano RN  Outcome: Ongoing     Problem: Breathing Pattern - Ineffective:  Goal: Able to breathe comfortably  Description: Able to breathe comfortably  7/7/2021 0622 by Raysa Mariano RN  Outcome: Ongoing  7/7/2021 0622 by Raysa Mariano RN  Outcome: Ongoing     Problem: Fluid Volume - Imbalance:  Goal: Absence of imbalanced fluid volume signs and symptoms  Description: Absence of imbalanced fluid volume signs and symptoms  7/7/2021 0622 by Raysa Mariano RN  Outcome: Ongoing  7/7/2021 0622 by Raysa Mariano RN  Outcome: Ongoing  Goal: Absence of intrapartum hemorrhage signs and symptoms  Description: Absence of intrapartum hemorrhage signs and symptoms  7/7/2021 0622 by Raysa Mariano RN  Outcome: Ongoing  7/7/2021 0622 by Raysa Mariano RN  Outcome: Ongoing     Problem: Infection - Intrapartum Infection:  Goal: Will show no infection signs and symptoms  Description: Will show no infection signs and symptoms  7/7/2021 0622 by Raysa Mariano RN  Outcome: Ongoing  7/7/2021 0622 by Raysa Mariano RN  Outcome: Ongoing     Problem: Labor Process - Prolonged:  Goal: Labor progression, first stage, within specified pattern  Description: Labor progression, first stage, within specified pattern  7/7/2021 0622 by Reina Gottron bladder distention will decrease  7/7/2021 0622 by Raysa Mariano RN  Outcome: Ongoing  7/7/2021 0622 by Raysa Mariano RN  Outcome: Ongoing  Goal: Urinary elimination within specified parameters  Description: Urinary elimination within specified parameters  7/7/2021 0622 by Raysa Mariano RN  Outcome: Ongoing  7/7/2021 0622 by Raysa Mariano RN  Outcome: Ongoing     Problem: Pain:  Description: Pain management should include both nonpharmacologic and pharmacologic interventions.   Goal: Control of chronic pain  Description: Control of chronic pain  7/7/2021 0622 by Raysa aMriano RN  Outcome: Completed  7/7/2021 0622 by Raysa Mariano RN  Outcome: Ongoing

## 2021-07-07 NOTE — PROGRESS NOTES
Valeria CRNA called and notified pt has not had good relief from epidural that was placed and patient rating pain a 10. States she will be in.

## 2021-07-07 NOTE — PROGRESS NOTES
Department of Obstetrics and Gynecology   Progress Note      SUBJECTIVE:  Pt is beginning to get relief from the epidural and is starting to relax.      OBJECTIVE:      Vitals:    07/07/21 1727 07/07/21 1728 07/07/21 1730 07/07/21 1733   BP: (!) 92/56  (!) 111/59    Pulse: 76  85    Resp:       Temp:       SpO2:  99%  97%       Fetal heart rate:       Baseline Heart Rate:  124        Accelerations:  present       Long Term Variability:  moderate       Decelerations:  absent         Contraction frequency: 2 minutes    Membranes:  Post epidural a moderate amount of fluid was expelled    Cervix:         Dilation:  4 cm         Effacement:  80         Station:  -2         Position:  mid             ASSESSMENT & PLAN:    Continue care

## 2021-07-07 NOTE — PROGRESS NOTES
CRNA here.  Offers patient stronger medication to trial or replacement of the epidural. Pt would like to try medication dosing first.

## 2021-07-08 PROCEDURE — 6360000002 HC RX W HCPCS: Performed by: ADVANCED PRACTICE MIDWIFE

## 2021-07-08 PROCEDURE — 96372 THER/PROPH/DIAG INJ SC/IM: CPT

## 2021-07-08 PROCEDURE — 86901 BLOOD TYPING SEROLOGIC RH(D): CPT

## 2021-07-08 PROCEDURE — 6370000000 HC RX 637 (ALT 250 FOR IP): Performed by: ADVANCED PRACTICE MIDWIFE

## 2021-07-08 PROCEDURE — 86900 BLOOD TYPING SEROLOGIC ABO: CPT

## 2021-07-08 PROCEDURE — 36415 COLL VENOUS BLD VENIPUNCTURE: CPT

## 2021-07-08 PROCEDURE — 99024 POSTOP FOLLOW-UP VISIT: CPT | Performed by: ADVANCED PRACTICE MIDWIFE

## 2021-07-08 PROCEDURE — 1220000000 HC SEMI PRIVATE OB R&B

## 2021-07-08 PROCEDURE — 86850 RBC ANTIBODY SCREEN: CPT

## 2021-07-08 PROCEDURE — 85461 HEMOGLOBIN FETAL: CPT

## 2021-07-08 RX ADMIN — ACETAMINOPHEN 650 MG: 325 TABLET ORAL at 04:21

## 2021-07-08 RX ADMIN — BENZOCAINE AND LEVOMENTHOL: 200; 5 SPRAY TOPICAL at 00:52

## 2021-07-08 RX ADMIN — HUMAN RHO(D) IMMUNE GLOBULIN 300 MCG: 300 INJECTION, SOLUTION INTRAMUSCULAR at 23:05

## 2021-07-08 RX ADMIN — IBUPROFEN 800 MG: 800 TABLET, FILM COATED ORAL at 14:39

## 2021-07-08 RX ADMIN — Medication 40 EACH: at 00:52

## 2021-07-08 RX ADMIN — IBUPROFEN 800 MG: 800 TABLET, FILM COATED ORAL at 06:14

## 2021-07-08 RX ADMIN — IBUPROFEN 800 MG: 800 TABLET, FILM COATED ORAL at 22:04

## 2021-07-08 ASSESSMENT — PAIN DESCRIPTION - DESCRIPTORS
DESCRIPTORS: CRAMPING;DISCOMFORT
DESCRIPTORS: CRAMPING;DISCOMFORT

## 2021-07-08 ASSESSMENT — PAIN SCALES - GENERAL
PAINLEVEL_OUTOF10: 7
PAINLEVEL_OUTOF10: 8
PAINLEVEL_OUTOF10: 6

## 2021-07-08 ASSESSMENT — PAIN DESCRIPTION - ORIENTATION: ORIENTATION: LOWER

## 2021-07-08 ASSESSMENT — PAIN DESCRIPTION - FREQUENCY: FREQUENCY: INTERMITTENT

## 2021-07-08 ASSESSMENT — PAIN DESCRIPTION - ONSET: ONSET: ON-GOING

## 2021-07-08 ASSESSMENT — PAIN DESCRIPTION - PAIN TYPE: TYPE: ACUTE PAIN

## 2021-07-08 ASSESSMENT — PAIN DESCRIPTION - PROGRESSION: CLINICAL_PROGRESSION: GRADUALLY WORSENING

## 2021-07-08 ASSESSMENT — PAIN DESCRIPTION - LOCATION: LOCATION: ABDOMEN

## 2021-07-08 ASSESSMENT — PAIN - FUNCTIONAL ASSESSMENT: PAIN_FUNCTIONAL_ASSESSMENT: ACTIVITIES ARE NOT PREVENTED

## 2021-07-08 NOTE — PLAN OF CARE
Problem: Constipation:  Goal: Bowel elimination is within specified parameters  Description: Bowel elimination is within specified parameters  Outcome: Ongoing     Problem: Fluid Volume - Imbalance:  Goal: Absence of imbalanced fluid volume signs and symptoms  Description: Absence of imbalanced fluid volume signs and symptoms  Outcome: Ongoing  Goal: Absence of postpartum hemorrhage signs and symptoms  Description: Absence of postpartum hemorrhage signs and symptoms  Outcome: Ongoing     Problem: Infection - Risk of, Puerperal Infection:  Goal: Will show no infection signs and symptoms  Description: Will show no infection signs and symptoms  Outcome: Ongoing     Problem: Mood - Altered:  Goal: Mood stable  Description: Mood stable  Outcome: Ongoing     Problem: Pain - Acute:  Goal: Pain level will decrease  Description: Pain level will decrease  Outcome: Ongoing     Problem: Pain:  Description: Pain management should include both nonpharmacologic and pharmacologic interventions.   Goal: Pain level will decrease  Description: Pain level will decrease  Outcome: Completed  Goal: Control of acute pain  Description: Control of acute pain  Outcome: Completed     Problem: Anxiety:  Goal: Level of anxiety will decrease  Description: Level of anxiety will decrease  Outcome: Completed     Problem: Breathing Pattern - Ineffective:  Goal: Able to breathe comfortably  Description: Able to breathe comfortably  Outcome: Completed     Problem: Fluid Volume - Imbalance:  Goal: Absence of imbalanced fluid volume signs and symptoms  Description: Absence of imbalanced fluid volume signs and symptoms  Outcome: Completed  Goal: Absence of intrapartum hemorrhage signs and symptoms  Description: Absence of intrapartum hemorrhage signs and symptoms  Outcome: Completed     Problem: Infection - Intrapartum Infection:  Goal: Will show no infection signs and symptoms  Description: Will show no infection signs and symptoms  Outcome: Completed Problem: Labor Process - Prolonged:  Goal: Labor progression, first stage, within specified pattern  Description: Labor progression, first stage, within specified pattern  Outcome: Completed  Goal: Labor progession, second stage, within specified pattern  Description: Labor progession, second stage, within specified pattern  Outcome: Completed  Goal: Uterine contractions within specified parameters  Description: Uterine contractions within specified parameters  Outcome: Completed     Problem:  Screening:  Goal: Ability to make informed decisions regarding treatment has improved  Description: Ability to make informed decisions regarding treatment has improved  Outcome: Completed     Problem: Pain - Acute:  Goal: Pain level will decrease  Description: Pain level will decrease  Outcome: Completed  Goal: Able to cope with pain  Description: Able to cope with pain  Outcome: Completed     Problem: Tissue Perfusion - Uteroplacental, Altered:  Description: [TRUNCATED] For intrapartum patients with recurrent variable decelerations of the fetal heart rate, consider transcervical amnioinfusion. For patients in labor, avoid prophylactic use of continuous maternal oxygen supplementation to prevent nonreassu . ..   Goal: Absence of abnormal fetal heart rate pattern  Description: Absence of abnormal fetal heart rate pattern  Outcome: Completed     Problem: Urinary Retention:  Goal: Experiences of bladder distention will decrease  Description: Experiences of bladder distention will decrease  Outcome: Completed  Goal: Urinary elimination within specified parameters  Description: Urinary elimination within specified parameters  Outcome: Completed

## 2021-07-08 NOTE — ANESTHESIA POSTPROCEDURE EVALUATION
Department of Anesthesiology  Postprocedure Note    Patient: Fidel Mcmahon  MRN: [id number]  YOB: 1995  Date of evaluation: 7/8/2021  Time:  11:34 AM     Procedure Summary     Date: 07/07/21 Room / Location:     Anesthesia Start: 1654 Anesthesia Stop: 2020    Procedure: Labor Analgesia Diagnosis:     Scheduled Providers:  Responsible Provider: KEERTHI Alaniz CRNA    Anesthesia Type: epidural ASA Status: 3          Anesthesia Type: epidural    Azeb Phase I:      Azeb Phase II:      Last vitals: Reviewed and per EMR flowsheets. Anesthesia Post Evaluation    Patient location during evaluation: bedside  Patient participation: complete - patient participated  Level of consciousness: awake and alert  Pain score: 1  Airway patency: patent  Nausea & Vomiting: no nausea  Complications: no  Cardiovascular status: hemodynamically stable  Respiratory status: acceptable  Hydration status: euvolemic  Comments: Patient reports sensory return back to baseline. Denies any needs at this time.

## 2021-07-08 NOTE — FLOWSHEET NOTE
Writer spoke with BUZZ Dennison CNM on phone and made aware patient IV still on saline lock d/t QBL. No new orders at this time.

## 2021-07-08 NOTE — LACTATION NOTE
Lactation education resources given:     [x]  How to Breastfeed your baby - [facility] (1-) publication      [x]  Information on feeding cues     [x]  Support group handout    [x]  Breastpump cleaning guidelines - [facility]     [x]  Breastfeeding & Safe Sleep handout - [facility] (1-) publication    [x]  Calling All Dads! Handout - [facility] (1-) publication    []  Breast and Nipple Care - [facility]     []  [address]    []  [place]    []  Going Back to Work - [facility]    []  Preventing Engorgement - [facility]    Supplies given:    []  Brush, soap and basin for breastpump cleaning    []  Insurance pump provided     []  [facility] pump set up for patient to use    Explained to patient, patient verbalizes understanding.         Signed:  Raya Aguayo RN, BSN, IBCLC

## 2021-07-08 NOTE — L&D DELIVERY NOTE
Mother's Information      Labor Events     labor?: No  Rupture type: Artificial=AROM, Intact  Fluid color: Meconium  Fluid odor: None       Mother Delivery Information    Episiotomy: None  Lacerations: 1st  # of Repair Packets: 2  Surgical or Additional Est. Blood Loss (mL): 0 (View Only): Edit in Flowsheets   Combined Est. Blood Loss (mL): 0            Jona, Baby Boy Sanjay Contreras [[id number]]      Labor Events     labor?: No   steroids?: None  Cervical ripening date/time:     Antibiotics received during labor?: No  Rupture date/time: 21 12:23:00   Rupture type: Artificial=AROM, Intact  Fluid color: Meconium  Meconium consistency: Thin  Fluid odor: None  Induction: AROM, Oxytocin  Indications for induction: Hypertension  Augmentation: None  Labor complications: None              Labor Event Times      Labor onset date/time: 21     Dilation complete date/time:  21 EDT     Start pushin2021     Decision time (emergent ):            Anesthesia    Method: Epidural       Assisted Delivery Details    Forceps attempted?: No  Vacuum extractor attempted?: No       Document Additional Attempt         Document Additional Attempt                 Shoulder Dystocia    Shoulder dystocia present?: No  Add Second Maneuver  Add Third Maneuver  Add Fourth Maneuver  Add Fifth Maneuver  Add Sixth Maneuver  Add Seventh Maneuver  Add Eighth Maneuver  Add Ninth Maneuver       [place] Presentation    Presentation: Vertex  Position: Middle  _: Occiput  _: Anterior       [place] Information    Head delivery date/time: 2021 20:20:00   Changing the 's delivery date/time could affect patient care.:      Delivery date/time:  21   Delivery type: Vaginal, Spontaneous    Details:            Delivery Providers    Delivering clinician: KEERTHI Dobson CNM   Provider Role    Huyen Samuels, [address] [place] [address], RN           Cord    Vessels: 3 Vessels  Complications: Nuchal  Nuchal intervention: reduced  Nuchal cord description: loose nuchal cord  Cord around: head  Number of loops: 1  Delayed cord clamping?: Yes  Cord clamped date/time: 2021  Cord blood disposition: Lab  Cord comments: cord sample obtained       Placenta    Date/time: 2021 20:27:04  Removal: Spontaneous  Appearance: Intact  Disposition: Discarded       Delivery Resuscitation    Method: Stimulation       Apgars    Living status: Living  Apgars   1 Minute:  5 Minute:  10 Minute 15 Minute 20 Minute   Skin Color: 1  1       Heart Rate: 2  2       Reflex Irritability: 2  2       Muscle Tone: 2  2       Respiratory Effort: 2  2       Total: 9  9               Apgars Assigned By: Lida Mederos RN       Skin to Skin      Skin to skin initiation date/time: 21 20:20:00 EDT   Skin to skin with:  Mother  Skin to skin end date/time: 21 21:25:00   Breastfeeding initiated date/time: 2021 20:50:00        Measurements      Weight: 3652 g Length: 49.5 cm     Head circumference: 35.6 cm           Delivery Information    Episiotomy: None  Perineal lacerations: 1st Repaired: Yes     Labial laceration: left Repaired: Yes     Vaginal laceration: No      Cervical laceration: No      Surgical or additional est. blood loss (mL): 0 (View Only): Edit in Flowsheets   Combined est. blood loss (mL): 0       Vaginal Delivery Counts    Initial count personnel: Jens Washington RN  Initial count verified by: Marjan Mills LPN   4x4:  Needles:  Instruments:  Lap Pads:  Sponges:    Initial counts:         Final counts:         Final count personnel: Lida Mederos RN  Final count verified by: BUZZ OVIEDO CNM  Accurate final count?: Yes  Final vaginal sweep completed: vaginal sweep not performed       Other Procedures    Procedures: None       Labor Length    1st stage: 0h 38m  2nd stage: 0h 22m  3rd stage: 0h 07m              Department of Obstetrics and Gynecology  Spontaneous Vaginal Delivery Note          Pre-operative Diagnosis:  Active Problems:     (normal spontaneous vaginal delivery)    Term pregnancy    Gestational hypertension without significant proteinuria, antepartum  Resolved Problems:    * No resolved hospital problems. *      Post-operative Diagnosis:  Living  infant(s) and Male    Blood Type and Rh: A NEGATIVE      Condition:  infant stable and mother stable remain bonding in delivery room      Details of Procedure: The patient is a 31 y.o. female at 39w0d   OB History          2    Para   1    Term   1            AB        Living   1         SAB        TAB        Ectopic        Molar        Multiple        Live Births   1             who was admitted for hypertension and induction. She received the following interventions: ARBOW and IV Pitocin induction She was known to be GBS negative and did not receive antibiotic prophylaxis. The patient progressed well,did receive an epidural, became complete and started to push. After pushing for minutes  the fetal head was at the perineum,  the rest of the infant delivered atraumatically, placed on mother abdomen. 1Nucal Cord was not noted. Cord was clamped and cut per father of the baby . The delivery of the placenta was spontaneous. Placenta was inspectedintact. The perineum and vagina were explored and a First degree laceration.   Suture used for repair:  Vicryl 3.0 left labia vicryl 4-0

## 2021-07-08 NOTE — PROGRESS NOTES
Department of Obstetrics and Gynecology  Labor and Delivery       Post Partum Progress Note             SUBJECTIVE:  Pt here today states she is doing well.  We are planning on having pt remain till firday    OBJECTIVE:      Vitals:  /64   Pulse 82   Temp 97.8 °F (36.6 °C)   Resp 20   LMP 09/28/2020 (Exact Date)   SpO2 100%   Breastfeeding Unknown   Patient Vitals for the past 24 hrs:   BP Temp Temp src Pulse Resp SpO2   07/08/21 0748 106/64 97.8 °F (36.6 °C) -- 82 20 --   07/08/21 0407 125/85 98.3 °F (36.8 °C) Oral 92 18 --   07/08/21 0013 115/74 98.6 °F (37 °C) -- 97 18 --   07/07/21 2300 (!) 147/60 -- -- 78 -- --   07/07/21 2245 (!) 144/73 -- -- 102 -- --   07/07/21 2230 120/73 -- -- 90 -- --   07/07/21 2215 123/73 -- -- 95 -- --   07/07/21 2200 129/64 -- -- 90 -- --   07/07/21 2145 (!) 140/61 -- -- 100 -- --   07/07/21 2134 (!) 131/59 -- -- 90 -- --   07/07/21 2120 136/69 -- -- 85 -- --   07/07/21 2100 123/73 -- -- 99 -- --   07/07/21 2045 111/79 98.4 °F (36.9 °C) -- 103 -- --   07/07/21 2030 111/67 -- -- 120 -- --   07/07/21 2009 -- -- -- -- -- 100 %   07/07/21 2004 -- -- -- -- -- 100 %   07/07/21 2003 (!) 150/68 -- -- 96 -- --   07/07/21 1959 -- -- -- -- -- 100 %   07/07/21 1954 -- -- -- -- -- 99 %   07/07/21 1949 -- -- -- -- -- 98 %   07/07/21 1946 (!) 117/56 -- -- 53 14 --   07/07/21 1944 -- -- -- -- -- 98 %   07/07/21 1939 -- -- -- -- -- 97 %   07/07/21 1934 -- -- -- -- -- 98 %   07/07/21 1930 125/65 -- -- 55 16 98 %   07/07/21 1924 -- -- -- -- -- 100 %   07/07/21 1921 124/60 -- -- 71 -- --   07/07/21 1920 -- -- -- -- -- 99 %   07/07/21 1919 -- -- -- -- -- 99 %   07/07/21 1914 -- -- -- -- -- 100 %   07/07/21 1909 -- -- -- -- -- 100 %   07/07/21 1904 138/67 98 °F (36.7 °C) -- 75 16 100 %   07/07/21 1859 -- -- -- -- -- 99 %   07/07/21 1854 -- -- -- -- -- 99 %   07/07/21 1849 -- -- -- -- -- 99 %   07/07/21 1848 (!) 100/50 -- -- 64 -- 94 %   07/07/21 1845 (!) 100/50 -- -- -- -- 95 %   07/07/21 1844 -- -- -- -- -- 95 %   07/07/21 1840 -- -- -- -- -- 94 %   07/07/21 1839 -- -- -- -- -- 96 %   07/07/21 1835 -- -- -- -- -- 94 %   07/07/21 1834 -- -- -- -- -- 94 %   07/07/21 1831 (!) 105/52 -- -- 72 -- --   07/07/21 1829 (!) 111/52 -- -- 69 -- 96 %   07/07/21 1827 (!) 120/56 -- -- 60 -- 94 %   07/07/21 1826 (!) 109/53 -- -- 71 -- --   07/07/21 1824 -- -- -- -- -- 97 %   07/07/21 1823 (!) 109/54 -- -- 79 -- --   07/07/21 1821 120/64 -- -- 64 -- --   07/07/21 1820 (!) 117/59 -- -- 67 -- --   07/07/21 1819 -- -- -- -- -- 98 %   07/07/21 1814 -- -- -- -- -- 99 %   07/07/21 1809 -- -- -- -- -- 100 %   07/07/21 1807 -- -- -- -- -- 93 %   07/07/21 1804 -- -- -- -- -- 96 %   07/07/21 1800 -- -- -- -- -- 93 %   07/07/21 1759 -- -- -- -- -- 99 %   07/07/21 1757 117/61 -- -- 77 20 --   07/07/21 1753 -- -- -- -- -- 98 %   07/07/21 1752 129/88 -- -- 90 -- --   07/07/21 1748 -- -- -- -- -- 99 %   07/07/21 1745 110/74 -- -- 71 -- --   07/07/21 1743 -- -- -- -- -- 97 %   07/07/21 1741 (!) 114/57 -- -- 86 -- --   07/07/21 1738 -- -- -- -- -- 94 %   07/07/21 1737 (!) 111/55 -- -- 69 -- --   07/07/21 1735 -- -- -- -- -- 93 %   07/07/21 1733 -- -- -- -- -- 97 %   07/07/21 1730 (!) 111/59 -- -- 85 -- --   07/07/21 1728 -- -- -- -- -- 99 %   07/07/21 1727 (!) 92/56 -- -- 76 -- --   07/07/21 1724 115/68 -- -- 86 -- --   07/07/21 1723 -- -- -- -- -- 98 %   07/07/21 1722 (!) 111/59 -- -- 86 -- --   07/07/21 1721 (!) 109/57 -- -- 73 -- --   07/07/21 1719 (!) 123/59 -- -- 85 -- --   07/07/21 1718 -- -- -- -- -- 98 %   07/07/21 1717 130/61 -- -- 86 -- --   07/07/21 1715 127/60 -- -- 89 -- --   07/07/21 1713 -- -- -- -- -- 100 %   07/07/21 1708 -- -- -- -- -- 98 %   07/07/21 1704 -- -- -- -- -- 92 %   07/07/21 1703 -- -- -- -- -- 95 %   07/07/21 1658 (!) 150/63 98.6 °F (37 °C) -- 86 16 --   07/07/21 1629 (!) 121/58 -- -- 76 -- --   07/07/21 1558 123/62 -- -- 78 16 --   07/07/21 1527 137/71 -- -- 88 -- --   07/07/21 1500 125/68 98.2 °F (36.8 °C) -- 80 16 --   21 1459 125/68 -- -- 80 -- --   21 1427 (!) 122/58 -- -- 79 -- --   21 1356 119/70 -- -- 87 20 --   21 1326 (!) 144/93 -- -- 85 -- --   21 1258 123/62 98.1 °F (36.7 °C) -- 82 16 --   21 1227 (!) 143/71 -- -- 98 -- --   21 1223 (!) 143/71 -- -- -- -- --   21 1157 (!) 117/94 -- -- 71 16 --   21 1127 127/66 -- -- 78 -- --   21 1103 (!) 96/52 97.7 °F (36.5 °C) -- 71 16 --   21 1034 (!) 95/51 -- -- 63 -- --   21 1028 (!) 169/78 -- -- 78 -- --   21 1006 121/70 -- -- 75 16 --   21 0906 (!) 113/57 -- -- 82 16 --         ABDOMEN: normal shape, position and consistency  GENITAL/URINARY:  External Genitalia:  General appearance; normal, Hair distribution; normal, Lesions absent  Uterus:  Size normal, Position normal  Breast:normal appearance, no masses or tenderness  Cor: RRR no Murmurs  Pulmonary: clear to auscultation anterior and posterior  Extremities: no Clubbing cyanosis or ecchymosis    DATA:          ASSESSMENT :      Active Problems:     (normal spontaneous vaginal delivery)      Term pregnancy      Gestational hypertension without significant proteinuria, antepartum          Plan:  Continue care

## 2021-07-09 VITALS
RESPIRATION RATE: 16 BRPM | SYSTOLIC BLOOD PRESSURE: 121 MMHG | HEART RATE: 92 BPM | DIASTOLIC BLOOD PRESSURE: 67 MMHG | OXYGEN SATURATION: 100 % | TEMPERATURE: 98.1 F

## 2021-07-09 LAB
ABO/RH: NORMAL
ANTIBODY SCREEN: NEGATIVE
BLD PROD TYP BPU: NORMAL
BLD PROD TYP BPU: NORMAL
DISPENSE STATUS BLOOD BANK: NORMAL
DU ANTIGEN: NORMAL
FETAL ROSETTE: NEGATIVE
HISTORY CHECK: NORMAL
Lab: 1
RHIG ELIGIBILITY: NORMAL
TRANSFUSION STATUS: NORMAL
UNIT DIVISION: 0
UNIT NUMBER: NORMAL

## 2021-07-09 PROCEDURE — 90707 MMR VACCINE SC: CPT | Performed by: ADVANCED PRACTICE MIDWIFE

## 2021-07-09 PROCEDURE — 6360000002 HC RX W HCPCS: Performed by: ADVANCED PRACTICE MIDWIFE

## 2021-07-09 PROCEDURE — 90471 IMMUNIZATION ADMIN: CPT | Performed by: ADVANCED PRACTICE MIDWIFE

## 2021-07-09 PROCEDURE — 6370000000 HC RX 637 (ALT 250 FOR IP): Performed by: ADVANCED PRACTICE MIDWIFE

## 2021-07-09 RX ORDER — IBUPROFEN 800 MG/1
800 TABLET ORAL EVERY 8 HOURS
Qty: 60 TABLET | Refills: 0 | Status: SHIPPED | OUTPATIENT
Start: 2021-07-09 | End: 2022-03-29

## 2021-07-09 RX ORDER — PSEUDOEPHEDRINE HCL 30 MG
100 TABLET ORAL 2 TIMES DAILY PRN
Qty: 60 CAPSULE | Refills: 0 | Status: SHIPPED | OUTPATIENT
Start: 2021-07-09 | End: 2022-03-29

## 2021-07-09 RX ADMIN — MEASLES, MUMPS, AND RUBELLA VIRUS VACCINE LIVE 0.5 ML: 1000; 12500; 1000 INJECTION, POWDER, LYOPHILIZED, FOR SUSPENSION SUBCUTANEOUS at 11:23

## 2021-07-09 RX ADMIN — IBUPROFEN 800 MG: 800 TABLET, FILM COATED ORAL at 07:35

## 2021-07-09 ASSESSMENT — PAIN SCALES - GENERAL: PAINLEVEL_OUTOF10: 6

## 2021-07-09 NOTE — LACTATION NOTE
Lactation note:    [] Feeding observed, see lactation flowsheet. [x] Patient states breastfeeding is going well:  no complaints. Denies questions or concerns. Infant has been doing well since yesterday. [x] Patient has questions/concerns. Reviewed pump set up and usage with pt. [x] Verbalizes understanding, advised to follow up with lactation consultant if necessary.

## 2021-07-09 NOTE — PLAN OF CARE
Problem: Constipation:  Goal: Bowel elimination is within specified parameters  Description: Bowel elimination is within specified parameters  Outcome: Ongoing     Problem: Fluid Volume - Imbalance:  Goal: Absence of imbalanced fluid volume signs and symptoms  Description: Absence of imbalanced fluid volume signs and symptoms  Outcome: Ongoing  Goal: Absence of postpartum hemorrhage signs and symptoms  Description: Absence of postpartum hemorrhage signs and symptoms  Outcome: Ongoing     Problem: Infection - Risk of, Puerperal Infection:  Goal: Will show no infection signs and symptoms  Description: Will show no infection signs and symptoms  Outcome: Ongoing     Problem: Mood - Altered:  Goal: Mood stable  Description: Mood stable  Outcome: Ongoing     Problem: Pain - Acute:  Goal: Pain level will decrease  Description: Pain level will decrease  Outcome: Ongoing

## 2021-07-09 NOTE — DISCHARGE SUMMARY
Obstetrical Discharge Form        Gestational Age:39w0d    Antepartum complications: pregnancy induced hypertension    Date of Delivery: 2021    Type of Delivery:        Delivered By:  Filiberto PENDLETON CNLUIS    Assisted By:N/A      Baby: male    Anesthesia:  epidural      Intrapartum complications: None    Feeding method: breast    Blood type: A NEGATIVE      Rubella:    Rubella Antibody, IGG   Date Value Ref Range Status   2020 2.1 IU/mL Final     Comment:                 REFERENCE RANGE:  <5.0       NON-REACTIVE (non-immune)  5.0 TO 9.9 EQUIVOCAL  >=10.0     REACTIVE     (immune)             T. Pallidium, IGG:    T. pallidum, IgG   Date Value Ref Range Status   2020 NONREACTIVE NONREACTIVE Final     Comment:           T. pallidum antibodies are not detected. There is no serological evidence of infection with T. pallidum (early primary syphilis   cannot be excluded). Retest in 2-4 weeks if syphilis is clinically suspect.              Hepatitis B Surface Antigen:   Hepatitis B Surface Ag   Date Value Ref Range Status   2020 NONREACTIVE NONREACTIVE Final     HIV:  No results found for: [id number]    Results for orders placed or performed during the hospital encounter of 21   DRUG SCREEN MULTI URINE   Result Value Ref Range    Amphetamine Screen, Ur NEGATIVE NEGATIVE    Barbiturate Screen, Ur NEGATIVE NEGATIVE    Benzodiazepine Screen, Urine NEGATIVE NEGATIVE    Cocaine Metabolite, Urine NEGATIVE NEGATIVE    Methadone Screen, Urine NEGATIVE NEGATIVE    Opiates, Urine NEGATIVE NEGATIVE    Phencyclidine, Urine NEGATIVE NEGATIVE    Propoxyphene, Urine NEGATIVE NEGATIVE    Cannabinoid Scrn, Ur NEGATIVE NEGATIVE    Oxycodone Screen, Ur NEGATIVE NEGATIVE    Methamphetamine, Urine NEGATIVE NEGATIVE    Tricyclic Antidepressants, Urine NEGATIVE NEGATIVE    MDMA, Urine NOT REPORTED NEGATIVE    Buprenorphine Urine NEGATIVE NEGATIVE    Test Information NOT REPORTED    CBC auto differential   Result Value Ref Range    WBC 6.7 3.5 - 11.3 k/uL    RBC 3.61 (L) 3.95 - 5.11 m/uL    Hemoglobin 10.8 (L) 11.9 - 15.1 g/dL    Hematocrit 32.7 (L) 36.3 - 47.1 %    MCV 90.6 82.6 - 102.9 fL    MCH 29.9 25.2 - 33.5 pg    MCHC 33.0 28.4 - 34.8 g/dL    RDW 12.6 11.8 - 14.4 %    Platelets 599 463 - 190 k/uL    MPV 10.3 8.1 - 13.5 fL    NRBC Automated 0.0 0.0 per 100 WBC    Differential Type NOT REPORTED     Seg Neutrophils 68 (H) 36 - 65 %    Lymphocytes 20 (L) 24 - 43 %    Monocytes 11 3 - 12 %    Eosinophils % 1 1 - 4 %    Basophils 0 0 - 2 %    Immature Granulocytes 0 0 %    Segs Absolute 4.58 1.50 - 8.10 k/uL    Absolute Lymph # 1.32 1.10 - 3.70 k/uL    Absolute Mono # 0.71 0.10 - 1.20 k/uL    Absolute Eos # 0.09 0.00 - 0.44 k/uL    Basophils Absolute <0.03 0.00 - 0.20 k/uL    Absolute Immature Granulocyte 0.03 0.00 - 0.30 k/uL    WBC Morphology NOT REPORTED     RBC Morphology NOT REPORTED     Platelet Estimate NOT REPORTED    Comprehensive metabolic panel   Result Value Ref Range    Glucose 92 70 - 99 mg/dL    BUN 8 6 - 20 mg/dL    CREATININE 0.64 0.50 - 0.90 mg/dL    Bun/Cre Ratio 13 9 - 20    Calcium 9.1 8.6 - 10.4 mg/dL    Sodium 136 135 - 144 mmol/L    Potassium 3.6 (L) 3.7 - 5.3 mmol/L    Chloride 105 98 - 107 mmol/L    CO2 19 (L) 20 - 31 mmol/L    Anion Gap 12 9 - 17 mmol/L    Alkaline Phosphatase 208 (H) 35 - 104 U/L    ALT 24 5 - 33 U/L    AST 22 <32 U/L    Total Bilirubin 0.30 0.3 - 1.2 mg/dL    Total Protein 6.2 (L) 6.4 - 8.3 g/dL    Albumin 3.2 (L) 3.5 - 5.2 g/dL    Albumin/Globulin Ratio 1.1 1.0 - 2.5    GFR Non-African American >60 >60 mL/min    GFR African American >60 >60 mL/min    GFR Comment          GFR Staging         RHOGAM POSTPARTUM   Result Value Ref Range    Product Code MANUFACTURED PRODUCT     Number of Units 1     ABO/Rh A NEGATIVE     Antibody Screen NEGATIVE     History Check A  NEGATIVE       Rhig Eligibility Patient Is A Candidate For Injection     Fetal Nayeli NEGATIVE     Du Antigen NOT TESTED Unit Number [id number]     Product Code RHIG     Unit Divison 00     Dispense Status TRANSFUSED     Transfusion Status OK TO TRANSFUSE          Postpartum complications: gestational hypertension    Discharge Medication:    Landon Valentin   Home Medication Instructions AOP:[id number]    Printed on:07/09/21 1021   Medication Information                      docusate sodium (COLACE, DULCOLAX) 100 MG CAPS  Take 100 mg by mouth 2 times daily as needed for Constipation             ibuprofen (ADVIL;MOTRIN) 800 MG tablet  Take 1 tablet by mouth every 8 hours             labetalol (NORMODYNE) 100 MG tablet  Take 1 tablet by mouth 2 times daily             omeprazole (PRILOSEC) 20 MG delayed release capsule  Take 1 capsule by mouth daily             Prenatal Vit-Fe Fumarate-FA (PRENATAL VITAMIN PO)  Take by mouth                    Admit date: 7/7/2021  5:33 AM    Discharge Date: 7/9/2021    Discharged to: Home Iin stable condition        Plan:   Follow up in 2 week(s) for post partum visit, blood pressure check, breast feeding check and post partum depression check

## 2021-07-09 NOTE — PROGRESS NOTES
Department of Obstetrics and Gynecology  Labor and Delivery       Post Partum Progress Note             SUBJECTIVE: Use of  to assist in sign language. Pt reports she is feeling well, desires discharge to home today. Breastfeeding and states it is going well. Denies concerns. OBJECTIVE:      Vitals:  /67   Pulse 92   Temp 98.1 °F (36.7 °C)   Resp 16   LMP 2020 (Exact Date)   SpO2 100%   Breastfeeding Unknown   Patient Vitals for the past 24 hrs:   BP Temp Temp src Pulse Resp   21 0843 121/67 98.1 °F (36.7 °C) -- 92 16   21 2309 (!) 119/56 98.3 °F (36.8 °C) -- 78 14   21 2054 132/83 97.7 °F (36.5 °C) Oral 78 16   21 1521 134/71 98.2 °F (36.8 °C) -- 102 16   21 1206 122/64 98 °F (36.7 °C) Oral 99 16         ABDOMEN: FFM U/1  GENITAL/URINARY:  External Genitalia:  General appearance; normal, Hair distribution; normal  Cor: RRR no Murmurs  Pulmonary: clear to auscultation anterior and posterior  Extremities: no Clubbing cyanosis or ecchymosis    DATA:    CBC with Differential:    Lab Results   Component Value Date    WBC 6.7 2021    RBC 3.61 2021    HGB 10.8 2021    HCT 32.7 2021     2021    MCV 90.6 2021    MCH 29.9 2021    MCHC 33.0 2021    RDW 12.6 2021    LYMPHOPCT 20 2021    MONOPCT 11 2021    BASOPCT 0 2021    MONOSABS 0.71 2021    LYMPHSABS 1.32 2021    EOSABS 0.09 2021    BASOSABS <0.03 2021    DIFFTYPE NOT REPORTED 2021     ASSESSMENT :      Active Problems:     (normal spontaneous vaginal delivery)    Term pregnancy    Gestational hypertension without significant proteinuria, antepartum    Plan:  Reviewed discharge instructions, warning signs. Rx to pharmacy. Follow up in 2 weeks in office.

## 2021-07-12 NOTE — H&P
SURGERY  2010    cyst removal     Allergies:  Patient has no known allergies. Social History:    Social History     Socioeconomic History    Marital status: Single     Spouse name: Not on file    Number of children: Not on file    Years of education: Not on file    Highest education level: Not on file   Occupational History    Not on file   Tobacco Use    Smoking status: Former Smoker     Types: Cigarettes     Quit date: 3/18/2014     Years since quittin.3    Smokeless tobacco: Never Used    Tobacco comment: several cigarettes daily   Vaping Use    Vaping Use: Never used   Substance and Sexual Activity    Alcohol use: No     Alcohol/week: 0.0 standard drinks    Drug use: No    Sexual activity: Yes     Partners: Male   Other Topics Concern    Not on file   Social History Narrative    Not on file     Social Determinants of Health     Financial Resource Strain:     Difficulty of Paying Living Expenses:    Food Insecurity:     Worried About Running Out of Food in the Last Year:     Ran Out of Food in the Last Year:    Transportation Needs:     Lack of Transportation (Medical):      Lack of Transportation (Non-Medical):    Physical Activity:     Days of Exercise per Week:     Minutes of Exercise per Session:    Stress:     Feeling of Stress :    Social Connections:     Frequency of Communication with Friends and Family:     Frequency of Social Gatherings with Friends and Family:     Attends Anabaptist Services:     Active Member of Clubs or Organizations:     Attends Club or Organization Meetings:     Marital Status:    Intimate Partner Violence:     Fear of Current or Ex-Partner:     Emotionally Abused:     Physically Abused:     Sexually Abused:      Family History:       Problem Relation Age of Onset    Cancer Paternal Grandmother         bone    High Cholesterol Mother     Cancer Mother         breast    High Blood Pressure Mother     Migraines Mother     Alcohol Abuse Father     Anxiety Disorder Father     Depression Father     Hearing Loss Brother     Hearing Loss Brother      Medications Prior to Admission:  No medications prior to admission. REVIEW OF SYSTEMS:    CONSTITUTIONAL:  negative  RESPIRATORY:  negative  CARDIOVASCULAR:  negative  GASTROINTESTINAL:  negative  ALLERGIC/IMMUNOLOGIC:  negative  NEUROLOGICAL:  negative  BEHAVIOR/PSYCH:  negative    PHYSICAL EXAM:  Vitals:    07/08/21 1521 07/08/21 2054 07/08/21 2309 07/09/21 0843   BP: 134/71 132/83 (!) 119/56 121/67   Pulse: 102 78 78 92   Resp: 16 16 14 16   Temp: 98.2 °F (36.8 °C) 97.7 °F (36.5 °C) 98.3 °F (36.8 °C) 98.1 °F (36.7 °C)   TempSrc:  Oral     SpO2:         General appearance:  awake, alert, cooperative, no apparent distress, and appears stated age  Neurologic:  Awake, alert, oriented to name, place and time. Lungs:  No increased work of breathing, good air exchange  Abdomen:  Soft, non tender, gravid, consistent with her gestational age, EFW by Leopald's manouever was 7-14   Fetal heart rate:  Reassuring. Pelvis:  Adequate pelvis  Cervix: 2 cm    Membranes:  Ruptured     Labs:  Blood Type: A NEGATIVE    Rubella:    Rubella Antibody, IGG   Date Value Ref Range Status   11/09/2020 2.1 IU/mL Final     Comment:                 REFERENCE RANGE:  <5.0       NON-REACTIVE (non-immune)  5.0 TO 9.9 EQUIVOCAL  >=10.0     REACTIVE     (immune)             T. Pallidium, IGG:    T. pallidum, IgG   Date Value Ref Range Status   11/09/2020 NONREACTIVE NONREACTIVE Final     Comment:           T. pallidum antibodies are not detected. There is no serological evidence of infection with T. pallidum (early primary syphilis   cannot be excluded). Retest in 2-4 weeks if syphilis is clinically suspect.              Sickle Cell Screen: No results found for: SICKLE  Hepatitis B Surface Antigen:   Hepatitis B Surface Ag   Date Value Ref Range Status   11/09/2020 NONREACTIVE NONREACTIVE Final     HIV:    Lab Results Component Value Date    [id number] NONREACTIVE 04/24/2014              Results for orders placed or performed during the hospital encounter of 07/07/21   DRUG SCREEN MULTI URINE   Result Value Ref Range    Amphetamine Screen, Ur NEGATIVE NEGATIVE    Barbiturate Screen, Ur NEGATIVE NEGATIVE    Benzodiazepine Screen, Urine NEGATIVE NEGATIVE    Cocaine Metabolite, Urine NEGATIVE NEGATIVE    Methadone Screen, Urine NEGATIVE NEGATIVE    Opiates, Urine NEGATIVE NEGATIVE    Phencyclidine, Urine NEGATIVE NEGATIVE    Propoxyphene, Urine NEGATIVE NEGATIVE    Cannabinoid Scrn, Ur NEGATIVE NEGATIVE    Oxycodone Screen, Ur NEGATIVE NEGATIVE    Methamphetamine, Urine NEGATIVE NEGATIVE    Tricyclic Antidepressants, Urine NEGATIVE NEGATIVE    MDMA, Urine NOT REPORTED NEGATIVE    Buprenorphine Urine NEGATIVE NEGATIVE    Test Information NOT REPORTED    CBC auto differential   Result Value Ref Range    WBC 6.7 3.5 - 11.3 k/uL    RBC 3.61 (L) 3.95 - 5.11 m/uL    Hemoglobin 10.8 (L) 11.9 - 15.1 g/dL    Hematocrit 32.7 (L) 36.3 - 47.1 %    MCV 90.6 82.6 - 102.9 fL    MCH 29.9 25.2 - 33.5 pg    MCHC 33.0 28.4 - 34.8 g/dL    RDW 12.6 11.8 - 14.4 %    Platelets 628 031 - 551 k/uL    MPV 10.3 8.1 - 13.5 fL    NRBC Automated 0.0 0.0 per 100 WBC    Differential Type NOT REPORTED     Seg Neutrophils 68 (H) 36 - 65 %    Lymphocytes 20 (L) 24 - 43 %    Monocytes 11 3 - 12 %    Eosinophils % 1 1 - 4 %    Basophils 0 0 - 2 %    Immature Granulocytes 0 0 %    Segs Absolute 4.58 1.50 - 8.10 k/uL    Absolute Lymph # 1.32 1.10 - 3.70 k/uL    Absolute Mono # 0.71 0.10 - 1.20 k/uL    Absolute Eos # 0.09 0.00 - 0.44 k/uL    Basophils Absolute <0.03 0.00 - 0.20 k/uL    Absolute Immature Granulocyte 0.03 0.00 - 0.30 k/uL    WBC Morphology NOT REPORTED     RBC Morphology NOT REPORTED     Platelet Estimate NOT REPORTED    Comprehensive metabolic panel   Result Value Ref Range    Glucose 92 70 - 99 mg/dL    BUN 8 6 - 20 mg/dL    CREATININE 0.64 0.50 - 0.90 mg/dL    Bun/Cre Ratio 13 9 - 20    Calcium 9.1 8.6 - 10.4 mg/dL    Sodium 136 135 - 144 mmol/L    Potassium 3.6 (L) 3.7 - 5.3 mmol/L    Chloride 105 98 - 107 mmol/L    CO2 19 (L) 20 - 31 mmol/L    Anion Gap 12 9 - 17 mmol/L    Alkaline Phosphatase 208 (H) 35 - 104 U/L    ALT 24 5 - 33 U/L    AST 22 <32 U/L    Total Bilirubin 0.30 0.3 - 1.2 mg/dL    Total Protein 6.2 (L) 6.4 - 8.3 g/dL    Albumin 3.2 (L) 3.5 - 5.2 g/dL    Albumin/Globulin Ratio 1.1 1.0 - 2.5    GFR Non-African American >60 >60 mL/min    GFR African American >60 >60 mL/min    GFR Comment          GFR Staging         RHOGAM POSTPARTUM   Result Value Ref Range    Product Code MANUFACTURED PRODUCT     Number of Units 1     ABO/Rh A NEGATIVE     Antibody Screen NEGATIVE     History Check A  NEGATIVE       Rhig Eligibility Patient Is A Candidate For Injection     Fetal Nayeli NEGATIVE     Du Antigen NOT TESTED     Unit Number [id number]     Product Code RHIG     Unit Divison 00     Dispense Status TRANSFUSED     Transfusion Status OK TO TRANSFUSE        ASSESSMENT AND PLAN:    Estimated length of stay: 2 midnights post vaginal delivery    Principal Problem:     Active Problems:    Term pregnancy      Gestational hypertension without significant proteinuria, antepartum        Labor: Admit, anticipate normal delivery, routine labor orders  Fetus: Reassuring, Cat 1  GBS: No  Other: pitocin    Reviewed plan with Dr. Delmy Stacy, APRN - CNM,CNM,7/12/2021 5:20 PM

## 2021-07-20 ENCOUNTER — POSTPARTUM VISIT (OUTPATIENT)
Dept: OBGYN | Age: 26
End: 2021-07-20
Payer: MEDICARE

## 2021-07-20 VITALS
BODY MASS INDEX: 39.21 KG/M2 | DIASTOLIC BLOOD PRESSURE: 84 MMHG | SYSTOLIC BLOOD PRESSURE: 122 MMHG | WEIGHT: 244 LBS | HEIGHT: 66 IN

## 2021-07-20 PROCEDURE — 1036F TOBACCO NON-USER: CPT | Performed by: ADVANCED PRACTICE MIDWIFE

## 2021-07-20 PROCEDURE — G8427 DOCREV CUR MEDS BY ELIG CLIN: HCPCS | Performed by: ADVANCED PRACTICE MIDWIFE

## 2021-07-20 PROCEDURE — 1111F DSCHRG MED/CURRENT MED MERGE: CPT | Performed by: ADVANCED PRACTICE MIDWIFE

## 2021-07-20 PROCEDURE — G8417 CALC BMI ABV UP PARAM F/U: HCPCS | Performed by: ADVANCED PRACTICE MIDWIFE

## 2021-07-20 PROCEDURE — 99024 POSTOP FOLLOW-UP VISIT: CPT | Performed by: ADVANCED PRACTICE MIDWIFE

## 2021-07-20 PROCEDURE — 99211 OFF/OP EST MAY X REQ PHY/QHP: CPT | Performed by: ADVANCED PRACTICE MIDWIFE

## 2021-07-20 NOTE — PROGRESS NOTES
POSTPARTUM EXAM    Date of service: 2021    Santino Dan  Is a 31 y.o. single female    : 1995     OB History    Para Term  AB Living   2 2 2     2   SAB TAB Ectopic Molar Multiple Live Births           0 2      # Outcome Date GA Lbr Poli/2nd Weight Sex Delivery Anes PTL Lv   2 Term 21 39w0d 00:38 / 00:22 8 lb 0.8 oz (3.652 kg) M Vag-Spont EPI N NAYELI   1 Term 14 40w0d  7 lb 6 oz (3.345 kg) F Vag-Spont  N NAYELI        Social History     Tobacco Use   Smoking Status Former Smoker    Types: Cigarettes    Quit date: 3/18/2014    Years since quittin.3   Smokeless Tobacco Never Used   Tobacco Comment    several cigarettes daily         Social History     Substance and Sexual Activity   Alcohol Use No    Alcohol/week: 0.0 standard drinks         Delivery date 21    Type of delivery:  Spontaneous vaginal delivery    Laceration:Yes, First degree laceration. Suture used for repair:  Vicryl 4.0    Infant gender: male    Infant name: Mary Moran    Are you breast or bottle feeding? Breast    Have you been sexually active since delivery? No    History of thyroid issues? No  If yes order TSH with reflux    History of gestational diabetes? No  If yes order 2 hr GT    Vital Signs: Blood pressure 122/84, height 5' 6\" (1.676 m), weight 244 lb (110.7 kg), last menstrual period 2020, currently breastfeeding. Allergies: Patient has no known allergies.       Current Outpatient Medications:     ibuprofen (ADVIL;MOTRIN) 800 MG tablet, Take 1 tablet by mouth every 8 hours, Disp: 60 tablet, Rfl: 0    docusate sodium (COLACE, DULCOLAX) 100 MG CAPS, Take 100 mg by mouth 2 times daily as needed for Constipation, Disp: 60 capsule, Rfl: 0    Prenatal Vit-Fe Fumarate-FA (PRENATAL VITAMIN PO), Take by mouth, Disp: , Rfl:     labetalol (NORMODYNE) 100 MG tablet, Take 1 tablet by mouth 2 times daily (Patient not taking: Reported on 2021), Disp: 60 tablet, Rfl: 1    omeprazole (PRILOSEC) 20 MG delayed release capsule, Take 1 capsule by mouth daily (Patient not taking: Reported on 2021), Disp: 30 capsule, Rfl: 3        Chief Complaint   Patient presents with    Postpartum Care     pt presents for 2 week ppv - she is still sore and is having some cramping        How many Hours of sleep do you get a night: 5-6    Do you have a normal appetite: yes    Any problems or pain: sore    Do you feel like you coping well: yes    Is baby sleeping:good    How is baby eatin-14 times a day    How did first pediatrician visit go: good      EPPDS:   Postpartum Depression Scale 2021   I have been able to laugh and see the funny side of things As much as I always could   I have looked forward with enjoyment to things As much as I ever did   I have blamed myself unnecessarily when things went wrong No, never   I have been anxious or worried for no good reason No, not at all   I have felt scared or panicky for no good reason No, not at all   I haven't been able to cope lately No, I have been coping as well as ever   I have been so unhappy that I have had difficulty sleeping Not at all   I have felt sad or miserable Not very often   I have been so unhappy that I have been crying Only occasionally   The thought of harming myself has occurred to me Never   Total 2           No results found for this visit on 21. Nurse: evan    HPI:  PT presents for Post partum exam Follow up in 2 week(s) after delivery. Patient states she is breast-feeding 13-14 times a day patient states her bleeding stopped 2 days ago patient states she did have 1 day of some pelvic pain and is wondering if that is related to the pervious pain she is going to have a surgery for. I did review with patient that it takes 6 weeks for her uterus to return to normal and with the breast-feeding she may not have periods.  Patient will wait and see how the uterus and pain progress    Objective  Lymphatic:   no lymphadenopathy  Heent:   negative   Cor: regular rate and rhythm, no murmurs              Pul:clear to auscultation bilaterally- no wheezes, rales or rhonchi, normal air movement, no respiratory distress      GI: Abdomen soft, non-tender. BS normal. No masses,  No organomegaly           Extremities: normal strength, tone, and muscle mass                                 Assessment and Plan          Diagnosis Orders   1. 2 weeks postpartum follow-up     2.  (spontaneous vaginal delivery)               I am having Pretty Tenorio maintain her Prenatal Vit-Fe Fumarate-FA (PRENATAL VITAMIN PO), omeprazole, labetalol, ibuprofen, and docusate. Return for Keep next appt. She was also counseled on her preventative health maintenance recommendations and follow-up. There are no Patient Instructions on file for this visit.     KEERTHI Ravi CNM,2021 4:36 PM

## 2021-08-17 ENCOUNTER — POSTPARTUM VISIT (OUTPATIENT)
Dept: OBGYN | Age: 26
End: 2021-08-17
Payer: MEDICARE

## 2021-08-17 VITALS
SYSTOLIC BLOOD PRESSURE: 128 MMHG | DIASTOLIC BLOOD PRESSURE: 80 MMHG | BODY MASS INDEX: 39.86 KG/M2 | WEIGHT: 248 LBS | HEIGHT: 66 IN

## 2021-08-17 LAB — HGB, POC: 13

## 2021-08-17 PROCEDURE — 85018 HEMOGLOBIN: CPT | Performed by: ADVANCED PRACTICE MIDWIFE

## 2021-08-17 PROCEDURE — 1036F TOBACCO NON-USER: CPT | Performed by: ADVANCED PRACTICE MIDWIFE

## 2021-08-17 PROCEDURE — G8427 DOCREV CUR MEDS BY ELIG CLIN: HCPCS | Performed by: ADVANCED PRACTICE MIDWIFE

## 2021-08-17 PROCEDURE — 0503F POSTPARTUM CARE VISIT: CPT | Performed by: ADVANCED PRACTICE MIDWIFE

## 2021-08-17 PROCEDURE — G8417 CALC BMI ABV UP PARAM F/U: HCPCS | Performed by: ADVANCED PRACTICE MIDWIFE

## 2021-08-17 RX ORDER — ACETAMINOPHEN AND CODEINE PHOSPHATE 120; 12 MG/5ML; MG/5ML
1 SOLUTION ORAL DAILY
Qty: 84 TABLET | Refills: 3 | Status: SHIPPED | OUTPATIENT
Start: 2021-08-17 | End: 2022-03-29

## 2021-08-17 NOTE — PATIENT INSTRUCTIONS
Patient Education        After Pregnancy: Exercises  Introduction  Here are some examples of exercises that can help after pregnancy. Start each exercise slowly. Ease off the exercise if you start to have pain. Your doctor or physical therapist will tell you when you can start these exercises and which ones will work best for you. How to do the exercises  Tummy tuck   1. Lie on your back, and place two fingers just inside your hip bones so you can feel your lower belly muscles. 2. Take a deep breath in.  3. As you breathe out, pull your belly button in toward your spine, as if you are trying to zip up a tight pair of jeans. You should feel your lower belly muscles pull slightly away from your fingers as the muscles tighten. 4. Hold for about 6 seconds, but do not hold your breath. 5. Repeat 8 to 12 times. 6. Repeat several times a day, and try to hold your lower belly muscles in for longer as you get stronger. 7. Practice doing this exercise while you are standing, such as when you are standing in line, or sitting. Heel slides   1. Lie on the floor with your knees bent, and place two fingers just inside your hip bones so you can feel your lower belly muscles. Your feet should be flat on the floor. 2. Pull your belly button in toward your spine. You should feel your lower belly muscles pull slightly away from your fingers as the muscles tighten. 3. Keep holding your belly button in as you slowly slide one foot along the floor until your leg is out straight. 4. Slowly slide the leg back to your starting position while making sure that you keep holding your belly button in.  5. Do not arch or move your back as you are doing this. Do not hold your breath. 6. Relax and repeat with your other leg. 7. Repeat 8 to 12 times. Knee-to-chest stretch   1. Lie on your back with one knee bent and the other leg straight.   2. Clasp your hands together under your bent knee and bring the knee to your chest. Keep your lower back pressed to the floor. 3. If it hurts your back to keep your opposite leg straight as you stretch, bend that knee too, and keep that foot flat on the floor. 4. Hold for at least 15 to 30 seconds. 5. Relax and lower the knee to the starting position. 6. Repeat with the other leg. 7. Repeat 2 to 4 times with each leg. Neck rotation   1. Sit in a firm chair, or stand up straight. 2. Keeping your chin level, turn your head to the right, and hold for 15 to 30 seconds. 3. Turn your head to the left and hold for 15 to 30 seconds. 4. Repeat 2 to 4 times to each side. Shoulder rolls   1. Sit comfortably with your feet shoulder-width apart. You can also do this exercise while standing. 2. Roll your shoulders up, then back, and then down in a smooth, circular motion. 3. Repeat 2 to 4 times. Midback stretch   If you have knee pain, do not do this exercise. 1. Kneel on the floor, and sit back on your ankles. 2. Lean forward, place your hands on the floor, and stretch your arms out in front of you. Rest your head between your arms. 3. Gently push your chest toward the floor, reaching as far in front of you as possible. 4. Hold for 15 to 30 seconds. 5. Repeat 2 to 4 times. Back stretches   1. Get down on your hands and knees on the floor. 2. Relax your head and allow it to droop. Round your back up toward the ceiling until you feel a nice stretch in your upper, middle, and lower back. Hold this stretch for as long as it feels comfortable, or about 15 to 30 seconds. 3. Return to the starting position with a flat back while you are on your hands and knees. 4. Let your back sway by pressing your stomach toward the floor. Lift your buttocks toward the ceiling. 5. Hold this position for 15 to 30 seconds. 6. Repeat 2 to 4 times. Hamstring stretch (lying down)   1. Lie flat on your back with your legs straight.  If you feel discomfort in your back, place a small towel roll under your lower back.  2. Holding the back of your leg, lift your leg straight up and toward your body until you feel a stretch at the back of your thigh. 3. Hold the stretch for at least 30 seconds. 4. Repeat 2 to 4 times. 5. Switch legs and repeat steps 1 through 4. Calf stretch   1. Stand facing a wall with your hands on the wall at about eye level. Put your leg about a step behind your other leg. 2. Keeping your back leg straight and your back heel on the floor, bend your front knee and gently bring your hip and chest toward the wall until you feel a stretch in the calf of your back leg. 3. Hold the stretch for 15 to 30 seconds. 4. Repeat 2 to 4 times. 5. Repeat steps 1 through 4, but this time keep your back knee bent. 6. Switch legs and repeat steps 1 through 5. Follow-up care is a key part of your treatment and safety. Be sure to make and go to all appointments, and call your doctor if you are having problems. It's also a good idea to know your test results and keep a list of the medicines you take. Where can you learn more? Go to https://netTALKpepicBacktrace I/Oeb.Zuu Onlnine. org and sign in to your [employer] account. Enter X386 in the [facility] box to learn more about \"After Pregnancy: Exercises. \"     If you do not have an account, please click on the \"Sign Up Now\" link. Current as of: October 8, 2020               Content Version: 12.9  © 3034-2463 [employer]. Care instructions adapted under license by [facility] ([facility]). If you have questions about a medical condition or this instruction, always ask your healthcare professional. [place] [address] any warranty or liability for your use of this information.

## 2021-08-17 NOTE — PROGRESS NOTES
POSTPARTUM EXAM    Date of service: 2021    Saul Lane  Is a 31 y.o. single female    PT's PCP is: Lissa Anderson MD     : 1995     OB History    Para Term  AB Living   2 2 2     2   SAB TAB Ectopic Molar Multiple Live Births           0 2      # Outcome Date GA Lbr Poli/2nd Weight Sex Delivery Anes PTL Lv   2 Term 21 39w0d 00:38 / 00:22 8 lb 0.8 oz (3.652 kg) M Vag-Spont EPI N NAYELI   1 Term 14 40w0d  7 lb 6 oz (3.345 kg) F Vag-Spont  N NAYELI        Social History     Tobacco Use   Smoking Status Former Smoker    Types: Cigarettes    Quit date: 3/18/2014    Years since quittin.4   Smokeless Tobacco Never Used   Tobacco Comment    several cigarettes daily         Social History     Substance and Sexual Activity   Alcohol Use No    Alcohol/week: 0.0 standard drinks         Delivery date 21    Type of delivery:  Spontaneous vaginal delivery    Laceration:Yes, DANIEL 3.0 Left Labia Daniel 4-0    Infant gender: male    Infant name: Yogi Daly     Are you breast or bottle feeding? Breast    Have you been sexually active since delivery? No    Vital Signs: Blood pressure 128/80, height 5' 6\" (1.676 m), weight 248 lb (112.5 kg), last menstrual period 2020, currently breastfeeding. Labs:    Blood Type and Rh: A NEGATIVE          Allergies: Patient has no known allergies.       Current Outpatient Medications:     ibuprofen (ADVIL;MOTRIN) 800 MG tablet, Take 1 tablet by mouth every 8 hours, Disp: 60 tablet, Rfl: 0    docusate sodium (COLACE, DULCOLAX) 100 MG CAPS, Take 100 mg by mouth 2 times daily as needed for Constipation, Disp: 60 capsule, Rfl: 0    Prenatal Vit-Fe Fumarate-FA (PRENATAL VITAMIN PO), Take by mouth, Disp: , Rfl:     labetalol (NORMODYNE) 100 MG tablet, Take 1 tablet by mouth 2 times daily (Patient not taking: Reported on 2021), Disp: 60 tablet, Rfl: 1    omeprazole (PRILOSEC) 20 MG delayed release capsule, Take 1 capsule by mouth daily (Patient not taking: Reported on 7/20/2021), Disp: 30 capsule, Rfl: 3    Last Yearly:  2020    Last pap: 01/05/21    Last HPV: never     Chief Complaint   Patient presents with    Postpartum Care     6 week pp care following vaginal delivery on 07/07/21. last pap 01/05/21. pt states no issues or concerns. pt is currently breastfeeding, pt has not been sexually active since delivery, pt would like to discuss cycle control options. How many Hours of sleep do you get a night: about 6 to 7 hours     Do you have a normal appetite: yes     Any problems or pain: no    Do you feel like you coping well: yes     Is baby sleeping: yes     How is baby eating:  Good     How did first pediatrician visit go: pretty good     EPPDS: 2  Postpartum Depression Scale 8/17/2021   I have been able to laugh and see the funny side of things As much as I always could   I have looked forward with enjoyment to things As much as I ever did   I have blamed myself unnecessarily when things went wrong No, never   I have been anxious or worried for no good reason Hardly ever   I have felt scared or panicky for no good reason No, not at all   I haven't been able to cope lately No, I have been coping as well as ever   I have been so unhappy that I have had difficulty sleeping Not at all   I have felt sad or miserable Not very often   I have been so unhappy that I have been crying No, never   The thought of harming myself has occurred to me Never   Total 2         No results found for this visit on 08/17/21. Nurse: NAZIA    HPI:  PT presents for Post partum exam Follow up in 6 week(s) after delivery. Patient states she is doing well. Patient denies any needs or concerns and states that that she has not had any uterus pain at this point in time. Patient states she would like to hold off on the hysterectomy that she was previously going to get.       Objective  Lymphatic:   no lymphadenopathy  Heent:   negative      GI: Abdomen soft, non-tender. BS normal. No masses,  No organomegaly           Extremities: normal strength, tone, and muscle mass                               Assessment and Plan          Diagnosis Orders   1. Postpartum care following vaginal delivery  POCT hemoglobin    norethindrone (ORTHO MICRONOR) 0.35 MG tablet             I am having Pretty Tenorio start on norethindrone. I am also having her maintain her Prenatal Vit-Fe Fumarate-FA (PRENATAL VITAMIN PO), omeprazole, labetalol, ibuprofen, and docusate. Return in about 5 months (around 1/10/2022) for yearly. She was also counseled on her preventative health maintenance recommendations and follow-up. Patient Instructions       Patient Education        After Pregnancy: Exercises  Introduction  Here are some examples of exercises that can help after pregnancy. Start each exercise slowly. Ease off the exercise if you start to have pain. Your doctor or physical therapist will tell you when you can start these exercises and which ones will work best for you. How to do the exercises  [place] johnstonck   1. Lie on your back, and place two fingers just inside your hip bones so you can feel your lower belly muscles. 2. Take a deep breath in.  3. As you breathe out, pull your belly button in toward your spine, as if you are trying to zip up a tight pair of jeans. You should feel your lower belly muscles pull slightly away from your fingers as the muscles tighten. 4. Hold for about 6 seconds, but do not hold your breath. 5. Repeat 8 to 12 times. 6. Repeat several times a day, and try to hold your lower belly muscles in for longer as you get stronger. 7. Practice doing this exercise while you are standing, such as when you are standing in line, or sitting. Heel slides   1. Lie on the floor with your knees bent, and place two fingers just inside your hip bones so you can feel your lower belly muscles. Your feet should be flat on the floor.   2. Pull your belly button in toward your spine. You should feel your lower belly muscles pull slightly away from your fingers as the muscles tighten. 3. Keep holding your belly button in as you slowly slide one foot along the floor until your leg is out straight. 4. Slowly slide the leg back to your starting position while making sure that you keep holding your belly button in.  5. Do not arch or move your back as you are doing this. Do not hold your breath. 6. Relax and repeat with your other leg. 7. Repeat 8 to 12 times. Knee-to-chest stretch   1. Lie on your back with one knee bent and the other leg straight. 2. Clasp your hands together under your bent knee and bring the knee to your chest. Keep your lower back pressed to the floor. 3. If it hurts your back to keep your opposite leg straight as you stretch, bend that knee too, and keep that foot flat on the floor. 4. Hold for at least 15 to 30 seconds. 5. Relax and lower the knee to the starting position. 6. Repeat with the other leg. 7. Repeat 2 to 4 times with each leg. Neck rotation   1. Sit in a firm chair, or stand up straight. 2. Keeping your chin level, turn your head to the right, and hold for 15 to 30 seconds. 3. Turn your head to the left and hold for 15 to 30 seconds. 4. Repeat 2 to 4 times to each side. Shoulder rolls   1. Sit comfortably with your feet shoulder-width apart. You can also do this exercise while standing. 2. Roll your shoulders up, then back, and then down in a smooth, circular motion. 3. Repeat 2 to 4 times. Midback stretch   If you have knee pain, do not do this exercise. 1. Kneel on the floor, and sit back on your ankles. 2. Lean forward, place your hands on the floor, and stretch your arms out in front of you. Rest your head between your arms. 3. Gently push your chest toward the floor, reaching as far in front of you as possible. 4. Hold for 15 to 30 seconds. 5. Repeat 2 to 4 times. Back stretches   1.  Get down on your hands and knees on the floor. 2. Relax your head and allow it to droop. Round your back up toward the ceiling until you feel a nice stretch in your upper, middle, and lower back. Hold this stretch for as long as it feels comfortable, or about 15 to 30 seconds. 3. Return to the starting position with a flat back while you are on your hands and knees. 4. Let your back sway by pressing your stomach toward the floor. Lift your buttocks toward the ceiling. 5. Hold this position for 15 to 30 seconds. 6. Repeat 2 to 4 times. Hamstring stretch (lying down)   1. Lie flat on your back with your legs straight. If you feel discomfort in your back, place a small towel roll under your lower back. 2. Holding the back of your leg, lift your leg straight up and toward your body until you feel a stretch at the back of your thigh. 3. Hold the stretch for at least 30 seconds. 4. Repeat 2 to 4 times. 5. Switch legs and repeat steps 1 through 4. Calf stretch   1. Stand facing a wall with your hands on the wall at about eye level. Put your leg about a step behind your other leg. 2. Keeping your back leg straight and your back heel on the floor, bend your front knee and gently bring your hip and chest toward the wall until you feel a stretch in the calf of your back leg. 3. Hold the stretch for 15 to 30 seconds. 4. Repeat 2 to 4 times. 5. Repeat steps 1 through 4, but this time keep your back knee bent. 6. Switch legs and repeat steps 1 through 5. Follow-up care is a key part of your treatment and safety. Be sure to make and go to all appointments, and call your doctor if you are having problems. It's also a good idea to know your test results and keep a list of the medicines you take. Where can you learn more? Go to https://[email or web address].health[facility] [email or web address] org and sign in to your [employer] account. Enter J035 in the [facility] box to learn more about \"After Pregnancy: Exercises. \"     If you do not have an account, please click on the \"Sign Up Now\" link. Current as of: October 8, 2020               Content Version: 12.9  © 2006-2021 Healthwise, Incorporated. Care instructions adapted under license by [facility] ([facility]). If you have questions about a medical condition or this instruction, always ask your healthcare professional. [place] [address] any warranty or liability for your use of this information.              KEERTHI Barbour CNM,8/17/2021 8:57 AM

## 2022-01-04 ENCOUNTER — OFFICE VISIT (OUTPATIENT)
Dept: OBGYN | Age: 27
End: 2022-01-04
Payer: MEDICARE

## 2022-01-04 VITALS
WEIGHT: 255 LBS | HEIGHT: 66 IN | DIASTOLIC BLOOD PRESSURE: 84 MMHG | BODY MASS INDEX: 40.98 KG/M2 | SYSTOLIC BLOOD PRESSURE: 128 MMHG

## 2022-01-04 DIAGNOSIS — R10.2 PELVIC PAIN: ICD-10-CM

## 2022-01-04 DIAGNOSIS — N94.6 DYSMENORRHEA: Primary | ICD-10-CM

## 2022-01-04 PROCEDURE — G8484 FLU IMMUNIZE NO ADMIN: HCPCS | Performed by: OBSTETRICS & GYNECOLOGY

## 2022-01-04 PROCEDURE — G8427 DOCREV CUR MEDS BY ELIG CLIN: HCPCS | Performed by: OBSTETRICS & GYNECOLOGY

## 2022-01-04 PROCEDURE — 99213 OFFICE O/P EST LOW 20 MIN: CPT | Performed by: OBSTETRICS & GYNECOLOGY

## 2022-01-04 PROCEDURE — G8417 CALC BMI ABV UP PARAM F/U: HCPCS | Performed by: OBSTETRICS & GYNECOLOGY

## 2022-01-04 PROCEDURE — 1036F TOBACCO NON-USER: CPT | Performed by: OBSTETRICS & GYNECOLOGY

## 2022-01-04 NOTE — PROGRESS NOTES
DATE OF VISIT:  1/4/22    PATIENT NAME:  Matt Mcnally     YOB: 1995    REASON FOR VISIT:    Chief Complaint   Patient presents with    Pelvic Pain     Patient notes that she has had bad pelvic pain since the birth of her son. She states that she had the pain prior to pregnancy. She notes that she has to sit alot due to the discomfort. She would like to discuss surgical options, she is done having children. HISTORY OF PRESENT ILLNESS:  Pt with long history of heavy menstrual cycles and pelvic pain; was planning to have hysterectomy last year but surprisingly became pregnant and had to cancel surgery; little boy is doing great - she is breast feeding; the pain is back since the birth of her son just as it was before; pain almost daily causes her to sit down due to intensity; hx painful intercourse as well; interested in going forward with hyst again      No LMP recorded. Vitals:    01/04/22 1050   BP: 128/84   Weight: 255 lb (115.7 kg)   Height: 5' 6\" (1.676 m)     Body mass index is 41.16 kg/m². No Known Allergies  Current Outpatient Medications   Medication Sig Dispense Refill    ibuprofen (ADVIL;MOTRIN) 800 MG tablet Take 1 tablet by mouth every 8 hours 60 tablet 0    Prenatal Vit-Fe Fumarate-FA (PRENATAL VITAMIN PO) Take by mouth      norethindrone (ORTHO MICRONOR) 0.35 MG tablet Take 1 tablet by mouth daily (Patient not taking: Reported on 1/4/2022) 84 tablet 3    docusate sodium (COLACE, DULCOLAX) 100 MG CAPS Take 100 mg by mouth 2 times daily as needed for Constipation (Patient not taking: Reported on 1/4/2022) 60 capsule 0    labetalol (NORMODYNE) 100 MG tablet Take 1 tablet by mouth 2 times daily (Patient not taking: Reported on 7/20/2021) 60 tablet 1    omeprazole (PRILOSEC) 20 MG delayed release capsule Take 1 capsule by mouth daily (Patient not taking: Reported on 7/20/2021) 30 capsule 3     No current facility-administered medications for this visit.      Social History     Socioeconomic History    Marital status: Single     Spouse name: None    Number of children: None    Years of education: None    Highest education level: None   Occupational History    None   Tobacco Use    Smoking status: Former Smoker     Types: Cigarettes     Quit date: 3/18/2014     Years since quittin.8    Smokeless tobacco: Never Used    Tobacco comment: several cigarettes daily   Vaping Use    Vaping Use: Never used   Substance and Sexual Activity    Alcohol use: No     Alcohol/week: 0.0 standard drinks    Drug use: No    Sexual activity: Not Currently     Partners: Male   Other Topics Concern    None   Social History Narrative    None     Social Determinants of Health     Financial Resource Strain:     Difficulty of Paying Living Expenses: Not on file   Food Insecurity:     Worried About Running Out of Food in the Last Year: Not on file    Stoney of Food in the Last Year: Not on file   Transportation Needs:     Lack of Transportation (Medical): Not on file    Lack of Transportation (Non-Medical):  Not on file   Physical Activity:     Days of Exercise per Week: Not on file    Minutes of Exercise per Session: Not on file   Stress:     Feeling of Stress : Not on file   Social Connections:     Frequency of Communication with Friends and Family: Not on file    Frequency of Social Gatherings with Friends and Family: Not on file    Attends Pentecostal Services: Not on file    Active Member of [address] [place] [address] or Organizations: Not on file    Attends Club or Organization Meetings: Not on file    Marital Status: Not on file   Intimate Partner Violence:     Fear of Current or Ex-Partner: Not on file    Emotionally Abused: Not on file    Physically Abused: Not on file    Sexually Abused: Not on file   Housing Stability:     Unable to Pay for Housing in the Last Year: Not on file    Number of [place] in the Last Year: Not on file    Unstable Housing in the Last Year: Not on file       REVIEW OF SYSTEMS:  Review of Systems   Constitutional: Negative for chills and fever. Genitourinary: Positive for dyspareunia, menstrual problem and pelvic pain. Negative for vaginal discharge. PHYSICAL EXAM:  /84   Ht 5' 6\" (1.676 m)   Wt 255 lb (115.7 kg)   Breastfeeding Yes   BMI 41.16 kg/m²   Physical Exam  Constitutional:       Appearance: Normal appearance. HENT:      Head: Normocephalic and atraumatic. Eyes:      Extraocular Movements: Extraocular movements intact. Pupils: Pupils are equal, round, and reactive to light. Cardiovascular:      Rate and Rhythm: Normal rate. Pulmonary:      Effort: Pulmonary effort is normal.   Musculoskeletal:         General: Normal range of motion. Neurological:      Mental Status: She is alert and oriented to person, place, and time. Skin:     General: Skin is warm and dry. Psychiatric:         Mood and Affect: Mood normal.         Thought Content: Thought content normal.         Judgment: Judgment normal.       The patient, Kisha Maldonado is a 31 y.o. female, was seen with a total time spent of 20 minutes for the visit on this date of service by the E/M provider. The time component had both face to face and non face to face time spent in determining the total time component. Counseling and education regarding her diagnosis listed below and her options regarding those diagnoses were also included in determining her time component. Diagnosis Orders   1. Dysmenorrhea     2. Pelvic pain          The patient had her preventative health maintenance recommendations and follow-up reviewed with her at the completion of her visit. ASSESSMENT:      1. Dysmenorrhea    2. Pelvic pain        PLAN:  No orders of the defined types were placed in this encounter. Return for  to coordinate.        Electronically signed by Sweta Zamudio DO on 01/04/22

## 2022-01-24 NOTE — PROGRESS NOTES
Patient called and wanted to go ahead with [facility]. She is scheduled for a RA SUNNY, tanya BSO, poss Lazaro Colpo at [facility] on 4/11/22. She is aware that a nurse from [facility] will call her to complete a phone interview. Patient is aware that under current guidelines, she will not need COVID testing as she is vaccinated. She denies needing a note for work. Patient will come in to see Dr. Bryan Peraza prior to surgery and will get labs on admission. We will also follow up 2 and 6 weeks after surgery. Appointments scheduled. Patient verbalized understanding, no further questions/concerns voiced.

## 2022-03-29 ENCOUNTER — OFFICE VISIT (OUTPATIENT)
Dept: OBGYN | Age: 27
End: 2022-03-29
Payer: MEDICARE

## 2022-03-29 VITALS
BODY MASS INDEX: 40.66 KG/M2 | SYSTOLIC BLOOD PRESSURE: 124 MMHG | DIASTOLIC BLOOD PRESSURE: 80 MMHG | WEIGHT: 253 LBS | HEIGHT: 66 IN

## 2022-03-29 DIAGNOSIS — N94.6 DYSMENORRHEA: Primary | ICD-10-CM

## 2022-03-29 DIAGNOSIS — R10.2 PELVIC PAIN: ICD-10-CM

## 2022-03-29 PROCEDURE — 99211 OFF/OP EST MAY X REQ PHY/QHP: CPT

## 2022-03-29 PROCEDURE — G8484 FLU IMMUNIZE NO ADMIN: HCPCS | Performed by: OBSTETRICS & GYNECOLOGY

## 2022-03-29 PROCEDURE — 99213 OFFICE O/P EST LOW 20 MIN: CPT | Performed by: OBSTETRICS & GYNECOLOGY

## 2022-03-29 PROCEDURE — 1036F TOBACCO NON-USER: CPT | Performed by: OBSTETRICS & GYNECOLOGY

## 2022-03-29 PROCEDURE — G8427 DOCREV CUR MEDS BY ELIG CLIN: HCPCS | Performed by: OBSTETRICS & GYNECOLOGY

## 2022-03-29 PROCEDURE — G8417 CALC BMI ABV UP PARAM F/U: HCPCS | Performed by: OBSTETRICS & GYNECOLOGY

## 2022-03-29 NOTE — PROGRESS NOTES
DATE OF VISIT:  3/29/22    PATIENT NAME:  Rolo Good     YOB: 1995    REASON FOR VISIT:    Chief Complaint   Patient presents with    Follow-up     Patient is scheduled for TLH/possible BSO 22. Patient notes that she continues to have a lot of pelvic pain. HISTORY OF PRESENT ILLNESS:  Pt with long history of heavy menstrual cycles and pelvic pain; was planning to have hysterectomy last year but surprisingly became pregnant and had to cancel surgery; little boy is doing great - she is breast feeding; the pain is back since the birth of her son just as it was before; pain almost daily causes her to sit down due to intensity; hx painful intercourse as well; disc'd tlh/bl salpingectomy with ovarian conservation if possible; r/b/a reviewed; recovery and restrictions disc'd      No LMP recorded. Vitals:    22 1518   BP: 124/80   Weight: 253 lb (114.8 kg)   Height: 5' 6\" (1.676 m)     Body mass index is 40.84 kg/m². No Known Allergies  Current Outpatient Medications   Medication Sig Dispense Refill    Prenatal Vit-Fe Fumarate-FA (PRENATAL VITAMIN PO) Take by mouth       No current facility-administered medications for this visit.      Social History     Socioeconomic History    Marital status: Single     Spouse name: None    Number of children: None    Years of education: None    Highest education level: None   Occupational History    None   Tobacco Use    Smoking status: Former Smoker     Types: Cigarettes     Quit date: 3/18/2014     Years since quittin.0    Smokeless tobacco: Never Used    Tobacco comment: several cigarettes daily   Vaping Use    Vaping Use: Never used   Substance and Sexual Activity    Alcohol use: No     Alcohol/week: 0.0 standard drinks    Drug use: No    Sexual activity: Not Currently     Partners: Male   Other Topics Concern    None   Social History Narrative    None     Social Determinants of Health     Financial Resource Strain:     Difficulty of Paying Living Expenses: Not on file   Food Insecurity:     Worried About Running Out of Food in the Last Year: Not on file    Ran Out of Food in the Last Year: Not on file   Transportation Needs:     Lack of Transportation (Medical): Not on file    Lack of Transportation (Non-Medical): Not on file   Physical Activity:     Days of Exercise per Week: Not on file    Minutes of Exercise per Session: Not on file   Stress:     Feeling of Stress : Not on file   Social Connections:     Frequency of Communication with Friends and Family: Not on file    Frequency of Social Gatherings with Friends and Family: Not on file    Attends Mormon Services: Not on file    Active Member of [address] [place] [address] [employer] or Organizations: Not on file    Attends Club or Organization Meetings: Not on file    Marital Status: Not on file   Intimate Partner Violence:     Fear of Current or Ex-Partner: Not on file    Emotionally Abused: Not on file    Physically Abused: Not on file    Sexually Abused: Not on file   Housing Stability:     Unable to Pay for Housing in the Last Year: Not on file    Number of [place] in the Last Year: Not on file    Unstable Housing in the Last Year: Not on file       REVIEW OF SYSTEMS:  Review of Systems   Constitutional: Negative for chills and fever. Genitourinary: Positive for dyspareunia, menstrual problem and pelvic pain. PHYSICAL EXAM:  /80   Ht 5' 6\" (1.676 m)   Wt 253 lb (114.8 kg)   BMI 40.84 kg/m²   Physical Exam  Constitutional:       Appearance: Normal appearance. HENT:      Head: Normocephalic and atraumatic. Eyes:      Extraocular Movements: Extraocular movements intact. Pupils: Pupils are equal, round, and reactive to light. Cardiovascular:      Rate and Rhythm: Normal rate. Pulmonary:      Effort: Pulmonary effort is normal.   Musculoskeletal:         General: Normal range of motion.    Neurological:      Mental Status: She is alert and oriented to person, place, and time. Skin:     General: Skin is warm and dry. Psychiatric:         Mood and Affect: Mood normal.         Behavior: Behavior normal.         Thought Content: Thought content normal.         Judgment: Judgment normal.       The patient, Princess Webster is a 28 y.o. female, was seen with a total time spent of 20 minutes for the visit on this date of service by the E/M provider. The time component had both face to face and non face to face time spent in determining the total time component. Counseling and education regarding her diagnosis listed below and her options regarding those diagnoses were also included in determining her time component. Diagnosis Orders   1. Dysmenorrhea     2. Pelvic pain          The patient had her preventative health maintenance recommendations and follow-up reviewed with her at the completion of her visit. ASSESSMENT:      1. Dysmenorrhea    2. Pelvic pain        PLAN:  No orders of the defined types were placed in this encounter. Return in about 1 week (around 4/5/2022) for RA TLH/bl salpingectomy.        Electronically signed by Sarah Anaya DO on 03/29/22

## 2022-03-30 NOTE — PROGRESS NOTES
used to instruct patient on the pre-operative, intra-operative, and post-operative process. Patient instructed on NPO status. Medication instructions and pre operative instruction sheet reviewed with the patient via . CHG skin prep instructions reviewed. Patient is currently breastfeeding and is noted in [employer]. Surgery  and secretaries notified that patient will require  the day of surgery.

## 2022-04-08 ENCOUNTER — ANESTHESIA EVENT (OUTPATIENT)
Dept: OPERATING ROOM | Age: 27
End: 2022-04-08
Payer: MEDICARE

## 2022-04-11 ENCOUNTER — ANESTHESIA (OUTPATIENT)
Dept: OPERATING ROOM | Age: 27
End: 2022-04-11
Payer: MEDICARE

## 2022-04-11 ENCOUNTER — HOSPITAL ENCOUNTER (OUTPATIENT)
Age: 27
Setting detail: OUTPATIENT SURGERY
Discharge: HOME | End: 2022-04-11
Attending: OBSTETRICS & GYNECOLOGY | Admitting: OBSTETRICS & GYNECOLOGY
Payer: MEDICARE

## 2022-04-11 VITALS — DIASTOLIC BLOOD PRESSURE: 108 MMHG | OXYGEN SATURATION: 100 % | SYSTOLIC BLOOD PRESSURE: 130 MMHG

## 2022-04-11 DIAGNOSIS — G89.18 POST-OP PAIN: Primary | ICD-10-CM

## 2022-04-11 LAB — HCG(URINE) PREGNANCY TEST: NEGATIVE

## 2022-04-11 PROCEDURE — 7100000000 HC PACU RECOVERY - FIRST 15 MIN: Performed by: OBSTETRICS & GYNECOLOGY

## 2022-04-11 PROCEDURE — 6370000000 HC RX 637 (ALT 250 FOR IP): Performed by: NURSE ANESTHETIST, CERTIFIED REGISTERED

## 2022-04-11 PROCEDURE — 6360000002 HC RX W HCPCS

## 2022-04-11 PROCEDURE — 2580000003 HC RX 258: Performed by: NURSE ANESTHETIST, CERTIFIED REGISTERED

## 2022-04-11 PROCEDURE — 2500000003 HC RX 250 WO HCPCS: Performed by: NURSE ANESTHETIST, CERTIFIED REGISTERED

## 2022-04-11 PROCEDURE — 88307 TISSUE EXAM BY PATHOLOGIST: CPT

## 2022-04-11 PROCEDURE — 58571 TLH W/T/O 250 G OR LESS: CPT | Performed by: OBSTETRICS & GYNECOLOGY

## 2022-04-11 PROCEDURE — 58571 TLH W/T/O 250 G OR LESS: CPT

## 2022-04-11 PROCEDURE — 2709999900 HC NON-CHARGEABLE SUPPLY: Performed by: OBSTETRICS & GYNECOLOGY

## 2022-04-11 PROCEDURE — 3600000019 HC SURGERY ROBOT ADDTL 15MIN: Performed by: OBSTETRICS & GYNECOLOGY

## 2022-04-11 PROCEDURE — A4216 STERILE WATER/SALINE, 10 ML: HCPCS | Performed by: NURSE ANESTHETIST, CERTIFIED REGISTERED

## 2022-04-11 PROCEDURE — 6360000002 HC RX W HCPCS: Performed by: NURSE ANESTHETIST, CERTIFIED REGISTERED

## 2022-04-11 PROCEDURE — 7100000001 HC PACU RECOVERY - ADDTL 15 MIN: Performed by: OBSTETRICS & GYNECOLOGY

## 2022-04-11 PROCEDURE — 3700000001 HC ADD 15 MINUTES (ANESTHESIA): Performed by: OBSTETRICS & GYNECOLOGY

## 2022-04-11 PROCEDURE — 64488 TAP BLOCK BI INJECTION: CPT | Performed by: NURSE ANESTHETIST, CERTIFIED REGISTERED

## 2022-04-11 PROCEDURE — 3600000009 HC SURGERY ROBOT BASE: Performed by: OBSTETRICS & GYNECOLOGY

## 2022-04-11 PROCEDURE — 3700000000 HC ANESTHESIA ATTENDED CARE: Performed by: OBSTETRICS & GYNECOLOGY

## 2022-04-11 PROCEDURE — 81025 URINE PREGNANCY TEST: CPT

## 2022-04-11 PROCEDURE — S2900 ROBOTIC SURGICAL SYSTEM: HCPCS | Performed by: OBSTETRICS & GYNECOLOGY

## 2022-04-11 PROCEDURE — 7100000011 HC PHASE II RECOVERY - ADDTL 15 MIN: Performed by: OBSTETRICS & GYNECOLOGY

## 2022-04-11 PROCEDURE — 7100000010 HC PHASE II RECOVERY - FIRST 15 MIN: Performed by: OBSTETRICS & GYNECOLOGY

## 2022-04-11 RX ORDER — SODIUM CHLORIDE 9 MG/ML
25 INJECTION, SOLUTION INTRAVENOUS PRN
Status: DISCONTINUED | OUTPATIENT
Start: 2022-04-11 | End: 2022-04-11 | Stop reason: HOSPADM

## 2022-04-11 RX ORDER — FENTANYL CITRATE 50 UG/ML
50 INJECTION, SOLUTION INTRAMUSCULAR; INTRAVENOUS EVERY 5 MIN PRN
Status: DISCONTINUED | OUTPATIENT
Start: 2022-04-11 | End: 2022-04-11 | Stop reason: HOSPADM

## 2022-04-11 RX ORDER — ESTRADIOL 1 MG/1
1 TABLET ORAL DAILY
Qty: 30 TABLET | Refills: 3 | Status: SHIPPED | OUTPATIENT
Start: 2022-04-11 | End: 2022-05-23 | Stop reason: SDUPTHER

## 2022-04-11 RX ORDER — MIDAZOLAM HYDROCHLORIDE 1 MG/ML
INJECTION INTRAMUSCULAR; INTRAVENOUS PRN
Status: DISCONTINUED | OUTPATIENT
Start: 2022-04-11 | End: 2022-04-11 | Stop reason: SDUPTHER

## 2022-04-11 RX ORDER — SODIUM CHLORIDE 0.9 % (FLUSH) 0.9 %
5-40 SYRINGE (ML) INJECTION PRN
Status: DISCONTINUED | OUTPATIENT
Start: 2022-04-11 | End: 2022-04-11 | Stop reason: HOSPADM

## 2022-04-11 RX ORDER — SODIUM CHLORIDE 0.9 % (FLUSH) 0.9 %
5-40 SYRINGE (ML) INJECTION EVERY 12 HOURS SCHEDULED
Status: DISCONTINUED | OUTPATIENT
Start: 2022-04-11 | End: 2022-04-11 | Stop reason: HOSPADM

## 2022-04-11 RX ORDER — SODIUM CHLORIDE 9 MG/ML
INJECTION, SOLUTION INTRAVENOUS PRN
Status: DISCONTINUED | OUTPATIENT
Start: 2022-04-11 | End: 2022-04-11 | Stop reason: HOSPADM

## 2022-04-11 RX ORDER — NEOSTIGMINE METHYLSULFATE 1 MG/ML
INJECTION, SOLUTION INTRAVENOUS PRN
Status: DISCONTINUED | OUTPATIENT
Start: 2022-04-11 | End: 2022-04-11 | Stop reason: SDUPTHER

## 2022-04-11 RX ORDER — SODIUM CHLORIDE 9 MG/ML
INJECTION INTRAVENOUS PRN
Status: DISCONTINUED | OUTPATIENT
Start: 2022-04-11 | End: 2022-04-11 | Stop reason: SDUPTHER

## 2022-04-11 RX ORDER — FENTANYL CITRATE 50 UG/ML
INJECTION, SOLUTION INTRAMUSCULAR; INTRAVENOUS PRN
Status: DISCONTINUED | OUTPATIENT
Start: 2022-04-11 | End: 2022-04-11 | Stop reason: SDUPTHER

## 2022-04-11 RX ORDER — FENTANYL CITRATE 50 UG/ML
50 INJECTION, SOLUTION INTRAMUSCULAR; INTRAVENOUS EVERY 5 MIN PRN
Status: COMPLETED | OUTPATIENT
Start: 2022-04-11 | End: 2022-04-11

## 2022-04-11 RX ORDER — ROPIVACAINE HYDROCHLORIDE 5 MG/ML
INJECTION, SOLUTION EPIDURAL; INFILTRATION; PERINEURAL PRN
Status: DISCONTINUED | OUTPATIENT
Start: 2022-04-11 | End: 2022-04-11 | Stop reason: SDUPTHER

## 2022-04-11 RX ORDER — ATROPA BELLADONNA AND OPIUM 16.2; 3 MG/1; MG/1
SUPPOSITORY RECTAL PRN
Status: DISCONTINUED | OUTPATIENT
Start: 2022-04-11 | End: 2022-04-11 | Stop reason: SDUPTHER

## 2022-04-11 RX ORDER — ROCURONIUM BROMIDE 10 MG/ML
INJECTION, SOLUTION INTRAVENOUS PRN
Status: DISCONTINUED | OUTPATIENT
Start: 2022-04-11 | End: 2022-04-11 | Stop reason: SDUPTHER

## 2022-04-11 RX ORDER — GLYCOPYRROLATE 1 MG/5 ML
SYRINGE (ML) INTRAVENOUS PRN
Status: DISCONTINUED | OUTPATIENT
Start: 2022-04-11 | End: 2022-04-11 | Stop reason: SDUPTHER

## 2022-04-11 RX ORDER — KETOROLAC TROMETHAMINE 10 MG/1
10 TABLET, FILM COATED ORAL EVERY 6 HOURS PRN
Qty: 20 TABLET | Refills: 0 | Status: SHIPPED | OUTPATIENT
Start: 2022-04-11 | End: 2022-04-26

## 2022-04-11 RX ORDER — HYDROCODONE BITARTRATE AND ACETAMINOPHEN 5; 325 MG/1; MG/1
1 TABLET ORAL EVERY 6 HOURS PRN
Qty: 10 TABLET | Refills: 0 | Status: SHIPPED | OUTPATIENT
Start: 2022-04-11 | End: 2022-04-16

## 2022-04-11 RX ORDER — ONDANSETRON 2 MG/ML
4 INJECTION INTRAMUSCULAR; INTRAVENOUS ONCE
Status: COMPLETED | OUTPATIENT
Start: 2022-04-11 | End: 2022-04-11

## 2022-04-11 RX ORDER — DEXAMETHASONE SODIUM PHOSPHATE 4 MG/ML
INJECTION, SOLUTION INTRA-ARTICULAR; INTRALESIONAL; INTRAMUSCULAR; INTRAVENOUS; SOFT TISSUE PRN
Status: DISCONTINUED | OUTPATIENT
Start: 2022-04-11 | End: 2022-04-11 | Stop reason: SDUPTHER

## 2022-04-11 RX ORDER — ONDANSETRON 2 MG/ML
INJECTION INTRAMUSCULAR; INTRAVENOUS PRN
Status: DISCONTINUED | OUTPATIENT
Start: 2022-04-11 | End: 2022-04-11 | Stop reason: SDUPTHER

## 2022-04-11 RX ORDER — KETOROLAC TROMETHAMINE 15 MG/ML
30 INJECTION, SOLUTION INTRAMUSCULAR; INTRAVENOUS ONCE
Status: COMPLETED | OUTPATIENT
Start: 2022-04-11 | End: 2022-04-11

## 2022-04-11 RX ORDER — PROPOFOL 10 MG/ML
INJECTION, EMULSION INTRAVENOUS PRN
Status: DISCONTINUED | OUTPATIENT
Start: 2022-04-11 | End: 2022-04-11 | Stop reason: SDUPTHER

## 2022-04-11 RX ORDER — LIDOCAINE HYDROCHLORIDE 20 MG/ML
INJECTION, SOLUTION EPIDURAL; INFILTRATION; INTRACAUDAL; PERINEURAL PRN
Status: DISCONTINUED | OUTPATIENT
Start: 2022-04-11 | End: 2022-04-11 | Stop reason: SDUPTHER

## 2022-04-11 RX ORDER — LABETALOL HYDROCHLORIDE 5 MG/ML
INJECTION, SOLUTION INTRAVENOUS PRN
Status: DISCONTINUED | OUTPATIENT
Start: 2022-04-11 | End: 2022-04-11 | Stop reason: SDUPTHER

## 2022-04-11 RX ORDER — METOCLOPRAMIDE HYDROCHLORIDE 5 MG/ML
10 INJECTION INTRAMUSCULAR; INTRAVENOUS ONCE
Status: COMPLETED | OUTPATIENT
Start: 2022-04-11 | End: 2022-04-11

## 2022-04-11 RX ORDER — DEXAMETHASONE SODIUM PHOSPHATE 10 MG/ML
INJECTION, SOLUTION INTRAMUSCULAR; INTRAVENOUS PRN
Status: DISCONTINUED | OUTPATIENT
Start: 2022-04-11 | End: 2022-04-11 | Stop reason: SDUPTHER

## 2022-04-11 RX ORDER — SODIUM CHLORIDE, SODIUM LACTATE, POTASSIUM CHLORIDE, CALCIUM CHLORIDE 600; 310; 30; 20 MG/100ML; MG/100ML; MG/100ML; MG/100ML
INJECTION, SOLUTION INTRAVENOUS CONTINUOUS
Status: DISCONTINUED | OUTPATIENT
Start: 2022-04-11 | End: 2022-04-11 | Stop reason: HOSPADM

## 2022-04-11 RX ORDER — HYDROCODONE BITARTRATE AND ACETAMINOPHEN 5; 325 MG/1; MG/1
1 TABLET ORAL ONCE
Status: COMPLETED | OUTPATIENT
Start: 2022-04-11 | End: 2022-04-11

## 2022-04-11 RX ORDER — DIMENHYDRINATE 50 MG/1
50 TABLET ORAL ONCE
Status: COMPLETED | OUTPATIENT
Start: 2022-04-11 | End: 2022-04-11

## 2022-04-11 RX ORDER — ACETAMINOPHEN 325 MG/1
650 TABLET ORAL ONCE
Status: COMPLETED | OUTPATIENT
Start: 2022-04-11 | End: 2022-04-11

## 2022-04-11 RX ADMIN — METOCLOPRAMIDE 10 MG: 5 INJECTION, SOLUTION INTRAMUSCULAR; INTRAVENOUS at 07:06

## 2022-04-11 RX ADMIN — FENTANYL CITRATE 50 MCG: 50 INJECTION INTRAMUSCULAR; INTRAVENOUS at 07:20

## 2022-04-11 RX ADMIN — ACETAMINOPHEN 650 MG: 325 TABLET ORAL at 07:08

## 2022-04-11 RX ADMIN — Medication 0.2 MG: at 08:16

## 2022-04-11 RX ADMIN — FENTANYL CITRATE 50 MCG: 50 INJECTION, SOLUTION INTRAMUSCULAR; INTRAVENOUS at 09:29

## 2022-04-11 RX ADMIN — LABETALOL HYDROCHLORIDE 10 MG: 5 INJECTION, SOLUTION INTRAVENOUS at 08:37

## 2022-04-11 RX ADMIN — ONDANSETRON 4 MG: 2 INJECTION INTRAMUSCULAR; INTRAVENOUS at 07:55

## 2022-04-11 RX ADMIN — ROCURONIUM BROMIDE 10 MG: 10 SOLUTION INTRAVENOUS at 08:38

## 2022-04-11 RX ADMIN — LABETALOL HYDROCHLORIDE 10 MG: 5 INJECTION, SOLUTION INTRAVENOUS at 08:32

## 2022-04-11 RX ADMIN — ONDANSETRON 4 MG: 2 INJECTION INTRAMUSCULAR; INTRAVENOUS at 07:06

## 2022-04-11 RX ADMIN — ENOXAPARIN SODIUM 40 MG: 100 INJECTION SUBCUTANEOUS at 07:10

## 2022-04-11 RX ADMIN — Medication 3 MG: at 08:49

## 2022-04-11 RX ADMIN — FENTANYL CITRATE 50 MCG: 50 INJECTION, SOLUTION INTRAMUSCULAR; INTRAVENOUS at 11:12

## 2022-04-11 RX ADMIN — SODIUM CHLORIDE 40 ML: 9 INJECTION INTRAMUSCULAR; INTRAVENOUS; SUBCUTANEOUS at 07:25

## 2022-04-11 RX ADMIN — KETOROLAC TROMETHAMINE 30 MG: 15 INJECTION, SOLUTION INTRAMUSCULAR; INTRAVENOUS at 10:50

## 2022-04-11 RX ADMIN — FENTANYL CITRATE 50 MCG: 50 INJECTION INTRAMUSCULAR; INTRAVENOUS at 07:15

## 2022-04-11 RX ADMIN — MIDAZOLAM 2 MG: 1 INJECTION INTRAMUSCULAR; INTRAVENOUS at 07:15

## 2022-04-11 RX ADMIN — LIDOCAINE HYDROCHLORIDE 80 MG: 20 INJECTION, SOLUTION EPIDURAL; INFILTRATION; INTRACAUDAL; PERINEURAL at 07:39

## 2022-04-11 RX ADMIN — HYDROCODONE BITARTRATE AND ACETAMINOPHEN 1 TABLET: 5; 325 TABLET ORAL at 11:39

## 2022-04-11 RX ADMIN — ROCURONIUM BROMIDE 50 MG: 10 SOLUTION INTRAVENOUS at 07:40

## 2022-04-11 RX ADMIN — FENTANYL CITRATE 50 MCG: 50 INJECTION, SOLUTION INTRAMUSCULAR; INTRAVENOUS at 09:34

## 2022-04-11 RX ADMIN — LABETALOL HYDROCHLORIDE 10 MG: 5 INJECTION, SOLUTION INTRAVENOUS at 08:28

## 2022-04-11 RX ADMIN — FENTANYL CITRATE 100 MCG: 50 INJECTION INTRAMUSCULAR; INTRAVENOUS at 08:11

## 2022-04-11 RX ADMIN — DEXAMETHASONE SODIUM PHOSPHATE 4 MG: 4 INJECTION, SOLUTION INTRAMUSCULAR; INTRAVENOUS at 07:55

## 2022-04-11 RX ADMIN — SODIUM CHLORIDE, POTASSIUM CHLORIDE, SODIUM LACTATE AND CALCIUM CHLORIDE: 600; 310; 30; 20 INJECTION, SOLUTION INTRAVENOUS at 07:25

## 2022-04-11 RX ADMIN — CEFAZOLIN SODIUM 2000 MG: 10 INJECTION, POWDER, FOR SOLUTION INTRAVENOUS at 07:31

## 2022-04-11 RX ADMIN — DIMENHYDRINATE 50 MG: 50 TABLET ORAL at 07:09

## 2022-04-11 RX ADMIN — Medication 0.4 MG: at 08:49

## 2022-04-11 RX ADMIN — Medication 2 MG: at 09:01

## 2022-04-11 RX ADMIN — DEXAMETHASONE SODIUM PHOSPHATE 10 MG: 10 INJECTION, SOLUTION INTRAMUSCULAR; INTRAVENOUS at 07:25

## 2022-04-11 RX ADMIN — ROPIVACAINE HYDROCHLORIDE 60 ML: 5 INJECTION, SOLUTION EPIDURAL; INFILTRATION; PERINEURAL at 07:25

## 2022-04-11 RX ADMIN — Medication 0.2 MG: at 09:01

## 2022-04-11 RX ADMIN — FAMOTIDINE 20 MG: 10 INJECTION INTRAVENOUS at 07:06

## 2022-04-11 RX ADMIN — ATROPA BELLADONNA AND OPIUM 30 MG: 16.2; 3 SUPPOSITORY RECTAL at 07:47

## 2022-04-11 RX ADMIN — PROPOFOL 200 MG: 10 INJECTION, EMULSION INTRAVENOUS at 07:39

## 2022-04-11 ASSESSMENT — PAIN SCALES - GENERAL
PAINLEVEL_OUTOF10: 6
PAINLEVEL_OUTOF10: 9
PAINLEVEL_OUTOF10: 5
PAINLEVEL_OUTOF10: 9
PAINLEVEL_OUTOF10: 5
PAINLEVEL_OUTOF10: 6
PAINLEVEL_OUTOF10: 9
PAINLEVEL_OUTOF10: 8
PAINLEVEL_OUTOF10: 0

## 2022-04-11 ASSESSMENT — PAIN DESCRIPTION - DESCRIPTORS
DESCRIPTORS: STABBING;CRAMPING
DESCRIPTORS: CRAMPING

## 2022-04-11 ASSESSMENT — PAIN DESCRIPTION - LOCATION
LOCATION: ABDOMEN
LOCATION: ABDOMEN

## 2022-04-11 ASSESSMENT — PAIN DESCRIPTION - PAIN TYPE
TYPE: SURGICAL PAIN
TYPE: ACUTE PAIN

## 2022-04-11 ASSESSMENT — PAIN DESCRIPTION - ORIENTATION: ORIENTATION: LOWER

## 2022-04-11 NOTE — OP NOTE
Preoperative diagnosis:   1. Chronic Pelvic Pain   2. Menorrhagia  3. Dysmenorrhea  4. Dyspareunia    Postoperative diagnosis: Same    Procedure:  Robotic-assisted Total Laparoscopic Hysterectomy with Bilateral Salpingo-oophorectomy    Surgeon: Dr. Nicci Arciniega    Asst.: Lg Stahl [place] PGY2    Anesthesia: Gen. Estimated blood loss: 25 mL    Fluids: LR    Urine: 50 mL of clear urine    Condition: Stable    Complications: None    Findings: The patient had an anteverted uterus which sounded to 8 cm in depth. Bilateral ureteral peristalsis was noted throughout the case and after closure of the vaginal cuff. Specimen removed: Uterus and Bilateral tubes and ovaries    Operative note: The patient was taken to the operating room where general anesthesia was obtained without difficulty. She was prepped and draped usual sterile fashion in a dorsal lithotomy position. Timeout was performed. A weighted speculum was placed in the patient's vagina and the anterior lip of the cervix was grasped with a single-tooth tenaculum. The cervix was progressively dilated and the uterus was sounded with the findings noted above. A V care uterine manipulator was then placed. A Khan catheter was placed and left to gravity drainage. At this point attention was turned to the patient's abdomen where a supraumbilical incision was made with a scalpel. A veress needle was then carefully introduced at a 45° angle while tenting the abdominal wall. Intraabdominal placement was confirmed by use of water-filled syringe and drop in intra-abdominal pressure with insufflation of CO2. Pneumoperitoneum was obtained to half liters of CO2. A robotic port was then placed without difficulty and intra-abdominal placement was confirmed by laparoscopy.   Second and third ports were then placed under direct visualization approximately 4 cm inferior and  5 cm lateral to the first.  8 mm robotic ports were placed in these locations. A fourth and final port was placed to the right. We placed a robotic port in this location. At this point the patient was placed into steep Trendelenburg position. The robot was brought into position and docked. The right arm was loaded with the scissors with monopolar cautery. The left side was loaded with the fenestrated bipolar. Attention was then turned to the console portion of the surgery. Beginning with the left cornual region of uterus, the infundibulopelvic ligament pedicle were ligated with the fenestrated bipolar and transected with the scissors. Dissection continued along the mesosalpinx of the broad ligament just lateral to the tube until the round ligament was ligated and transected. The anterior uterine serosa was then undermined and taken down to free the bladder from the lower uterine segment and cervix. This was done working from the left side to the midline. Attention was then turned to the right cornual region of the uterus and in a similar manner the tube and ovary were ligated and transected. Dissection again continued along the broad ligament until the round ligament was ligated and transected. The remainder of the bladder flap was then taken down. Once the bladder was freed from the lower uterine segment and cervix, the uterine arteries were skeletonized, ligated, and transected bilaterally. Using the scissors, the uterus was amputated just beneath the cervix using the groove of the V care as a guide. The uterus, tube, and ovaries were then withdrawn into the vagina to maintain pneumoperitoneum. The vaginal cuff was then closed with V lock suture working from right to left using 2-0 suture. Copious amounts of irrigation were then used to evaluate the cuff and pedicles to assure hemostasis. [place]ri[place] was placed over the vaginal cuff for continued hemostasis. Bilateral ureteral peristalsis was again noted.   At this point the instruments removed from the abdomen. The skin incisions were closed with 4-0 Monocryl in a subcuticular fashion. The uterus and bilateral tubes and ovaries were handed off to pathology. The vaginal cuff was then examined with no evidence of bleeding. The Khan catheter was removed. Sponge, lap, needle and instrument counts were correct ×2 and the patient was taken to the recovery area in apparently stable condition. Surgical Assistant documentation:    An assistant surgeon was required with this surgery. She was able to provide the necessary visualization, uterine manipulation, and suturing. Having her available allowed this case to be perfomed in a safe and timely manner.

## 2022-04-11 NOTE — H&P
HISTORY AND PHYSICAL             Date: 4/11/2022        Patient Name: Josiane Eaton     YOB: 1995      Age:  28 y.o. Chief Complaint   No chief complaint on file. History Obtained From   patient    History of Present Illness   Pt with long history of heavy menstrual cycles and pelvic pain; was planning to have hysterectomy last year but surprisingly became pregnant and had to cancel surgery; little boy is doing great - she is breast feeding; the pain is back since the birth of her son just as it was before; pain almost daily causes her to sit down due to intensity; hx painful intercourse as well    Past Medical History     Past Medical History:   Diagnosis Date    Deafness     Ganglion cyst of wrist 2010    left    Gestational hypertension without significant proteinuria, antepartum     Hereditary nephropathy (Alport's) as achild     had renal biopsy    Hypertension     Irritable bowel syndrome     Kidney disease     Microscopic hematuria     Obesity     Proteinuria     Sensorineural hearing loss     Thin basement membrane disease     Bx proven        Past Surgical History     Past Surgical History:   Procedure Laterality Date    COLONOSCOPY  2009    Dr. Dago Miller Left 8/18/2015    CYSTOSCOPY  11/14/2018    with stent placement    KIDNEY BIOPSY  12/29/98    LAPAROSCOPY      [place] CYSTO/URETERO W/LITHOTRIPSY &INDWELL STENT INSRT Right 11/14/2018    CYSTOSCOPY URETEROSCOPY LASER, HLL performed by [facility] at [facility] &[facility]WE[facility] STENT INSRT Right 11/14/2018    CYSTOSCOPY STENT INSERTION performed by Katerina Gonzalez MD at [address] [place] [address] URETEROSCOPY  8-    WISDOM TOOTH EXTRACTION      WRIST SURGERY  2010    cyst removal        Medications Prior to Admission     Prior to Admission medications    Medication Sig Start Date End Date Taking?  Authorizing Provider   Prenatal Vit-Fe Fumarate-FA (PRENATAL VITAMIN PO) Take by mouth    Historical Provider, MD        Allergies   Patient has no known allergies. Social History     Social History     Tobacco History     Smoking Status  Former Smoker Quit date  3/18/2014 Smoking Tobacco Type  Cigarettes    Smokeless Tobacco Use  Never Used    Tobacco Comment  several cigarettes daily          Alcohol History     Alcohol Use Status  No          Drug Use     Drug Use Status  No          Sexual Activity     Sexually Active  Not Currently Partners  Male                Family History     Family History   Problem Relation Age of Onset    Cancer Paternal Grandmother         bone    High Cholesterol Mother     Cancer Mother         breast    High Blood Pressure Mother     Migraines Mother     Alcohol Abuse Father     Anxiety Disorder Father     Depression Father     Hearing Loss Brother     Hearing Loss Brother        Review of Systems   Review of Systems   Constitutional: Negative for chills, fatigue and fever. Genitourinary: Positive for dyspareunia, menstrual problem and pelvic pain. Negative for vaginal discharge. Physical Exam   /79   Pulse 67   Temp 97 °F (36.1 °C) (Temporal)   Resp 18   Ht 5' 6\" (1.676 m)   Wt 250 lb (113.4 kg)   LMP 09/01/2020   SpO2 96%   Breastfeeding Yes   BMI 40.35 kg/m²     Physical Exam  Constitutional:       Appearance: Normal appearance. HENT:      Head: Normocephalic and atraumatic. Eyes:      Extraocular Movements: Extraocular movements intact. Pupils: Pupils are equal, round, and reactive to light. Cardiovascular:      Rate and Rhythm: Normal rate. Pulmonary:      Effort: Pulmonary effort is normal.   Musculoskeletal:         General: Normal range of motion. Cervical back: Normal range of motion. Skin:     General: Skin is warm and dry. Neurological:      Mental Status: She is alert and oriented to person, place, and time.    Psychiatric:         Mood and Affect: Mood normal.         Behavior: Behavior normal. Thought Content: Thought content normal.         Judgment: Judgment normal.         Labs      Recent Results (from the past 24 hour(s))   Pregnancy, Urine    Collection Time: 04/11/22  6:36 AM   Result Value Ref Range    HCG(Urine) Pregnancy Test NEGATIVE NEGATIVE        Imaging/Diagnostics Last 24 Hours   No results found. Assessment    1. Dysmenorrhea  2.  Pelvic pain    Plan   1. RA SUNNY, BSO    Consultations Ordered:  None    Electronically signed by Areli Velazquez PA-C on 4/11/22 at 7:24 AM EDT

## 2022-04-11 NOTE — ANESTHESIA PRE PROCEDURE
Department of Anesthesiology  Preprocedure Note       Name:  Fidel Mcmahon   Age:  28 y.o.  :  1995                                          MRN:  [id number]         Date:  2022      Surgeon: Nuno Marrufo):  Rhiannon Rivera DO    Procedure: Procedure(s): HYSTERECTOMY VAGINAL LAPAROSCOPIC ROBOTIC ASSISTED-POSSIBLE BSO, POSSIBLE LAPAROSCOPIC COLPOPEXY    Medications prior to admission:   Prior to Admission medications    Medication Sig Start Date End Date Taking? Authorizing Provider   Prenatal Vit-Fe Fumarate-FA (PRENATAL VITAMIN PO) Take by mouth    Historical Provider, MD       Current medications:    No current facility-administered medications for this visit. No current outpatient medications on file.      Facility-Administered Medications Ordered in Other Visits   Medication Dose Route Frequency Provider Last Rate Last Admin    dimenhyDRINATE (DRAMAMINE) tablet 50 mg  50 mg Oral Once Mandie Ham, APRN - CRNA        acetaminophen (TYLENOL) tablet 650 mg  650 mg Oral Once Mandie Ham, APRN - CRNA        lactated ringers infusion   IntraVENous Continuous Mandie Ham, APRN - CRNA        metoclopramide (REGLAN) injection 10 mg  10 mg IntraVENous Once Mandie Ham, APRN - CRNA        ondansetron [facility]) injection 4 mg  4 mg IntraVENous Once Mandie Ham, APRN - CRNA        famotidine (PEPCID) injection 20 mg  20 mg IntraVENous Once Mandie Ham, APRN - CRNA        sodium chloride flush 0.9 % injection 5-40 mL  5-40 mL IntraVENous 2 times per day Niraj Infante PA-C        sodium chloride flush 0.9 % injection 5-40 mL  5-40 mL IntraVENous PRN Niraj Infante PA-C        0.9 % sodium chloride infusion  25 mL IntraVENous PRN Niraj Infante PA-C        ceFAZolin (ANCEF) 2000 mg in dextrose 5 % 100 mL IVPB  2,000 mg IntraVENous On Call to Chayito Gastelum PA-C        enoxaparin (LOVENOX) injection 40 mg  40 mg SubCUTAneous Once Sesar Brady Gosia Degroot PA-C           Allergies:  No Known Allergies    Problem List:    Patient Active Problem List   Diagnosis Code    Thin basement membrane disease N02.9    Rh negative status during pregnancy O26.899, Z67.91    Deaf, nonspeaking H91.3    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) Q87.81     (normal spontaneous vaginal delivery) O80    Renal calculus, right N20.0    Ureteral calculus, left N20.1    Left flank pain R10.9    Nervousness / Depression symptoms R45.0    Adenomyosis N80.0    Ureteral calculus N20.1    Viral URI with cough J06.9    Continuous RLQ abdominal pain R10.31    Kidney stone on right side N20.0    Term pregnancy Z34.90    Gestational hypertension without significant proteinuria, antepartum O13.9       Past Medical History:        Diagnosis Date    Deafness     Ganglion cyst of wrist     left    Gestational hypertension without significant proteinuria, antepartum     Hereditary nephropathy (Alport's) as achild     had renal biopsy    Hypertension     Irritable bowel syndrome     Kidney disease     Microscopic hematuria     Obesity     Proteinuria     Sensorineural hearing loss     Thin basement membrane disease     Bx proven       Past Surgical History:        Procedure Laterality Date    COLONOSCOPY      Dr. Nadia Nelson Left 2015    CYSTOSCOPY  2018    with stent placement    KIDNEY BIOPSY  98    LAPAROSCOPY      [place] CYSTO/URETERO W/LITHOTRIPSY &INDWELL STENT INSRT Right 2018    CYSTOSCOPY URETEROSCOPY LASER, HLL performed by Ravi Kwon MD at [facility] &[facility]WE[facility] STENT INSRT Right 2018    CYSTOSCOPY STENT INSERTION performed by Ravi Kwon MD at [address] [place] [address] URETEROSCOPY  2015    WISDOM TOOTH EXTRACTION      WRIST SURGERY      cyst removal       Social History:    Social History     Tobacco Use    Smoking status: Former Smoker     Types: Cigarettes     Quit date: 3/18/2014     Years since quittin.0    Smokeless tobacco: Never Used    Tobacco comment: several cigarettes daily   Substance Use Topics    Alcohol use: No     Alcohol/week: 0.0 standard drinks                                Counseling given: Not Answered  Comment: several cigarettes daily      Vital Signs (Current): There were no vitals filed for this visit. BP Readings from Last 3 Encounters:   22 136/79   22 124/80   22 128/84       NPO Status:                                                                                 BMI:   Wt Readings from Last 3 Encounters:   22 250 lb (113.4 kg)   22 253 lb (114.8 kg)   22 255 lb (115.7 kg)     There is no height or weight on file to calculate BMI.    CBC:   Lab Results   Component Value Date    WBC 6.7 2021    RBC 3.61 2021    HGB 13.0 2021    HGB 10.8 2021    HCT 32.7 2021    MCV 90.6 2021    RDW 12.6 2021     2021       CMP:   Lab Results   Component Value Date     2021    K 3.6 2021     2021    CO2 19 2021    BUN 8 2021    CREATININE 0.64 2021    GFRAA >60 2021    LABGLOM >60 2021    GLUCOSE 92 2021    PROT 6.2 2021    CALCIUM 9.1 2021    BILITOT 0.30 2021    ALKPHOS 208 2021    AST 22 2021    ALT 24 2021       POC Tests: No results for input(s): POCGLU, POCNA, POCK, POCCL, POCBUN, POCHEMO, POCHCT in the last 72 hours.     Coags: No results found for: PROTIME, INR, APTT    HCG (If Applicable):   Lab Results   Component Value Date    PREGTESTUR NEGATIVE 2022    HCGQUANT 14,005 (H) 2021        ABGs: No results found for: PHART, PO2ART, RLS9IBU, ZKR1INR, BEART, P3XMKERN     Type & Screen (If Applicable):  No results found for: LABABO, [address]    Drug/Infectious Status (If Applicable):  Lab Results   Component Value Date    HEPCAB NONREACTIVE 11/09/2020       COVID-19 Screening (If Applicable): No results found for: COVID19        Anesthesia Evaluation  Patient summary reviewed and Nursing notes reviewed no history of anesthetic complications:   Airway: Mallampati: III  TM distance: >3 FB   Neck ROM: full  Mouth opening: > = 3 FB Dental: normal exam         Pulmonary:Negative Pulmonary ROS and normal exam  breath sounds clear to auscultation                             Cardiovascular:Negative CV ROS  Exercise tolerance: good (>4 METS),       (-) hypertension (PIH)      Rhythm: regular  Rate: normal           Beta Blocker:  Dose within 24 Hrs         Neuro/Psych:   (+) depression/anxiety    (-) psychiatric history            ROS comment: Patient is Deaf. GI/Hepatic/Renal:   (+) renal disease (Labs checked and WNL.):, morbid obesity     (-) PUD       Endo/Other: Negative Endo/Other ROS                    Abdominal:   (+) obese,           Vascular: negative vascular ROS. Other Findings:             Anesthesia Plan      general     ASA 3       Induction: intravenous. MIPS: Postoperative opioids intended and Prophylactic antiemetics administered. Anesthetic plan and risks discussed with patient. Plan discussed with CRNA. Preoperative note completed with  from [employer] ( Giorgio Velazco).     KEERTHI Serna - CRNA   4/11/2022

## 2022-04-11 NOTE — ANESTHESIA PROCEDURE NOTES
Peripheral Block    Patient location during procedure: pre-op  Start time: 4/11/2022 7:15 AM  End time: 4/11/2022 7:25 AM  Staffing  Performed: resident/CRNA   Resident/CRNA: KEERTHI Castillo CRNA  Preanesthetic Checklist  Completed: patient identified, IV checked, site marked, risks and benefits discussed, surgical consent, monitors and equipment checked, pre-op evaluation, timeout performed, anesthesia consent given, oxygen available and patient being monitored  Peripheral Block  Patient position: supine  Prep: ChloraPrep  Patient monitoring: continuous pulse ox and IV access  Block type: TAP and Rectus sheath  Laterality: bilateral  Injection technique: single-shot  Guidance: ultrasound guided  Local infiltration: ropivacaine and decadron (See MAR for details.)  Provider prep: mask and sterile gloves  Local infiltration: ropivacaine and decadron (See MAR for details.)  Needle  Needle type:  Other   Needle gauge: 22 G  Needle length: 11 cm  Needle localization: ultrasound guidanceOther needle type: pajunk  Assessment  Injection assessment: no paresthesia on injection, local visualized surrounding nerve on ultrasound and negative aspiration for heme  Paresthesia pain: none  Slow fractionated injection: yes  Hemodynamics: stable  Reason for block: post-op pain management and at surgeon's request

## 2022-04-11 NOTE — PROGRESS NOTES
Patient  remains with patient. Patient states pain tolerable and ready to be discharged at this time. Discharge instructions given to pt and aunt. Both verbalize understanding,deny any questions and/or concerns. Pt transfer off unit in wheelchair w/ all belongings. Discharge Criteria    Inpatients must meet Criteria 1 through 7. All other patients are either YES or N/A. If a NO is chosen then Anesthesia or Surgeon must be notified. 1.  Minimum 30 minutes after last dose of sedative medication, minimum 120 minutes after last dose of reversal agent. Yes      2. Systolic BP stable within 20 mmHg for 30 minutes & systolic BP between 90 & 141 or within 10 mmHg of baseline. Yes      3. Pulse between 60 and 100 or within 10 bpm of baseline. Yes      4. Spontaneous respiratory rate >/= 10 per minute. Yes      5. SaO2 >/= 95 or  >/= baseline. Yes      6. Able to cough and swallow or return to baseline function. Yes      7. Alert and oriented or return to baseline mental status. Yes      8. Demonstrates controlled, coordinated movements, ambulates with steady gait, or return to baseline activity function. Yes      9. Minimal or no pain or nausea, or at a level tolerable and acceptable to patient. Yes      10. Takes and retains oral fluids as allowed. Yes      11. Procedural / perioperative site stable. Minimal or no bleeding. Yes          12. If GI endoscopy procedure, minimal or no abdominal distention or passing flatus. N/A      13. Written discharge instructions and emergency telephone number provided. Yes      14. Accompanied by a responsible adult.     Yes

## 2022-04-11 NOTE — ANESTHESIA POSTPROCEDURE EVALUATION
Department of Anesthesiology  Postprocedure Note    Patient: Lindsey Post  MRN: [id number]  YOB: 1995  Date of evaluation: 4/11/2022  Time:  1:58 PM     Procedure Summary     Date: 04/11/22 Room / Location: [address]    Anesthesia Start: 8902 Anesthesia Stop: 9400    Procedure: HYSTERECTOMY VAGINAL LAPAROSCOPIC ROBOTIC ASSISTED- BSO (N/A ) Diagnosis: (DYSEMNORRHEA, PELVIC PAIN)    Surgeons: Loren Schneider DO Responsible Provider: KEERTHI Figueroa CRNA    Anesthesia Type: general ASA Status: 3          Anesthesia Type: general    Azeb Phase I: Azeb Score: 9    Azeb Phase II: Azeb Score: 10    Last vitals: Reviewed and per EMR flowsheets.        Anesthesia Post Evaluation    Patient location during evaluation: bedside  Patient participation: complete - patient participated  Level of consciousness: awake and alert  Airway patency: patent  Nausea & Vomiting: no nausea and no vomiting  Complications: no  Cardiovascular status: hemodynamically stable  Respiratory status: acceptable  Hydration status: stable

## 2022-04-13 LAB — SURGICAL PATHOLOGY REPORT: NORMAL

## 2022-04-17 ENCOUNTER — HOSPITAL ENCOUNTER (INPATIENT)
Age: 27
LOS: 2 days | Discharge: HOME | DRG: 921 | End: 2022-04-19
Attending: STUDENT IN AN ORGANIZED HEALTH CARE EDUCATION/TRAINING PROGRAM | Admitting: OBSTETRICS & GYNECOLOGY
Payer: MEDICARE

## 2022-04-17 ENCOUNTER — APPOINTMENT (OUTPATIENT)
Dept: CT IMAGING | Age: 27
DRG: 921 | End: 2022-04-17
Payer: MEDICARE

## 2022-04-17 DIAGNOSIS — R10.84 GENERALIZED ABDOMINAL PAIN: Primary | ICD-10-CM

## 2022-04-17 PROBLEM — R10.9 ABDOMINAL PAIN: Status: ACTIVE | Noted: 2022-04-17

## 2022-04-17 LAB
ABO/RH: NORMAL
ABSOLUTE EOS #: 0.09 K/UL (ref 0–0.44)
ABSOLUTE IMMATURE GRANULOCYTE: 0.03 K/UL (ref 0–0.3)
ABSOLUTE LYMPH #: 1.08 K/UL (ref 1.1–3.7)
ABSOLUTE MONO #: 0.78 K/UL (ref 0.1–1.2)
ALBUMIN SERPL-MCNC: 4.4 G/DL (ref 3.5–5.2)
ALBUMIN/GLOBULIN RATIO: 1.6 (ref 1–2.5)
ALP BLD-CCNC: 99 U/L (ref 35–104)
ALT SERPL-CCNC: 22 U/L (ref 5–33)
ANION GAP SERPL CALCULATED.3IONS-SCNC: 13 MMOL/L (ref 9–17)
ANTIBODY SCREEN: NEGATIVE
ARM BAND NUMBER: NORMAL
AST SERPL-CCNC: 20 U/L
BASOPHILS # BLD: 0 % (ref 0–2)
BASOPHILS ABSOLUTE: 0.03 K/UL (ref 0–0.2)
BILIRUB SERPL-MCNC: 0.71 MG/DL (ref 0.3–1.2)
BILIRUBIN URINE: NEGATIVE
BUN BLDV-MCNC: 17 MG/DL (ref 6–20)
BUN/CREAT BLD: 21 (ref 9–20)
CALCIUM SERPL-MCNC: 9.4 MG/DL (ref 8.6–10.4)
CHLORIDE BLD-SCNC: 101 MMOL/L (ref 98–107)
CO2: 24 MMOL/L (ref 20–31)
COLOR: YELLOW
CREAT SERPL-MCNC: 0.81 MG/DL (ref 0.5–0.9)
EOSINOPHILS RELATIVE PERCENT: 1 % (ref 1–4)
EXPIRATION DATE: NORMAL
GFR AFRICAN AMERICAN: >60 ML/MIN
GFR NON-AFRICAN AMERICAN: >60 ML/MIN
GFR SERPL CREATININE-BSD FRML MDRD: ABNORMAL ML/MIN/{1.73_M2}
GFR SERPL CREATININE-BSD FRML MDRD: ABNORMAL ML/MIN/{1.73_M2}
GLUCOSE BLD-MCNC: 126 MG/DL (ref 70–99)
GLUCOSE URINE: NEGATIVE
HCT VFR BLD CALC: 35.7 % (ref 36.3–47.1)
HEMOGLOBIN: 12 G/DL (ref 11.9–15.1)
IMMATURE GRANULOCYTES: 0 %
INR BLD: 1.1
KETONES, URINE: NEGATIVE
LACTIC ACID, SEPSIS: 1.7 MMOL/L (ref 0.5–1.9)
LEUKOCYTE ESTERASE, URINE: NEGATIVE
LIPASE: 21 U/L (ref 13–60)
LYMPHOCYTES # BLD: 13 % (ref 24–43)
MCH RBC QN AUTO: 30.7 PG (ref 25.2–33.5)
MCHC RBC AUTO-ENTMCNC: 33.6 G/DL (ref 28.4–34.8)
MCV RBC AUTO: 91.3 FL (ref 82.6–102.9)
MONOCYTES # BLD: 10 % (ref 3–12)
NITRITE, URINE: NEGATIVE
NRBC AUTOMATED: 0 PER 100 WBC
PARTIAL THROMBOPLASTIN TIME: 29.6 SEC (ref 26.8–34.8)
PDW BLD-RTO: 12.1 % (ref 11.8–14.4)
PH UA: 6.5 (ref 5–9)
PLATELET # BLD: 296 K/UL (ref 138–453)
PMV BLD AUTO: 9.9 FL (ref 8.1–13.5)
POTASSIUM SERPL-SCNC: 3.9 MMOL/L (ref 3.7–5.3)
PROTEIN UA: NEGATIVE
PROTHROMBIN TIME: 13.7 SEC (ref 11.5–14.2)
RBC # BLD: 3.91 M/UL (ref 3.95–5.11)
SEG NEUTROPHILS: 76 % (ref 36–65)
SEGMENTED NEUTROPHILS ABSOLUTE COUNT: 6.13 K/UL (ref 1.5–8.1)
SODIUM BLD-SCNC: 138 MMOL/L (ref 135–144)
SPECIFIC GRAVITY UA: 1.02 (ref 1.01–1.02)
TOTAL PROTEIN: 7.2 G/DL (ref 6.4–8.3)
TURBIDITY: CLEAR
URINE HGB: NEGATIVE
UROBILINOGEN, URINE: NORMAL
WBC # BLD: 8.1 K/UL (ref 3.5–11.3)

## 2022-04-17 PROCEDURE — 87040 BLOOD CULTURE FOR BACTERIA: CPT

## 2022-04-17 PROCEDURE — 85730 THROMBOPLASTIN TIME PARTIAL: CPT

## 2022-04-17 PROCEDURE — 2580000003 HC RX 258: Performed by: OBSTETRICS & GYNECOLOGY

## 2022-04-17 PROCEDURE — 83690 ASSAY OF LIPASE: CPT

## 2022-04-17 PROCEDURE — 1200000000 HC SEMI PRIVATE

## 2022-04-17 PROCEDURE — 86901 BLOOD TYPING SEROLOGIC RH(D): CPT

## 2022-04-17 PROCEDURE — 6370000000 HC RX 637 (ALT 250 FOR IP): Performed by: STUDENT IN AN ORGANIZED HEALTH CARE EDUCATION/TRAINING PROGRAM

## 2022-04-17 PROCEDURE — 80053 COMPREHEN METABOLIC PANEL: CPT

## 2022-04-17 PROCEDURE — 6360000004 HC RX CONTRAST MEDICATION: Performed by: STUDENT IN AN ORGANIZED HEALTH CARE EDUCATION/TRAINING PROGRAM

## 2022-04-17 PROCEDURE — 86900 BLOOD TYPING SEROLOGIC ABO: CPT

## 2022-04-17 PROCEDURE — 81003 URINALYSIS AUTO W/O SCOPE: CPT

## 2022-04-17 PROCEDURE — 2580000003 HC RX 258: Performed by: STUDENT IN AN ORGANIZED HEALTH CARE EDUCATION/TRAINING PROGRAM

## 2022-04-17 PROCEDURE — 99283 EMERGENCY DEPT VISIT LOW MDM: CPT

## 2022-04-17 PROCEDURE — 86850 RBC ANTIBODY SCREEN: CPT

## 2022-04-17 PROCEDURE — 83605 ASSAY OF LACTIC ACID: CPT

## 2022-04-17 PROCEDURE — 85610 PROTHROMBIN TIME: CPT

## 2022-04-17 PROCEDURE — 85025 COMPLETE CBC W/AUTO DIFF WBC: CPT

## 2022-04-17 PROCEDURE — 74177 CT ABD & PELVIS W/CONTRAST: CPT

## 2022-04-17 PROCEDURE — 6370000000 HC RX 637 (ALT 250 FOR IP): Performed by: OBSTETRICS & GYNECOLOGY

## 2022-04-17 PROCEDURE — 36415 COLL VENOUS BLD VENIPUNCTURE: CPT

## 2022-04-17 PROCEDURE — 87086 URINE CULTURE/COLONY COUNT: CPT

## 2022-04-17 RX ORDER — SODIUM CHLORIDE, SODIUM LACTATE, POTASSIUM CHLORIDE, CALCIUM CHLORIDE 600; 310; 30; 20 MG/100ML; MG/100ML; MG/100ML; MG/100ML
INJECTION, SOLUTION INTRAVENOUS CONTINUOUS
Status: DISCONTINUED | OUTPATIENT
Start: 2022-04-17 | End: 2022-04-17

## 2022-04-17 RX ORDER — DEXTROSE, SODIUM CHLORIDE, SODIUM LACTATE, POTASSIUM CHLORIDE, AND CALCIUM CHLORIDE 5; .6; .31; .03; .02 G/100ML; G/100ML; G/100ML; G/100ML; G/100ML
INJECTION, SOLUTION INTRAVENOUS CONTINUOUS
Status: DISCONTINUED | OUTPATIENT
Start: 2022-04-17 | End: 2022-04-19 | Stop reason: HOSPADM

## 2022-04-17 RX ORDER — 0.9 % SODIUM CHLORIDE 0.9 %
1000 INTRAVENOUS SOLUTION INTRAVENOUS ONCE
Status: COMPLETED | OUTPATIENT
Start: 2022-04-17 | End: 2022-04-17

## 2022-04-17 RX ORDER — ACETAMINOPHEN 500 MG
1000 TABLET ORAL ONCE
Status: COMPLETED | OUTPATIENT
Start: 2022-04-17 | End: 2022-04-17

## 2022-04-17 RX ORDER — HYDROCODONE BITARTRATE AND ACETAMINOPHEN 5; 325 MG/1; MG/1
1 TABLET ORAL EVERY 6 HOURS PRN
Status: ON HOLD | COMMUNITY
End: 2022-04-19 | Stop reason: HOSPADM

## 2022-04-17 RX ORDER — ZOLPIDEM TARTRATE 5 MG/1
5 TABLET ORAL NIGHTLY PRN
Status: DISCONTINUED | OUTPATIENT
Start: 2022-04-17 | End: 2022-04-19 | Stop reason: HOSPADM

## 2022-04-17 RX ORDER — HYDROCODONE BITARTRATE AND ACETAMINOPHEN 5; 325 MG/1; MG/1
1 TABLET ORAL EVERY 4 HOURS PRN
Status: DISCONTINUED | OUTPATIENT
Start: 2022-04-17 | End: 2022-04-19 | Stop reason: HOSPADM

## 2022-04-17 RX ADMIN — SODIUM CHLORIDE, SODIUM LACTATE, POTASSIUM CHLORIDE, CALCIUM CHLORIDE AND DEXTROSE MONOHYDRATE: 5; 600; 310; 30; 20 INJECTION, SOLUTION INTRAVENOUS at 23:24

## 2022-04-17 RX ADMIN — ZOLPIDEM TARTRATE 5 MG: 5 TABLET ORAL at 23:16

## 2022-04-17 RX ADMIN — ACETAMINOPHEN 1000 MG: 500 TABLET, FILM COATED ORAL at 18:56

## 2022-04-17 RX ADMIN — SODIUM CHLORIDE 1000 ML: 9 INJECTION, SOLUTION INTRAVENOUS at 19:19

## 2022-04-17 RX ADMIN — HYDROCODONE BITARTRATE AND ACETAMINOPHEN 1 TABLET: 5; 325 TABLET ORAL at 22:57

## 2022-04-17 RX ADMIN — IOPAMIDOL 75 ML: 755 INJECTION, SOLUTION INTRAVENOUS at 18:59

## 2022-04-17 ASSESSMENT — ENCOUNTER SYMPTOMS
VOMITING: 1
NAUSEA: 1
CONSTIPATION: 0
DIARRHEA: 0
RHINORRHEA: 0
BLOOD IN STOOL: 0
ABDOMINAL PAIN: 1
SHORTNESS OF BREATH: 0

## 2022-04-17 ASSESSMENT — PAIN SCALES - GENERAL
PAINLEVEL_OUTOF10: 7
PAINLEVEL_OUTOF10: 8

## 2022-04-17 ASSESSMENT — PAIN DESCRIPTION - PAIN TYPE: TYPE: ACUTE PAIN

## 2022-04-17 ASSESSMENT — PAIN - FUNCTIONAL ASSESSMENT: PAIN_FUNCTIONAL_ASSESSMENT: FACES

## 2022-04-17 ASSESSMENT — PAIN SCALES - WONG BAKER: WONGBAKER_NUMERICALRESPONSE: 8

## 2022-04-17 ASSESSMENT — PAIN DESCRIPTION - LOCATION: LOCATION: ABDOMEN

## 2022-04-17 NOTE — ED PROVIDER NOTES
[facility]  Emergency Department Encounter  EmergencyMedicine      Pt Name:Pretty Lawson  MRN: [id number]  [place] 1995  Date of evaluation: 4/17/22  PCP:  Markel Farias MD    [address]       Chief Complaint   Patient presents with    Wound Check     bleeding from hyster site onset 1pm today with increased pain, emesis x2 and diarrhea   Pain and bleeding from incision site. HISTORY OF PRESENT ILLNESS  (Location/Symptom, Timing/Onset, Context/Setting, Quality, Duration, Modifying Factors, Severity.)      Basil Stein is a 28 y.o. female who presents with acute onset of severe abdominal pain associated with bleeding from her laparoscopic incision site. Patient is status post hysterectomy 6 days ago. At approximately 1300 today she had sudden onset of abdominal pain in bleeding from the site. Symptoms have persisted she has vomited twice nonbilious nonbloody. No diarrhea constipation or urinary symptoms has been able to void since the onset of her symptoms. No trauma. Bleeding is currently controlled. Patient is having \"hot flashes\" and subjective fevers currently afebrile. Onset: 1 pm  Provocation: unclear  Quality: sharp  Radiation: throughout abdomen  Severity: mod/severe  Timing: constant  Interventions: none pta    PAST MEDICAL / SURGICAL / SOCIAL / FAMILY HISTORY     Past medical history, surgical history, social history, family history as well as patient's allergies and home medications were reviewed. has a past medical history of Deafness, Ganglion cyst of wrist, Gestational hypertension without significant proteinuria, antepartum, Hereditary nephropathy (Alport's), Hypertension, Irritable bowel syndrome, Kidney disease, Microscopic hematuria, Obesity, PONV (postoperative nausea and vomiting), Proteinuria, Sensorineural hearing loss, and Thin basement membrane disease. has a past surgical history that includes Kidney biopsy (12/29/98);  Wrist surgery (); [place] tooth extraction; Cystoscopy (Left, 2015); Ureteroscopy (2015); Colonoscopy (); laparoscopy; Cystoscopy (2018); pr cysto/uretero w/lithotripsy &indwell stent insrt (Right, 2018); pr cysto/uretero w/lithotripsy &indwell stent insrt (Right, 2018); and Hysterectomy, vaginal (N/A, 2022). Social History     Socioeconomic History    Marital status: Single     Spouse name: Not on file    Number of children: Not on file    Years of education: Not on file    Highest education level: Not on file   Occupational History    Not on file   Tobacco Use    Smoking status: Former Smoker     Types: Cigarettes     Quit date: 3/18/2014     Years since quittin.0    Smokeless tobacco: Never Used    Tobacco comment: several cigarettes daily   Vaping Use    Vaping Use: Never used   Substance and Sexual Activity    Alcohol use: No     Alcohol/week: 0.0 standard drinks    Drug use: No    Sexual activity: Not Currently     Partners: Male   Other Topics Concern    Not on file   Social History Narrative    Not on file     Social Determinants of Health     Financial Resource Strain:     Difficulty of Paying Living Expenses: Not on file   Food Insecurity:     Worried About Running Out of Food in the Last Year: Not on file    Stoney of Food in the Last Year: Not on file   Transportation Needs:     Lack of Transportation (Medical): Not on file    Lack of Transportation (Non-Medical):  Not on file   Physical Activity:     Days of Exercise per Week: Not on file    Minutes of Exercise per Session: Not on file   Stress:     Feeling of Stress : Not on file   Social Connections:     Frequency of Communication with Friends and Family: Not on file    Frequency of Social Gatherings with Friends and Family: Not on file    Attends Baptism Services: Not on file    Active Member of Clubs or Organizations: Not on file    Attends Club or Organization Meetings: Not on file   [place]qu[place] Marital Status: Not on file   Intimate Partner Violence:     Fear of Current or Ex-Partner: Not on file    Emotionally Abused: Not on file    Physically Abused: Not on file    Sexually Abused: Not on file   Housing Stability:     Unable to Pay for Housing in the Last Year: Not on file    Number of Places Lived in the Last Year: Not on file    Unstable Housing in the Last Year: Not on file       Family History   Problem Relation Age of Onset    Cancer Paternal Grandmother         bone    High Cholesterol Mother     Cancer Mother         breast    High Blood Pressure Mother     Migraines Mother     Alcohol Abuse Father     Anxiety Disorder Father     Depression Father     Hearing Loss Brother     Hearing Loss Brother        Allergies:  Patient has no known allergies. Home Medications:  Prior to Admission medications    Medication Sig Start Date End Date Taking? Authorizing Provider   HYDROcodone-acetaminophen (NORCO) 5-325 MG per tablet Take 1 tablet by mouth every 6 hours as needed for Pain. Yes Historical Provider, MD   ketorolac (TORADOL) 10 MG tablet Take 1 tablet by mouth every 6 hours as needed for Pain 4/11/22 4/11/23  Thu Zheng PA-C   estradiol (ESTRACE) 1 MG tablet Take 1 tablet by mouth daily 4/11/22   Thu Zheng PA-C   Prenatal Vit-Fe Fumarate-FA (PRENATAL VITAMIN PO) Take by mouth    Historical Provider, MD       REVIEW OF SYSTEMS    (2-9 systems for level 4, 10 or more for level 5)      Review of Systems   Constitutional: Negative for fever. HENT: Negative for congestion and rhinorrhea. Respiratory: Negative for shortness of breath. Cardiovascular: Negative for chest pain. Gastrointestinal: Positive for abdominal pain, nausea and vomiting. Negative for blood in stool, constipation and diarrhea. Genitourinary: Negative for dysuria and hematuria. Musculoskeletal: Negative for gait problem. Skin: Positive for wound.    Allergic/Immunologic: Negative for environmental allergies and food allergies. Neurological: Negative for syncope, weakness and numbness. Hematological: Negative for adenopathy. Psychiatric/Behavioral: Negative for agitation and confusion. PHYSICAL EXAM   (up to 7 for level 4, 8 or more for level 5)      INITIAL VITALS:   BP (!) 138/97   Pulse 107   Temp 100.7 °F (38.2 °C) (Tympanic)   Resp 17   SpO2 98%     Physical Exam  Vitals and nursing note reviewed. Constitutional:       General: She is in acute distress. Appearance: Normal appearance. She is obese. She is not toxic-appearing or diaphoretic. HENT:      Head: Normocephalic. Right Ear: External ear normal.      Left Ear: External ear normal.      Nose: Nose normal.      Mouth/Throat:      Pharynx: Oropharynx is clear. Eyes:      Conjunctiva/sclera: Conjunctivae normal.   Cardiovascular:      Rate and Rhythm: Normal rate. Pulses: Normal pulses. Pulmonary:      Effort: Pulmonary effort is normal.   Abdominal:      General: There is distension. Palpations: Abdomen is soft. Tenderness: There is abdominal tenderness. There is guarding. Musculoskeletal:         General: Normal range of motion. Cervical back: Normal range of motion. Right lower leg: No edema. Left lower leg: No edema. Skin:     General: Skin is warm. Capillary Refill: Capillary refill takes less than 2 seconds. Neurological:      Mental Status: She is alert and oriented to person, place, and time.    Psychiatric:         Mood and Affect: Mood normal.         DIFFERENTIAL  DIAGNOSIS     PLAN (LABS / IMAGING / EKG):  Orders Placed This Encounter   Procedures    Culture, Blood 2    Culture, Blood 1    Culture, Urine    CT ABDOMEN PELVIS W IV CONTRAST Additional Contrast? Radiologist Recommendation    CBC with Auto Differential    Comprehensive Metabolic Panel    Lactate, Sepsis    Urinalysis    Protime-INR    APTT    Lipase    Diet NPO    TYPE AND SCREEN       MEDICATIONS ORDERED:  Orders Placed This Encounter   Medications    0.9 % sodium chloride bolus    acetaminophen (TYLENOL) tablet 1,000 mg    iopamidol (ISOVUE-370) 76 % injection 75 mL       DDX: intraabdominal infection, dehiscence, peritonitis, uti, other    DIAGNOSTIC RESULTS / EMERGENCY DEPARTMENT COURSE / MDM   LAB RESULTS:  Results for orders placed or performed during the hospital encounter of 04/17/22   CBC with Auto Differential   Result Value Ref Range    WBC 8.1 3.5 - 11.3 k/uL    RBC 3.91 (L) 3.95 - 5.11 m/uL    Hemoglobin 12.0 11.9 - 15.1 g/dL    Hematocrit 35.7 (L) 36.3 - 47.1 %    MCV 91.3 82.6 - 102.9 fL    MCH 30.7 25.2 - 33.5 pg    MCHC 33.6 28.4 - 34.8 g/dL    RDW 12.1 11.8 - 14.4 %    Platelets 150 817 - 953 k/uL    MPV 9.9 8.1 - 13.5 fL    NRBC Automated 0.0 0.0 per 100 WBC    Seg Neutrophils 76 (H) 36 - 65 %    Lymphocytes 13 (L) 24 - 43 %    Monocytes 10 3 - 12 %    Eosinophils % 1 1 - 4 %    Basophils 0 0 - 2 %    Immature Granulocytes 0 0 %    Segs Absolute 6.13 1.50 - 8.10 k/uL    Absolute Lymph # 1.08 (L) 1.10 - 3.70 k/uL    Absolute Mono # 0.78 0.10 - 1.20 k/uL    Absolute Eos # 0.09 0.00 - 0.44 k/uL    Basophils Absolute 0.03 0.00 - 0.20 k/uL    Absolute Immature Granulocyte 0.03 0.00 - 0.30 k/uL   Lactate, Sepsis   Result Value Ref Range    Lactic Acid, Sepsis 1.7 0.5 - 1.9 mmol/L   Protime-INR   Result Value Ref Range    Protime 13.7 11.5 - 14.2 sec    INR 1.1    APTT   Result Value Ref Range    PTT 29.6 26.8 - 34.8 sec       IMPRESSION: Alert oriented obese nontoxic acutely ill-appearing four 59-year-old female who is 6 days postop laparoscopic hysterectomy is with sudden onset of bleeding from her incision site severe abdominal pain associated with vomiting x2 nonbilious nonbloody. Having bowel movements and urinating without any hematuria, no melena no hematochezia. On examination she is uncomfortable nontoxic abdomen is diffusely tender with rebound and guarding. Plan of a broad work-up labs imaging analgesia patient pressing on narcotic medications as she is breast-feeding will obtain CT imaging of her abdomen and pelvis and consultation to OB/GYN. RADIOLOGY:  pending      EKG  none    All EKG's are interpreted by the Emergency Department Physician who either signs or Co-signs this chart in the absence of a cardiologist.    EMERGENCY DEPARTMENT COURSE:  ED Course as of 04/17/22 1902   Sun Apr 17, 2022   1811 Pt is breastfeeding and requesting no narcotic medications [BG]   [place] 23 transferred to dr. Viola Torres []      ED Course User Index  [BG] Sandra Mansfield DO           PROCEDURES:      CONSULTS:  None    CRITICAL CARE:        FINAL IMPRESSION      1. Generalized abdominal pain          DISPOSITION / PLAN     DISPOSITION  Care transferred to dr. Viola Torres pending results of imaging and ob/gyn consultation      PATIENT REFERRED TO:  No follow-up provider specified.     DISCHARGE MEDICATIONS:  New Prescriptions    No medications on file       Allyson Sheppard DO, MS, RD  Emergency Medicine        (Please note that portions of thisnote were completed with a voice recognition program.  Efforts were made to edit the dictations but occasionally words are mis-transcribed.)       Sandra Mansfield DO  Resident  04/17/22 1902

## 2022-04-18 LAB
ABSOLUTE EOS #: 0.16 K/UL (ref 0–0.44)
ABSOLUTE IMMATURE GRANULOCYTE: <0.03 K/UL (ref 0–0.3)
ABSOLUTE LYMPH #: 1.03 K/UL (ref 1.1–3.7)
ABSOLUTE MONO #: 0.74 K/UL (ref 0.1–1.2)
BASOPHILS # BLD: 1 % (ref 0–2)
BASOPHILS ABSOLUTE: 0.03 K/UL (ref 0–0.2)
EOSINOPHILS RELATIVE PERCENT: 3 % (ref 1–4)
HCT VFR BLD CALC: 35.6 % (ref 36.3–47.1)
HEMOGLOBIN: 11.6 G/DL (ref 11.9–15.1)
IMMATURE GRANULOCYTES: 0 %
LYMPHOCYTES # BLD: 18 % (ref 24–43)
MCH RBC QN AUTO: 30.2 PG (ref 25.2–33.5)
MCHC RBC AUTO-ENTMCNC: 32.6 G/DL (ref 28.4–34.8)
MCV RBC AUTO: 92.7 FL (ref 82.6–102.9)
MONOCYTES # BLD: 13 % (ref 3–12)
NRBC AUTOMATED: 0 PER 100 WBC
PDW BLD-RTO: 12.1 % (ref 11.8–14.4)
PLATELET # BLD: 276 K/UL (ref 138–453)
PMV BLD AUTO: 10.3 FL (ref 8.1–13.5)
RBC # BLD: 3.84 M/UL (ref 3.95–5.11)
SEG NEUTROPHILS: 65 % (ref 36–65)
SEGMENTED NEUTROPHILS ABSOLUTE COUNT: 3.83 K/UL (ref 1.5–8.1)
WBC # BLD: 5.8 K/UL (ref 3.5–11.3)

## 2022-04-18 PROCEDURE — 1200000000 HC SEMI PRIVATE

## 2022-04-18 PROCEDURE — 2580000003 HC RX 258: Performed by: OBSTETRICS & GYNECOLOGY

## 2022-04-18 PROCEDURE — 99222 1ST HOSP IP/OBS MODERATE 55: CPT | Performed by: OBSTETRICS & GYNECOLOGY

## 2022-04-18 PROCEDURE — 85025 COMPLETE CBC W/AUTO DIFF WBC: CPT

## 2022-04-18 PROCEDURE — 6360000002 HC RX W HCPCS: Performed by: OBSTETRICS & GYNECOLOGY

## 2022-04-18 PROCEDURE — 36415 COLL VENOUS BLD VENIPUNCTURE: CPT

## 2022-04-18 RX ORDER — ONDANSETRON 2 MG/ML
4 INJECTION INTRAMUSCULAR; INTRAVENOUS EVERY 6 HOURS PRN
Status: DISCONTINUED | OUTPATIENT
Start: 2022-04-18 | End: 2022-04-19 | Stop reason: HOSPADM

## 2022-04-18 RX ORDER — ONDANSETRON 2 MG/ML
5 INJECTION INTRAMUSCULAR; INTRAVENOUS EVERY 6 HOURS PRN
Status: DISCONTINUED | OUTPATIENT
Start: 2022-04-18 | End: 2022-04-18

## 2022-04-18 RX ORDER — HYDROMORPHONE HCL 110MG/55ML
1 PATIENT CONTROLLED ANALGESIA SYRINGE INTRAVENOUS EVERY 4 HOURS PRN
Status: DISCONTINUED | OUTPATIENT
Start: 2022-04-18 | End: 2022-04-19 | Stop reason: HOSPADM

## 2022-04-18 RX ADMIN — HYDROMORPHONE HYDROCHLORIDE 1 MG: 2 INJECTION, SOLUTION INTRAMUSCULAR; INTRAVENOUS; SUBCUTANEOUS at 08:42

## 2022-04-18 RX ADMIN — ONDANSETRON 4 MG: 2 INJECTION INTRAMUSCULAR; INTRAVENOUS at 16:15

## 2022-04-18 RX ADMIN — HYDROMORPHONE HYDROCHLORIDE 1 MG: 2 INJECTION, SOLUTION INTRAMUSCULAR; INTRAVENOUS; SUBCUTANEOUS at 13:37

## 2022-04-18 RX ADMIN — CEFAZOLIN SODIUM 2000 MG: 10 INJECTION, POWDER, FOR SOLUTION INTRAVENOUS at 16:48

## 2022-04-18 RX ADMIN — ONDANSETRON 4 MG: 2 INJECTION INTRAMUSCULAR; INTRAVENOUS at 08:41

## 2022-04-18 RX ADMIN — HYDROMORPHONE HYDROCHLORIDE 1 MG: 2 INJECTION, SOLUTION INTRAMUSCULAR; INTRAVENOUS; SUBCUTANEOUS at 19:20

## 2022-04-18 RX ADMIN — SODIUM CHLORIDE, SODIUM LACTATE, POTASSIUM CHLORIDE, CALCIUM CHLORIDE AND DEXTROSE MONOHYDRATE: 5; 600; 310; 30; 20 INJECTION, SOLUTION INTRAVENOUS at 12:07

## 2022-04-18 ASSESSMENT — PAIN SCALES - GENERAL
PAINLEVEL_OUTOF10: 8
PAINLEVEL_OUTOF10: 6
PAINLEVEL_OUTOF10: 8
PAINLEVEL_OUTOF10: 6
PAINLEVEL_OUTOF10: 0

## 2022-04-18 ASSESSMENT — ENCOUNTER SYMPTOMS
VOMITING: 1
NAUSEA: 1

## 2022-04-18 ASSESSMENT — PAIN DESCRIPTION - PAIN TYPE
TYPE: ACUTE PAIN
TYPE: ACUTE PAIN

## 2022-04-18 ASSESSMENT — PAIN DESCRIPTION - ORIENTATION
ORIENTATION: LOWER
ORIENTATION: LOWER

## 2022-04-18 ASSESSMENT — PAIN DESCRIPTION - LOCATION
LOCATION: ABDOMEN
LOCATION: ABDOMEN

## 2022-04-18 NOTE — PROGRESS NOTES
Patient resting in bed at this time. Intreperter used to do vitals and assessment. Patient states she is having pain 6/10 and is nauseous. Patient denies any chest pain or shortness of breath. Bowel sounds are active. Small amounts of bloody drainage noted to be coming from [employer] area steri strips along with bruising. Patient denies any vaginal drainage. Once assessment was done patient began vomiting. Will call for Zofran order and IV pain medications. Denies any other needs.

## 2022-04-18 NOTE — PROGRESS NOTES
Patient assessment complete, with assistance of  at this time. Currently sitting up in bed, alert and oriented X4, complaining of abdominal pain 8/10, some feelings of nausea but no emesis noted. Small new bloody drainage noted on abdominal pad. Dressing replaced at this time. Will continue to monitor.

## 2022-04-18 NOTE — PROGRESS NOTES
Patient called out due to bleeding. Patient has dark red blood coming from incision around umbilical area. Abdomen was cleaned and new dressing placed on until Dr Ray Neal rounds on patient.  Dr Ray Neal notified

## 2022-04-18 NOTE — PROGRESS NOTES
Patient admitted to the [facility] floor to room 302. Patient ambulated to bed. Patient is deaf in both ears and an  is needed. Patient had questions if she could still breast feed her baby while being here however Dr. Lesli Zee informed patient that as long as she was taking the Ambien at night she would not be allowed to breastfeed and patient said she would continue to take [place] and give her baby formula instead. Patient will be provided a breast pump if she needs to use one. Patient is alert and oriented x4 and is independent in the room. Patients lungs are clear. Patient has a surgical incision laparoscopy that has redness and bruising along with old drainage. Patient has no further needs at this time, call light within reach, will continue to monitor.

## 2022-04-18 NOTE — PROGRESS NOTES
Patient called out. Small amount of bleeding noted to be coming from steri strips above [employer]. Blood was cleaned up and a padded tegaderm was applied over steri strips.

## 2022-04-18 NOTE — PROGRESS NOTES
Dr Hong Curry at the bedside. Steri strips changed and dressing applied. Dr Hong Curry would like an update once patient has eaten.

## 2022-04-18 NOTE — PROGRESS NOTES
Dr Claudette Frederic called to update on patient condition. Patient is vomiting and having pain. New orders received.

## 2022-04-18 NOTE — H&P
HISTORY AND PHYSICAL             Date: 4/18/2022        Patient Name: Sravan Reeves     YOB: 1995      Age:  28 y.o.     Chief Complaint     Chief Complaint   Patient presents with    Wound Check     bleeding from hyster site onset 1pm today with increased pain, emesis x2 and diarrhea           History Obtained From   patient    History of Present Illness   Pt presented to ED last patrizia and was admitted by locums with pelvic pain and draining from supraumbilical incision; midline rectus sheath hematoma noted on ct; stable hgb    Past Medical History     Past Medical History:   Diagnosis Date    Deafness     Ganglion cyst of wrist 2010    left    Gestational hypertension without significant proteinuria, antepartum     Hereditary nephropathy (Alport's) as achild     had renal biopsy    Hypertension     Irritable bowel syndrome     Kidney disease     Microscopic hematuria     Obesity     PONV (postoperative nausea and vomiting)     Proteinuria     Sensorineural hearing loss     Thin basement membrane disease     Bx proven        Past Surgical History     Past Surgical History:   Procedure Laterality Date    COLONOSCOPY  2009    Dr. Alejandra Yang Left 8/18/2015    CYSTOSCOPY  11/14/2018    with stent placement    HYSTERECTOMY, VAGINAL N/A 4/11/2022    HYSTERECTOMY VAGINAL LAPAROSCOPIC ROBOTIC ASSISTED- BSO performed by Edgardo Escalona DO at [address] [place] [address]  12/29/98    LAPAROSCOPY      [place] CYSTO/URETERO W/LITHOTRIPSY &[facility]WE[facility] STENT INSRT Right 11/14/2018    CYSTOSCOPY URETEROSCOPY LASER, HLL performed by Sourav Gutierrez MD at [facility] &[facility]WE[facility] STENT INSRT Right 11/14/2018    CYSTOSCOPY STENT INSERTION performed by Sourav Gutierrez MD at [address] [place] [address] URETEROSCOPY  8-    WISDOM TOOTH EXTRACTION      WRIST SURGERY  2010    cyst removal        Medications Prior to Admission     Prior to Admission medications Medication Sig Start Date End Date Taking? Authorizing Provider   HYDROcodone-acetaminophen (NORCO) 5-325 MG per tablet Take 1 tablet by mouth every 6 hours as needed for Pain. Yes Historical Provider, MD   ketorolac (TORADOL) 10 MG tablet Take 1 tablet by mouth every 6 hours as needed for Pain 4/11/22 4/11/23  Areli Velazquez PA-C   estradiol (ESTRACE) 1 MG tablet Take 1 tablet by mouth daily 4/11/22   Areli Velazquez PA-C   Prenatal Vit-Fe Fumarate-FA (PRENATAL VITAMIN PO) Take by mouth    Historical Provider, MD        Allergies   Patient has no known allergies. Social History     Social History     Tobacco History     Smoking Status  Former Smoker Quit date  3/18/2014 Smoking Tobacco Type  Cigarettes    Smokeless Tobacco Use  Never Used    Tobacco Comment  several cigarettes daily          Alcohol History     Alcohol Use Status  No          Drug Use     Drug Use Status  No          Sexual Activity     Sexually Active  Not Currently Partners  Male                Family History     Family History   Problem Relation Age of Onset    Cancer Paternal Grandmother         bone    High Cholesterol Mother     Cancer Mother         breast    High Blood Pressure Mother     Migraines Mother     Alcohol Abuse Father     Anxiety Disorder Father     Depression Father     Hearing Loss Brother     Hearing Loss Brother        Review of Systems   Review of Systems   Constitutional: Negative for chills and fever. Gastrointestinal: Positive for nausea and vomiting. Umbilical incision draining dark blood   Genitourinary: Positive for pelvic pain. Negative for dysuria and vaginal discharge. Physical Exam   BP (!) 150/83   Pulse 78   Temp 97.4 °F (36.3 °C) (Temporal)   Resp 20   Ht 5' 6\" (1.676 m)   Wt 245 lb (111.1 kg)   SpO2 99%   BMI 39.54 kg/m²     Physical Exam  Constitutional:       Appearance: Normal appearance. HENT:      Head: Normocephalic and atraumatic.    Eyes: Extraocular Movements: Extraocular movements intact. Pupils: Pupils are equal, round, and reactive to light. Cardiovascular:      Rate and Rhythm: Normal rate. Pulmonary:      Effort: Pulmonary effort is normal.   Abdominal:      General: Abdomen is flat. There is no distension. Palpations: Abdomen is soft. Tenderness: There is abdominal tenderness (at umbilicus). Comments: Dark blood draining from supraumbilical incision; 6 cm ecchymosis around supraumbilical incision   Musculoskeletal:         General: Normal range of motion. Skin:     General: Skin is warm and dry. Neurological:      Mental Status: She is alert and oriented to person, place, and time. Psychiatric:         Mood and Affect: Mood normal.         Behavior: Behavior normal.         Thought Content: Thought content normal.         Judgment: Judgment normal.       Dressing changed and new steri strips applied.  No active bleeding - old dark brown/red oozing noted  Labs      Recent Results (from the past 24 hour(s))   CBC with Auto Differential    Collection Time: 04/17/22  6:36 PM   Result Value Ref Range    WBC 8.1 3.5 - 11.3 k/uL    RBC 3.91 (L) 3.95 - 5.11 m/uL    Hemoglobin 12.0 11.9 - 15.1 g/dL    Hematocrit 35.7 (L) 36.3 - 47.1 %    MCV 91.3 82.6 - 102.9 fL    MCH 30.7 25.2 - 33.5 pg    MCHC 33.6 28.4 - 34.8 g/dL    RDW 12.1 11.8 - 14.4 %    Platelets 575 496 - 651 k/uL    MPV 9.9 8.1 - 13.5 fL    NRBC Automated 0.0 0.0 per 100 WBC    Seg Neutrophils 76 (H) 36 - 65 %    Lymphocytes 13 (L) 24 - 43 %    Monocytes 10 3 - 12 %    Eosinophils % 1 1 - 4 %    Basophils 0 0 - 2 %    Immature Granulocytes 0 0 %    Segs Absolute 6.13 1.50 - 8.10 k/uL    Absolute Lymph # 1.08 (L) 1.10 - 3.70 k/uL    Absolute Mono # 0.78 0.10 - 1.20 k/uL    Absolute Eos # 0.09 0.00 - 0.44 k/uL    Basophils Absolute 0.03 0.00 - 0.20 k/uL    Absolute Immature Granulocyte 0.03 0.00 - 0.30 k/uL   TYPE AND SCREEN    Collection Time: 04/17/22  6:36 PM Result Value Ref Range    Expiration Date 04/20/2022,2359     Arm Band Number [id number]     ABO/Rh A NEGATIVE     Antibody Screen NEGATIVE    Comprehensive Metabolic Panel    Collection Time: 04/17/22  6:36 PM   Result Value Ref Range    Glucose 126 (H) 70 - 99 mg/dL    BUN 17 6 - 20 mg/dL    CREATININE 0.81 0.50 - 0.90 mg/dL    Bun/Cre Ratio 21 (H) 9 - 20    Calcium 9.4 8.6 - 10.4 mg/dL    Sodium 138 135 - 144 mmol/L    Potassium 3.9 3.7 - 5.3 mmol/L    Chloride 101 98 - 107 mmol/L    CO2 24 20 - 31 mmol/L    Anion Gap 13 9 - 17 mmol/L    Alkaline Phosphatase 99 35 - 104 U/L    ALT 22 5 - 33 U/L    AST 20 <32 U/L    Total Bilirubin 0.71 0.3 - 1.2 mg/dL    Total Protein 7.2 6.4 - 8.3 g/dL    Albumin 4.4 3.5 - 5.2 g/dL    Albumin/Globulin Ratio 1.6 1.0 - 2.5    GFR Non-African American >60 >60 mL/min    GFR African American >60 >60 mL/min    GFR Comment          GFR Staging         Culture, Blood 1    Collection Time: 04/17/22  6:36 PM    Specimen: Blood   Result Value Ref Range    Specimen Description . BLOOD     Special Requests 20 ML RAC     Culture NO GROWTH 12 HOURS    Lactate, Sepsis    Collection Time: 04/17/22  6:36 PM   Result Value Ref Range    Lactic Acid, Sepsis 1.7 0.5 - 1.9 mmol/L   Protime-INR    Collection Time: 04/17/22  6:36 PM   Result Value Ref Range    Protime 13.7 11.5 - 14.2 sec    INR 1.1    APTT    Collection Time: 04/17/22  6:36 PM   Result Value Ref Range    PTT 29.6 26.8 - 34.8 sec   Lipase    Collection Time: 04/17/22  6:36 PM   Result Value Ref Range    Lipase 21 13 - 60 U/L   Culture, Blood 2    Collection Time: 04/17/22  6:47 PM    Specimen: Blood   Result Value Ref Range    Specimen Description . BLOOD     Special Requests 20 ML LAC     Culture NO GROWTH 12 HOURS    Urinalysis    Collection Time: 04/17/22  7:45 PM   Result Value Ref Range    Color, UA Yellow Yellow    Turbidity UA Clear Clear    Glucose, Ur NEGATIVE NEGATIVE    Bilirubin Urine NEGATIVE NEGATIVE    Ketones, Urine NEGATIVE NEGATIVE    Specific Gravity, UA 1.020 1.010 - 1.020    Urine Hgb NEGATIVE NEGATIVE    pH, UA 6.5 5.0 - 9.0    Protein, UA NEGATIVE NEGATIVE    Urobilinogen, Urine Normal Normal    Nitrite, Urine NEGATIVE NEGATIVE    Leukocyte Esterase, Urine NEGATIVE NEGATIVE   CBC with Auto Differential    Collection Time: 04/18/22  8:07 AM   Result Value Ref Range    WBC 5.8 3.5 - 11.3 k/uL    RBC 3.84 (L) 3.95 - 5.11 m/uL    Hemoglobin 11.6 (L) 11.9 - 15.1 g/dL    Hematocrit 35.6 (L) 36.3 - 47.1 %    MCV 92.7 82.6 - 102.9 fL    MCH 30.2 25.2 - 33.5 pg    MCHC 32.6 28.4 - 34.8 g/dL    RDW 12.1 11.8 - 14.4 %    Platelets 709 202 - 756 k/uL    MPV 10.3 8.1 - 13.5 fL    NRBC Automated 0.0 0.0 per 100 WBC    Seg Neutrophils 65 36 - 65 %    Lymphocytes 18 (L) 24 - 43 %    Monocytes 13 (H) 3 - 12 %    Eosinophils % 3 1 - 4 %    Basophils 1 0 - 2 %    Immature Granulocytes 0 0 %    Segs Absolute 3.83 1.50 - 8.10 k/uL    Absolute Lymph # 1.03 (L) 1.10 - 3.70 k/uL    Absolute Mono # 0.74 0.10 - 1.20 k/uL    Absolute Eos # 0.16 0.00 - 0.44 k/uL    Basophils Absolute 0.03 0.00 - 0.20 k/uL    Absolute Immature Granulocyte <0.03 0.00 - 0.30 k/uL        Imaging/Diagnostics Last 24 Hours   CT ABDOMEN PELVIS W IV CONTRAST Additional Contrast? Radiologist Recommendation    Result Date: 4/17/2022  EXAMINATION: CT OF THE ABDOMEN AND PELVIS WITH CONTRAST 4/17/2022 7:00 pm TECHNIQUE: CT of the abdomen and pelvis was performed with the administration of intravenous contrast. Multiplanar reformatted images are provided for review. Dose modulation, iterative reconstruction, and/or weight based adjustment of the mA/kV was utilized to reduce the radiation dose to as low as reasonably achievable. COMPARISON: None.  HISTORY: ORDERING SYSTEM PROVIDED HISTORY: sp hysterectomy with bleeding from incision severe abdominal pain TECHNOLOGIST PROVIDED HISTORY: sp hysterectomy with bleeding from incision severe abdominal pain Decision Support Exception - unselect if not a suspected or confirmed emergency medical condition->Emergency Medical Condition (MA) FINDINGS: Lower Chest: Clear Organs: The visualized upper abdominal organs are unremarkable in appearance. Again noted is bilateral nephrolithiasis without evidence for obstructive uropathy. GI/Bowel: Nonobstructed bowel-gas pattern Pelvis: The bladder is unremarkable in appearance there is no evidence for free air or free fluid in the pelvis. Mild soft tissue stranding likely postoperative in nature related to recent hysterectomy Peritoneum/Retroperitoneum: Negative for aneurysm Bones/Soft Tissues: There is a 16 x 8 x 5 cm hematoma that involves the anterior abdominal wall in the midline with extension into the abdominal cavity. Active hemorrhage cannot be excluded as the study was not gated towards evaluation of active hemorrhage. Large hematoma in the anterior abdominal wall in the midline with extension into the peritoneal cavity and mesentery measuring 16 x 8 x 5 cm in size. Recommend correlation with serial hemoglobin and hematocrit if there is clinical concern for continued active bleeding. Alternatively, a CT angiogram could be obtained with immediately postcontrast and delayed postcontrast imaging to evaluate for active hemorrhage. Assessment      Hospital Problems           Last Modified POA    * (Principal) Abdominal pain 4/17/2022 Yes        Incisional hematoma-draining  Pelvic pain  Stable hemoglobin  Plan   1. Analgesics  2.  Antibiotic      Consultations Ordered:  None    Electronically signed by Asher Dale, DO on 4/18/22 at 4:25 PM EDT

## 2022-04-18 NOTE — ED NOTES
Called Dr Paula Rodríguez, on call OB, via cell and left message to call the JOSE Lorenz  04/17/22 2053

## 2022-04-18 NOTE — PROGRESS NOTES
Comprehensive Nutrition Assessment    Type and Reason for Visit:  Initial    Nutrition Recommendations/Plan:  1. Advance diet  2. Monitor weight and PO intake    Nutrition Assessment:  Inadequate protein intake r/t, acute injury/trauma AEB, wound. Currently NPO, and is losing weight 3.2% in 1 month. She is breastfeeding, but currently pumping/dumping due to Ambien. Malnutrition Assessment:  Malnutrition Status: At risk for malnutrition (Comment) (Pt is currently NPO, has slight nausea, and last meal on 04/11/2022.)    Context:  Acute Illness     Findings of the 6 clinical characteristics of malnutrition:  Energy Intake:  Mild decrease in energy intake (Comment) (Pt last meal was on 04/11/2022)  Weight Loss:  No significant weight loss     Body Fat Loss:  No significant body fat loss     Muscle Mass Loss:  No significant muscle mass loss    Fluid Accumulation:  No significant fluid accumulation     Strength:  Not Performed    Estimated Daily Nutrient Needs:  Energy (kcal):  0347-4341 (15-18); Weight Used for Energy Requirements:  Current     Protein (g):  77-89 (1.3-1.5); Weight Used for Protein Requirements:  Ideal        Fluid (ml/day):  2005; Method Used for Fluid Requirements:  1 ml/kcal      Nutrition Related Findings:  Pt last meal was on 04/11/2022. Appetite was good before admission, eating 3 meals/day with snacks. Pt said she mostly drinks water throughout the day with an occasional soda.       Wounds:  Surgical Incision       Current Nutrition Therapies:    Diet NPO    Anthropometric Measures:  · Height: 5' 6\" (167.6 cm)  · Current Body Weight: 245 lb (111.1 kg)   · Admission Body Weight: 245 lb (111.1 kg)    · Usual Body Weight: 253 lb (114.8 kg) (03/29/2022, Pt also stated.)     · Ideal Body Weight: 130 lbs; % Ideal Body Weight 188.5 %   · BMI: 39.6  · BMI Categories: Obese Class 2 (BMI 35.0 -39.9)       Nutrition Diagnosis:   · Inadequate protein intake related to acute injury/trauma as evidenced by wounds    ·   related to   as evidenced by    Lab Results   Component Value Date    LABA1C 4.9 11/09/2020     Recent Labs     04/17/22  1836      K 3.9      CO2 24   BUN 17   CREATININE 0.81   GLUCOSE 126*   ALT 22   ALKPHOS 99   GFR                 Lab Results   Component Value Date    LABALBU 4.4 04/17/2022        Nutrition Interventions:   Food and/or Nutrient Delivery:  Continue Current Diet  Nutrition Education/Counseling:  No recommendation at this time   Coordination of Nutrition Care:  Continue to monitor while inpatient    Goals:  Advance diet >75% meals consumed. wound healing. Nutrition Monitoring and Evaluation:   Behavioral-Environmental Outcomes:  None Identified   Food/Nutrient Intake Outcomes:  Diet Advancement/Tolerance,Food and Nutrient Intake  Physical Signs/Symptoms Outcomes:  Nausea or Vomiting,Weight,GI Status     Discharge Planning:     Too soon to determine     Electronically signed by Reema Coyne on 4/18/22 at 10:05 AM EDT    Contact: [phone]

## 2022-04-18 NOTE — PROGRESS NOTES
Patient called out due to wanting to eat and being upset that Dr Jyoti Palencia has yet to round. Patient was informed that she was in surgery today that writer can call to get a time that she will be up here today. Patient expresses understanding. Dr Cao [facility] office called and stated she is still in [address] will attempt again.

## 2022-04-18 NOTE — PROGRESS NOTES
Patient states her pain is a 5/10 at this time. Patient was given soup and jello along with crackers, shortly after patient threw up everything she ate. Sal Freed still unable to be given due to patients nausea.

## 2022-04-18 NOTE — PROGRESS NOTES
Met with Patient this a.m. to discuss discharge planning. Patient is a 28year old single, white female, admitted with a diagnosis of Abdominal pain s/p Hysterectomy. Patient also noted to be deaf.  device needed for assistance to communicate. Patient wishes to be discharged home following this hospitalization. No needs or concerns noted at this time pertaining to discharge. Patient resides at home in [facility]. Has an infant son at home. Good support of friends and family noted. Patient normally independent with all activities of daily living. She follows with Dr. Riky Doan as her PCP. Patient has Medicare and Medicaid insurance coverage and cites no difficulty with regards to affording her medications at this point. Discharge plan is home when stable. Patient is a 'Full Code' status and has no medical directives currently on file. No anticipated discharge needs noted by Patient. LSW to monitor and assist as needs or concerns arise.     Alec. Fercho [address]  4/18/2022

## 2022-04-18 NOTE — PROGRESS NOTES
Patient states she has slight nausea still after Zofran but pain is under control after Dilaudid. Denies any other needs. Patient needing to pump at this time. Notified her that she will need to dump milk due to Ambien and pain medications. Denies any other needs.

## 2022-04-18 NOTE — PROGRESS NOTES
Patient was given Norco 5-325mg tablet for her pain that she rated a 7/10 along with ambien 5mg tablet to help her sleep.

## 2022-04-19 VITALS
RESPIRATION RATE: 20 BRPM | HEART RATE: 73 BPM | TEMPERATURE: 97.1 F | HEIGHT: 66 IN | SYSTOLIC BLOOD PRESSURE: 136 MMHG | DIASTOLIC BLOOD PRESSURE: 84 MMHG | BODY MASS INDEX: 39.37 KG/M2 | OXYGEN SATURATION: 97 % | WEIGHT: 245 LBS

## 2022-04-19 LAB
ABSOLUTE EOS #: 0.19 K/UL (ref 0–0.44)
ABSOLUTE IMMATURE GRANULOCYTE: <0.03 K/UL (ref 0–0.3)
ABSOLUTE LYMPH #: 1.31 K/UL (ref 1.1–3.7)
ABSOLUTE MONO #: 0.69 K/UL (ref 0.1–1.2)
BASOPHILS # BLD: 1 % (ref 0–2)
BASOPHILS ABSOLUTE: 0.03 K/UL (ref 0–0.2)
CULTURE: NORMAL
EOSINOPHILS RELATIVE PERCENT: 3 % (ref 1–4)
HCT VFR BLD CALC: 36 % (ref 36.3–47.1)
HEMOGLOBIN: 11.9 G/DL (ref 11.9–15.1)
IMMATURE GRANULOCYTES: 0 %
LYMPHOCYTES # BLD: 22 % (ref 24–43)
MCH RBC QN AUTO: 30.4 PG (ref 25.2–33.5)
MCHC RBC AUTO-ENTMCNC: 33.1 G/DL (ref 28.4–34.8)
MCV RBC AUTO: 92.1 FL (ref 82.6–102.9)
MONOCYTES # BLD: 12 % (ref 3–12)
NRBC AUTOMATED: 0 PER 100 WBC
PDW BLD-RTO: 12.1 % (ref 11.8–14.4)
PLATELET # BLD: 303 K/UL (ref 138–453)
PMV BLD AUTO: 10.2 FL (ref 8.1–13.5)
RBC # BLD: 3.91 M/UL (ref 3.95–5.11)
SEG NEUTROPHILS: 62 % (ref 36–65)
SEGMENTED NEUTROPHILS ABSOLUTE COUNT: 3.6 K/UL (ref 1.5–8.1)
SPECIMEN DESCRIPTION: NORMAL
WBC # BLD: 5.8 K/UL (ref 3.5–11.3)

## 2022-04-19 PROCEDURE — 2580000003 HC RX 258: Performed by: OBSTETRICS & GYNECOLOGY

## 2022-04-19 PROCEDURE — 36415 COLL VENOUS BLD VENIPUNCTURE: CPT

## 2022-04-19 PROCEDURE — 85025 COMPLETE CBC W/AUTO DIFF WBC: CPT

## 2022-04-19 PROCEDURE — 6360000002 HC RX W HCPCS: Performed by: OBSTETRICS & GYNECOLOGY

## 2022-04-19 PROCEDURE — 6370000000 HC RX 637 (ALT 250 FOR IP): Performed by: OBSTETRICS & GYNECOLOGY

## 2022-04-19 RX ORDER — DOCUSATE SODIUM 100 MG/1
100 CAPSULE, LIQUID FILLED ORAL 2 TIMES DAILY
Status: DISCONTINUED | OUTPATIENT
Start: 2022-04-19 | End: 2022-04-19 | Stop reason: HOSPADM

## 2022-04-19 RX ORDER — DOCUSATE SODIUM 100 MG/1
100 CAPSULE, LIQUID FILLED ORAL 2 TIMES DAILY
Qty: 20 CAPSULE | Refills: 0 | Status: SHIPPED | OUTPATIENT
Start: 2022-04-19

## 2022-04-19 RX ORDER — CEPHALEXIN 500 MG/1
500 CAPSULE ORAL 2 TIMES DAILY
Qty: 14 CAPSULE | Refills: 0 | Status: SHIPPED | OUTPATIENT
Start: 2022-04-19 | End: 2022-04-26

## 2022-04-19 RX ORDER — ONDANSETRON 4 MG/1
4 TABLET, FILM COATED ORAL 3 TIMES DAILY PRN
Qty: 15 TABLET | Refills: 0 | Status: SHIPPED | OUTPATIENT
Start: 2022-04-19

## 2022-04-19 RX ADMIN — ONDANSETRON 4 MG: 2 INJECTION INTRAMUSCULAR; INTRAVENOUS at 12:13

## 2022-04-19 RX ADMIN — CEFAZOLIN SODIUM 2000 MG: 10 INJECTION, POWDER, FOR SOLUTION INTRAVENOUS at 08:21

## 2022-04-19 RX ADMIN — HYDROCODONE BITARTRATE AND ACETAMINOPHEN 1 TABLET: 5; 325 TABLET ORAL at 09:24

## 2022-04-19 RX ADMIN — CEFAZOLIN SODIUM 2000 MG: 10 INJECTION, POWDER, FOR SOLUTION INTRAVENOUS at 01:18

## 2022-04-19 RX ADMIN — DOCUSATE SODIUM 100 MG: 100 CAPSULE, LIQUID FILLED ORAL at 10:22

## 2022-04-19 RX ADMIN — SODIUM CHLORIDE, SODIUM LACTATE, POTASSIUM CHLORIDE, CALCIUM CHLORIDE AND DEXTROSE MONOHYDRATE: 5; 600; 310; 30; 20 INJECTION, SOLUTION INTRAVENOUS at 03:04

## 2022-04-19 ASSESSMENT — PAIN SCALES - GENERAL
PAINLEVEL_OUTOF10: 6
PAINLEVEL_OUTOF10: 0

## 2022-04-19 ASSESSMENT — PAIN DESCRIPTION - ORIENTATION: ORIENTATION: LOWER;MID

## 2022-04-19 ASSESSMENT — PAIN DESCRIPTION - PAIN TYPE: TYPE: ACUTE PAIN

## 2022-04-19 ASSESSMENT — PAIN DESCRIPTION - LOCATION: LOCATION: ABDOMEN

## 2022-04-19 ASSESSMENT — ENCOUNTER SYMPTOMS: NAUSEA: 1

## 2022-04-19 NOTE — PROGRESS NOTES
Patient called out reporting of drainage coming from surgical incision. Dressing has a small amount of drainage from wound, with loosened steri strip. Cleaned and reapplied by [facility]. Will continue to monitor at this time.

## 2022-04-19 NOTE — PROGRESS NOTES
Vitals and assessment done. Patient is A&Ox4 and on room air. Lung sounds are clear denies any shortness of breath. Heart rhythm is regular, denies any chest pain. Patient denies any nausea currently. Pain is at a 6/10, agrees to try oral pain once she eats. Small amount of drainage noted to dressing. Bowel sounds are active. Denies any other needs at this time.

## 2022-04-19 NOTE — PROGRESS NOTES
Dr Magdalena Moore called back. Patient is stating she feels constipated. No bowel movement since admission. New orders received. Patient also was able to tolerate a general diet with no nausea or vomiting, Dr Magdalena Moore notified.

## 2022-04-19 NOTE — PROGRESS NOTES
Called to Patient room by Jose Ramon Nava, as Patient upset due to infant son not wanting to eat as Patient cannot nurse him due to current medication regime. Patient requesting to try formula with infant son who is present with Patient boyfriend, at the hospital at this time.  device utilized for communication as both Patient and her boyfriend are deaf. Alberto Rod [id number])  Patient agreeable to try infant formula provided by [facility] department. Patient appreciative of this writer's visit. Medicare second notice presented at this time as well. LSW to remain involved and monitor/assist with further needs as they present.     [address]  4/19/2022

## 2022-04-19 NOTE — PROGRESS NOTES
Patient reassessment complete at this time. Denies any additional pain, or discomfort to the incision area. Dressing is clean, dry and intact with  Small drainage noted. Patients denies any nausea as of current and no additional reports of emesis. Bed in lowest position, call light placed within reach. Will continue to monitor.

## 2022-04-19 NOTE — PROGRESS NOTES
Patient and mother given discharge instructions using , all questions answered. Patient was bale to get dressed independently and gathered belongings. Denies any pain and nausea. Abd binder was placed on the patient. Wheeled to car driven by mother, no signs of distress.

## 2022-04-19 NOTE — DISCHARGE INSTR - DIET

## 2022-04-19 NOTE — PROGRESS NOTES
Carlene Nicholas is a 28 y.o. female patient. Current Facility-Administered Medications   Medication Dose Route Frequency Provider Last Rate Last Admin    docusate sodium (COLACE) capsule 100 mg  100 mg Oral BID Esperanza Edwards, DO   100 mg at 04/19/22 1022    HYDROmorphone (DILAUDID) injection 1 mg  1 mg IntraVENous Q4H PRN Tristan Faust MD   1 mg at 04/18/22 1920    ondansetron (ZOFRAN) injection 4 mg  4 mg IntraVENous Q6H PRN Tristan Faust MD   4 mg at 04/19/22 1213    zolpidem (AMBIEN) tablet 5 mg  5 mg Oral Nightly PRN Tristan Faust MD   5 mg at 04/17/22 2316    HYDROcodone-acetaminophen (NORCO) 5-325 MG per tablet 1 tablet  1 tablet Oral Q4H PRN Tristan Faust MD   1 tablet at 04/19/22 0924    dextrose 5 % in lactated ringers infusion   IntraVENous Continuous Tristan Faust MD 75 mL/hr at 04/19/22 0304 New Bag at 04/19/22 0304     No Known Allergies  Principal Problem:    Abdominal pain  Active Problems:    Abdominal wall hematoma  Resolved Problems:    * No resolved hospital problems. *    Blood pressure 136/84, pulse 73, temperature 97.1 °F (36.2 °C), temperature source Temporal, resp. rate 20, height 5' 6\" (1.676 m), weight 245 lb (111.1 kg), SpO2 97 %, currently breastfeeding. Subjective:  Symptoms:  Improved. Diet:  Adequate intake. She reports  nausea. Activity level: Normal.    Pain:  She reports pain is improving. Pain is requiring pain medication. Objective:  General Appearance:  Well-appearing. Vital signs: (most recent): Blood pressure 136/84, pulse 73, temperature 97.1 °F (36.2 °C), temperature source Temporal, resp. rate 20, height 5' 6\" (1.676 m), weight 245 lb (111.1 kg), SpO2 97 %, currently breastfeeding. Vital signs are normal.    Output: No stool output. HEENT: Normal HEENT exam.    Lungs:  Normal effort. Heart: Normal rate. Abdomen: Abdomen is soft.   (Minimal drainage from supraumbilical incision; ecchymosis approximately 5 cm beyond same). Bowel sounds are normal.   There is generalized tenderness. There is cheryl-umbilical tenderness. (Other incisions c/d/i). Neurological: Patient is alert and oriented to person, place and time. Skin:  Warm and dry.       Assessment & Plan     Incisional hematoma  Stable cbc - cultures all neg  Tolerating oral intake today  Will discharge and plan fu next [address], DO  4/19/2022

## 2022-04-21 ENCOUNTER — TELEPHONE (OUTPATIENT)
Dept: OBGYN | Age: 27
End: 2022-04-21

## 2022-04-21 NOTE — TELEPHONE ENCOUNTER
Pt called in with interpretor, pt would like to know if she should continue her medication it is making her vomit. While speaking with Lindsey Pt informed us she was having chest pains, informed pt to proceed to the ER due to chest pains. Pt said she would see what she could do.

## 2022-04-22 LAB
CULTURE: NORMAL
CULTURE: NORMAL
Lab: NORMAL
Lab: NORMAL
SPECIMEN DESCRIPTION: NORMAL
SPECIMEN DESCRIPTION: NORMAL

## 2022-04-22 RX ORDER — INCONTINENCE PAD,LINER,DISP
EACH MISCELLANEOUS
Qty: 1 EACH | Refills: 0 | Status: SHIPPED | OUTPATIENT
Start: 2022-04-22 | End: 2022-08-16

## 2022-04-22 ASSESSMENT — ENCOUNTER SYMPTOMS: VOMITING: 1

## 2022-04-26 ENCOUNTER — OFFICE VISIT (OUTPATIENT)
Dept: OBGYN | Age: 27
End: 2022-04-26

## 2022-04-26 VITALS
BODY MASS INDEX: 39.21 KG/M2 | SYSTOLIC BLOOD PRESSURE: 126 MMHG | WEIGHT: 244 LBS | HEIGHT: 66 IN | DIASTOLIC BLOOD PRESSURE: 72 MMHG

## 2022-04-26 DIAGNOSIS — Z09 POSTOPERATIVE EXAMINATION: Primary | ICD-10-CM

## 2022-04-26 PROCEDURE — 99024 POSTOP FOLLOW-UP VISIT: CPT | Performed by: OBSTETRICS & GYNECOLOGY

## 2022-04-26 NOTE — PROGRESS NOTES
Postop Progress Note    Subjective    Sravan Reeves presents to the office for postop follow up. Objective    Vitals:    04/26/22 1113   BP: 126/72     General: alert, cooperative and no distress  Incision: healing well - bruising resolving - no drainage    Assessment  Doing well postoperatively. Plan  1. Continue any current medications  2. Wound care discussed  3. Pt is to increase activities as tolerated  4. Usual diet  5.  Follow up: as needed    Electronically signed by Edgardo Escalona DO on 4/26/2022 at 11:33 AM

## 2022-05-04 ENCOUNTER — OFFICE VISIT (OUTPATIENT)
Dept: OBGYN | Age: 27
End: 2022-05-04

## 2022-05-04 VITALS
SYSTOLIC BLOOD PRESSURE: 126 MMHG | HEIGHT: 66 IN | BODY MASS INDEX: 38.41 KG/M2 | DIASTOLIC BLOOD PRESSURE: 74 MMHG | WEIGHT: 239 LBS

## 2022-05-04 DIAGNOSIS — Z09 POSTOPERATIVE EXAMINATION: Primary | ICD-10-CM

## 2022-05-04 PROCEDURE — 99024 POSTOP FOLLOW-UP VISIT: CPT

## 2022-05-04 NOTE — PROGRESS NOTES
Postop Progress Note    Subjective    Carlene Nicholas presents to the office for postop follow up. Chief Complaint   Patient presents with    Post-Op Check     Patient is being seen due to continued spotting after TLH. The bleeding has been right red and she is afraid that she has done damage after lifting her son. Objective    Vitals:    05/04/22 1059   BP: 126/74     General: alert, cooperative and no distress  Incision: healing well    Assessment  Doing well postoperatively. Has been having some bleeding but reports that it continues to be light. Denies soaking pad at any time. She is concerned with spotting and cramping since she lifted her son. States that cramping resolves when she rests for a short time. Reassured that cuff is healing well and that some spotting and discharge is to be expected for several weeks after surgery. Asked that she message us if she starts saturating a pad within 1 hr. Instructed patient to rest and adhere to restrictions to the best of her ability. Plan  1. Continue any current medications  2. Wound care discussed  3. Pt is to continue with restrictions   4. Usual diet  5.  Follow up: 3 weeks for final post-op    Electronically signed by Korey Hollis PA-C on 5/4/2022 at 11:33 AM

## 2022-05-09 VITALS
TEMPERATURE: 97 F | HEART RATE: 66 BPM | DIASTOLIC BLOOD PRESSURE: 88 MMHG | HEIGHT: 66 IN | RESPIRATION RATE: 18 BRPM | BODY MASS INDEX: 40.18 KG/M2 | SYSTOLIC BLOOD PRESSURE: 140 MMHG | OXYGEN SATURATION: 96 % | WEIGHT: 250 LBS

## 2022-05-23 ENCOUNTER — OFFICE VISIT (OUTPATIENT)
Dept: OBGYN | Age: 27
End: 2022-05-23

## 2022-05-23 VITALS — SYSTOLIC BLOOD PRESSURE: 120 MMHG | DIASTOLIC BLOOD PRESSURE: 82 MMHG | WEIGHT: 244 LBS | BODY MASS INDEX: 39.38 KG/M2

## 2022-05-23 DIAGNOSIS — Z09 POSTOPERATIVE EXAMINATION: Primary | ICD-10-CM

## 2022-05-23 PROCEDURE — 99024 POSTOP FOLLOW-UP VISIT: CPT | Performed by: OBSTETRICS & GYNECOLOGY

## 2022-05-23 RX ORDER — ESTRADIOL 1 MG/1
1 TABLET ORAL DAILY
Qty: 30 TABLET | Refills: 12 | Status: SHIPPED | OUTPATIENT
Start: 2022-05-23 | End: 2022-09-07 | Stop reason: SDUPTHER

## 2022-05-23 NOTE — PROGRESS NOTES
Postop Progress Note    Subjective    Phuc Colón presents to the office for postop follow up. Chief Complaint   Patient presents with    Post-Op Check     6 wk PO hysterectomy. C/O internal vaginal itching, denies discharge. Objective    Vitals:    05/23/22 1602   BP: 120/82     General: alert, cooperative and no distress  Incision: healing well, cuff well supported and intact    Assessment  Doing well postoperatively. Plan  1. Continue any current medications  2. Wound care discussed  3. Pt is to increase activities as tolerated  4. Usual diet  5.  Follow up: as needed    Electronically signed by Evangelina Miguel DO on 5/23/2022 at 4:08 PM

## 2022-08-16 ENCOUNTER — OFFICE VISIT (OUTPATIENT)
Dept: OBGYN | Age: 27
End: 2022-08-16
Payer: MEDICARE

## 2022-08-16 VITALS
WEIGHT: 237 LBS | BODY MASS INDEX: 38.09 KG/M2 | SYSTOLIC BLOOD PRESSURE: 120 MMHG | HEIGHT: 66 IN | DIASTOLIC BLOOD PRESSURE: 80 MMHG

## 2022-08-16 DIAGNOSIS — Z01.419 WELL WOMAN EXAM WITH ROUTINE GYNECOLOGICAL EXAM: Primary | ICD-10-CM

## 2022-08-16 PROCEDURE — G0101 CA SCREEN;PELVIC/BREAST EXAM: HCPCS | Performed by: ADVANCED PRACTICE MIDWIFE

## 2022-08-16 ASSESSMENT — PATIENT HEALTH QUESTIONNAIRE - PHQ9
SUM OF ALL RESPONSES TO PHQ9 QUESTIONS 1 & 2: 0
2. FEELING DOWN, DEPRESSED OR HOPELESS: 0
SUM OF ALL RESPONSES TO PHQ QUESTIONS 1-9: 0
1. LITTLE INTEREST OR PLEASURE IN DOING THINGS: 0

## 2022-08-16 NOTE — PATIENT INSTRUCTIONS
SURVEY:    You may be receiving a survey regarding your visit today. Please complete the survey to enable us to provide the highest quality of care to you and your family. We strive for all 5's - thank you    If you cannot score us a very good (5) on any question, please call the office to discuss this with Collin Reed (our ). We would like to discuss how we could of made your experience a very good one. Collin Reed: [phone]    Thank you.

## 2022-08-16 NOTE — PROGRESS NOTES
Nausea or Vomiting (Patient not taking: No sig reported), Disp: 15 tablet, Rfl: 0    Social History     Substance and Sexual Activity   Sexual Activity Yes    Partners: Male    Comment: hyst       Any bleeding or pain with intercourse: No    Last Yearly:  2021    Last pap: 01/05/21    Last HPV: 03/16/18 +    Last Mammogram: n/a    Last Dexascan n/a    Last colorectal screen- type:colonoscopy *  date  2016/    Do you do self breast exams: Yes    Past Medical History:   Diagnosis Date    Deafness     Ganglion cyst of wrist 2010    left    Gestational hypertension without significant proteinuria, antepartum     Hereditary nephropathy (Alport's) as achild     had renal biopsy    Hypertension     Irritable bowel syndrome     Kidney disease     Microscopic hematuria     Obesity     PONV (postoperative nausea and vomiting)     Proteinuria     Sensorineural hearing loss     Thin basement membrane disease     Bx proven       Past Surgical History:   Procedure Laterality Date    COLONOSCOPY  2009    Dr. Veronica Menjivar Left 8/18/2015    CYSTOSCOPY  11/14/2018    with stent placement    HYSTERECTOMY, VAGINAL N/A 4/11/2022    HYSTERECTOMY VAGINAL LAPAROSCOPIC ROBOTIC ASSISTED- BSO performed by Niki Doss DO at [address]  12/29/98    LAPAROSCOPY      GA CYSTO/URETERO W/LITHOTRIPSY &INDWELL STENT INSRT Right 11/14/2018    CYSTOSCOPY URETEROSCOPY LASER, HLL performed by Eze Cronin MD at [address] &INDWELL STENT INSRT Right 11/14/2018    CYSTOSCOPY STENT INSERTION performed by Eze Cronin MD at [address]    URETEROSCOPY  8-    WISDOM TOOTH EXTRACTION      WRIST SURGERY  2010    cyst removal       Family History   Problem Relation Age of Onset    Cancer Paternal Grandmother         bone    High Cholesterol Mother     Cancer Mother         breast    High Blood Pressure Mother     Migraines Mother     Alcohol Abuse Father     Anxiety Disorder Father Depression Father     Hearing Loss Brother     Hearing Loss Brother        Chief Complaint   Patient presents with    Gynecologic Exam     Yearly, last pap 01/05/21, last HPV 03/16/18 +, hyst 04/11/22 cervix removed. Pt states no issues or concerns           PE:  Vital Signs  Blood pressure 120/80, height 5' 6\" (1.676 m), weight 237 lb (107.5 kg), last menstrual period 09/28/2020, currently breastfeeding. Estimated body mass index is 38.25 kg/m² as calculated from the following:    Height as of this encounter: 5' 6\" (1.676 m). Weight as of this encounter: 237 lb (107.5 kg). Labs:    No results found for this visit on 08/16/22. PHQ-9 Total Score: 0 (8/16/2022  1:03 PM)      NURSE: NAZIA    HPI: Today for routine well woman exam.  Patient states she is not having any pain since her hysterectomy. Review of Systems   All other systems reviewed and are negative. Objective  Lymphatic:   no lymphadenopathy  Heent:   negative   Cor: regular rate and rhythm, no murmurs              Pul:clear to auscultation bilaterally- no wheezes, rales or rhonchi, normal air movement, no respiratory distress      GI: Abdomen soft, non-tender. BS normal. No masses,  No organomegaly           Extremities: normal strength, tone, and muscle mass   Breasts: Breast:normal appearance, no masses or tenderness   Pelvic Exam: deffered                              Assessment and Plan          Diagnosis Orders   1. Well woman exam with routine gynecological exam                  I am having Claire Fitch. Jona maintain her docusate sodium, ondansetron, and estradiol. Return in about 1 year (around 8/16/2023) for yearly. She was also counseled on her preventative health maintenance recommendations and follow-up. Patient Instructions   SURVEY:    You may be receiving a survey regarding your visit today. Please complete the survey to enable us to provide the highest quality of care to you and your family.     We strive for all 5's - thank you    If you cannot score us a very good (5) on any question, please call the office to discuss this with Alyx Arango (our ). We would like to discuss how we could of made your experience a very good one. Alyx Arango: [phone]    Thank you.      Claudean Shackle, APRN - JEFF,8/16/2022 1:21 PM

## 2022-08-29 RX ORDER — ESTRADIOL 1 MG/1
1 TABLET ORAL DAILY
Qty: 30 TABLET | Refills: 12 | OUTPATIENT
Start: 2022-08-29

## 2022-09-01 RX ORDER — ESTRADIOL 1 MG/1
1 TABLET ORAL DAILY
Qty: 30 TABLET | Refills: 12 | OUTPATIENT
Start: 2022-09-01

## 2022-09-07 RX ORDER — ESTRADIOL 1 MG/1
1 TABLET ORAL DAILY
Qty: 30 TABLET | Refills: 12 | Status: SHIPPED | OUTPATIENT
Start: 2022-09-07

## 2022-09-07 NOTE — TELEPHONE ENCOUNTER
Patient called and stated she didn't have any refills left on Estradiol medication. Dr. Michael Fernández had prescribed this after her surgery. She has been out for a week.

## 2023-03-28 PROBLEM — R51.9 ACUTE HEADACHE: Status: ACTIVE | Noted: 2023-03-28

## 2023-04-05 PROBLEM — I10 ESSENTIAL HYPERTENSION: Status: ACTIVE | Noted: 2023-04-05

## 2024-01-09 ENCOUNTER — OFFICE VISIT (OUTPATIENT)
Dept: OBGYN | Age: 29
End: 2024-01-09
Payer: MEDICARE

## 2024-01-09 VITALS
BODY MASS INDEX: 43.39 KG/M2 | DIASTOLIC BLOOD PRESSURE: 84 MMHG | SYSTOLIC BLOOD PRESSURE: 122 MMHG | WEIGHT: 270 LBS | HEIGHT: 66 IN

## 2024-01-09 DIAGNOSIS — Z01.419 WELL WOMAN EXAM WITH ROUTINE GYNECOLOGICAL EXAM: Primary | ICD-10-CM

## 2024-01-09 DIAGNOSIS — E89.40 SURGICAL MENOPAUSE: ICD-10-CM

## 2024-01-09 PROBLEM — R10.31 CONTINUOUS RLQ ABDOMINAL PAIN: Status: RESOLVED | Noted: 2021-01-18 | Resolved: 2024-01-09

## 2024-01-09 PROBLEM — Z34.90 TERM PREGNANCY: Status: RESOLVED | Noted: 2021-07-07 | Resolved: 2024-01-09

## 2024-01-09 PROBLEM — R10.9 ABDOMINAL PAIN: Status: RESOLVED | Noted: 2022-04-17 | Resolved: 2024-01-09

## 2024-01-09 PROCEDURE — G0101 CA SCREEN;PELVIC/BREAST EXAM: HCPCS | Performed by: ADVANCED PRACTICE MIDWIFE

## 2024-01-09 PROCEDURE — 3079F DIAST BP 80-89 MM HG: CPT | Performed by: ADVANCED PRACTICE MIDWIFE

## 2024-01-09 PROCEDURE — G8484 FLU IMMUNIZE NO ADMIN: HCPCS | Performed by: ADVANCED PRACTICE MIDWIFE

## 2024-01-09 PROCEDURE — 3074F SYST BP LT 130 MM HG: CPT | Performed by: ADVANCED PRACTICE MIDWIFE

## 2024-01-09 RX ORDER — ESTRADIOL 1 MG/1
1 TABLET ORAL DAILY
Qty: 30 TABLET | Refills: 12 | Status: SHIPPED | OUTPATIENT
Start: 2024-01-09

## 2024-01-09 ASSESSMENT — PATIENT HEALTH QUESTIONNAIRE - PHQ9
1. LITTLE INTEREST OR PLEASURE IN DOING THINGS: 0
SUM OF ALL RESPONSES TO PHQ QUESTIONS 1-9: 1
SUM OF ALL RESPONSES TO PHQ9 QUESTIONS 1 & 2: 1
2. FEELING DOWN, DEPRESSED OR HOPELESS: 1

## 2024-01-09 NOTE — PATIENT INSTRUCTIONS
SURVEY:    You may be receiving a survey regarding your visit today.    Please complete the survey to enable us to provide the highest quality of care to you and your family.    We strive for all 5's - thank you    If you cannot score us a very good (5) on any question, please call the office to discuss this with Jasmyn (our ). We would like to discuss how we could of made your experience a very good one.    Jasmyn: [phone]    Thank you.

## 2024-01-09 NOTE — PROGRESS NOTES
YEARLY PHYSICAL    Date of service: 2024    Pretty Tenorio  Is a 28 y.o.  co-habitating, female    PT's PCP is: Ruy Lopes MD     : 1995                                             Subjective:       Patient's last menstrual period was 2020.     Are your menses regular: not applicable    OB History    Para Term  AB Living   2 2 2     2   SAB IAB Ectopic Molar Multiple Live Births           0 2      # Outcome Date GA Lbr Poli/2nd Weight Sex Delivery Anes PTL Lv   2 Term 21 39w0d 00:38 / 00:22 3.652 kg (8 lb 0.8 oz) M Vag-Spont EPI N NAYELI   1 Term 14 40w0d  3.345 kg (7 lb 6 oz) F Vag-Spont  N NAYELI        Social History     Tobacco Use   Smoking Status Every Day    Current packs/day: 0.00    Types: Cigarettes    Last attempt to quit: 3/18/2014    Years since quittin.8   Smokeless Tobacco Never   Tobacco Comments    several cigarettes daily        Social History     Substance and Sexual Activity   Alcohol Use No    Alcohol/week: 0.0 standard drinks of alcohol       Family History   Problem Relation Age of Onset    Cancer Paternal Grandmother         bone    High Cholesterol Mother     Cancer Mother         breast    High Blood Pressure Mother     Migraines Mother     Alcohol Abuse Father     Anxiety Disorder Father     Depression Father     Hearing Loss Brother     Hearing Loss Brother        Any family history of breast or ovarian cancer: Yes, mother has breast cancer     Any family history of blood clots: Yes, grandparents     Have you had a positive covid test: No    Have you had the covid immunization: Yes      Allergies: Patient has no known allergies.      Current Outpatient Medications:     estradiol (ESTRACE) 1 MG tablet, Take 1 tablet by mouth daily, Disp: 30 tablet, Rfl: 12    carvedilol (COREG) 12.5 MG tablet, Take 1 tablet by mouth 2 times daily (Patient not taking: Reported on 2024),

## 2024-01-25 ENCOUNTER — HOSPITAL ENCOUNTER (OUTPATIENT)
Dept: NUTRITION | Age: 29
Discharge: HOME | End: 2024-01-25
Attending: INTERNAL MEDICINE
Payer: MEDICARE

## 2024-01-25 VITALS — BODY MASS INDEX: 41.99 KG/M2 | WEIGHT: 261.25 LBS | HEIGHT: 66 IN

## 2024-01-25 PROCEDURE — 97802 MEDICAL NUTRITION INDIV IN: CPT

## 2024-01-25 NOTE — PROGRESS NOTES
Pretty is deaf and came with an  Pamela    MNT provided for Obesity class III and essential HTN    Food and nutrition-related knowledge deficit related to Lack of previous MNT/currently undergoing MNT as evidenced by Conditions associated with a diagnosis or treatment: Obesity class III. Pretty is interested in losing weight.     Client data    Ht: 5'6\" Wt: 261# 4 oz BMI: 42.17 (obesity class III)   Weight changes: loss of 11# 4 oz x 1 week since starting Adipex CBW: 118.5 kg   BMR: 1931 calories Est. total calorie needs: ~9194-9571       Client overall goal for weight is for weight loss trend towards a healthier BMI.  Pretty was started on Adipex ~ 1 week ago and has stopped snacking and has noticed a decrease in appetite. She was very excited to see her weight and that she had lost weight since seeing Dr Lopes. Current eating pattern is consistent in meal timing, inconsistent in carbohydrates.  Use of whole grains, whole fruits and vegetables appear less than recommended quantities.  There was an excess of calories, carbohydrates, fats, and sodium (large meals, toño chocolate bars, cheese sauce with chips, cookies, cheesecake, beef jerkey, pizza, rice, pasta, potato chips, lunch meat sandwiches, etc. States she has not had any of these foods since she started adipex. She stopped drinking pop 6 months ago, only drinks water per recall.  Activity is fair, does walking and shake weights for 20 minutes.  Client presents for MNT today with Pamela the  and Pt son.    Pretty has noticed she started gaining weight after her hysterectomy. She has not been eating after 7 pm at night and before 8 am in the morning and feels this has helped her.     Client was educated on carbohydrate counting with the following goals at meals and snacks:  Breakfast: 45 grams  Lunch: 45 grams  Supper: 45 grams  Bedtime Snack: 15 grams    Client received information on limiting fat in diet to lessen heart disease risk,

## 2024-04-10 ENCOUNTER — HOSPITAL ENCOUNTER (OUTPATIENT)
Age: 29
Discharge: HOME | End: 2024-04-10
Payer: MEDICARE

## 2024-04-10 DIAGNOSIS — R31.0 GROSS HEMATURIA: ICD-10-CM

## 2024-04-10 DIAGNOSIS — R30.0 DYSURIA: ICD-10-CM

## 2024-04-10 LAB
BACTERIA URNS QL MICRO: ABNORMAL
BILIRUB UR QL STRIP: NEGATIVE
CLARITY UR: ABNORMAL
COLOR UR: YELLOW
EPI CELLS #/AREA URNS HPF: ABNORMAL /HPF (ref 0–25)
GLUCOSE UR STRIP-MCNC: NEGATIVE MG/DL
HGB UR QL STRIP.AUTO: ABNORMAL
KETONES UR STRIP-MCNC: NEGATIVE MG/DL
LEUKOCYTE ESTERASE UR QL STRIP: NEGATIVE
MUCOUS THREADS URNS QL MICRO: ABNORMAL
NITRITE UR QL STRIP: NEGATIVE
PH UR STRIP: 6 [PH] (ref 5–9)
PROT UR STRIP-MCNC: ABNORMAL MG/DL
RBC #/AREA URNS HPF: ABNORMAL /HPF (ref 0–2)
SP GR UR STRIP: >1.03 (ref 1.01–1.02)
UROBILINOGEN UR STRIP-ACNC: NORMAL EU/DL (ref 0–1)
WBC #/AREA URNS HPF: ABNORMAL /HPF (ref 0–5)

## 2024-04-10 PROCEDURE — 81001 URINALYSIS AUTO W/SCOPE: CPT

## 2024-04-21 ENCOUNTER — APPOINTMENT (OUTPATIENT)
Dept: CT IMAGING | Age: 29
End: 2024-04-21
Payer: MEDICARE

## 2024-04-21 ENCOUNTER — HOSPITAL ENCOUNTER (EMERGENCY)
Age: 29
Discharge: HOME | End: 2024-04-21
Attending: EMERGENCY MEDICINE
Payer: MEDICARE

## 2024-04-21 VITALS
TEMPERATURE: 97.7 F | SYSTOLIC BLOOD PRESSURE: 127 MMHG | WEIGHT: 225 LBS | HEART RATE: 81 BPM | BODY MASS INDEX: 36.16 KG/M2 | HEIGHT: 66 IN | RESPIRATION RATE: 16 BRPM | DIASTOLIC BLOOD PRESSURE: 80 MMHG | OXYGEN SATURATION: 98 %

## 2024-04-21 DIAGNOSIS — N20.0 KIDNEY STONE ON RIGHT SIDE: Primary | ICD-10-CM

## 2024-04-21 DIAGNOSIS — R10.13 ABDOMINAL PAIN, EPIGASTRIC: ICD-10-CM

## 2024-04-21 LAB
ALBUMIN SERPL-MCNC: 4.2 G/DL (ref 3.5–5.2)
ALBUMIN/GLOB SERPL: 1.4 {RATIO} (ref 1–2.5)
ALP SERPL-CCNC: 93 U/L (ref 35–104)
ALT SERPL-CCNC: 21 U/L (ref 5–33)
ANION GAP SERPL CALCULATED.3IONS-SCNC: 10 MMOL/L (ref 9–17)
AST SERPL-CCNC: 17 U/L
BACTERIA URNS QL MICRO: ABNORMAL
BASOPHILS # BLD: 0.05 K/UL (ref 0–0.2)
BASOPHILS NFR BLD: 1 % (ref 0–2)
BILIRUB DIRECT SERPL-MCNC: <0.1 MG/DL
BILIRUB INDIRECT SERPL-MCNC: ABNORMAL MG/DL (ref 0–1)
BILIRUB SERPL-MCNC: 0.2 MG/DL (ref 0.3–1.2)
BILIRUB UR QL STRIP: ABNORMAL
BUN SERPL-MCNC: 20 MG/DL (ref 6–20)
BUN/CREAT SERPL: 25 (ref 9–20)
CALCIUM SERPL-MCNC: 9.6 MG/DL (ref 8.6–10.4)
CHLORIDE SERPL-SCNC: 104 MMOL/L (ref 98–107)
CLARITY UR: ABNORMAL
CO2 SERPL-SCNC: 28 MMOL/L (ref 20–31)
COLOR UR: YELLOW
CREAT SERPL-MCNC: 0.8 MG/DL (ref 0.5–0.9)
CRYSTALS URNS MICRO: ABNORMAL /HPF
EOSINOPHIL # BLD: 0.66 K/UL (ref 0–0.44)
EOSINOPHILS RELATIVE PERCENT: 7 % (ref 1–4)
EPI CELLS #/AREA URNS HPF: ABNORMAL /HPF (ref 0–25)
ERYTHROCYTE [DISTWIDTH] IN BLOOD BY AUTOMATED COUNT: 12 % (ref 11.8–14.4)
GFR SERPL CREATININE-BSD FRML MDRD: >90 ML/MIN/1.73M2
GLUCOSE SERPL-MCNC: 84 MG/DL (ref 70–99)
GLUCOSE UR STRIP-MCNC: NEGATIVE MG/DL
HCT VFR BLD AUTO: 45.2 % (ref 36.3–47.1)
HGB BLD-MCNC: 15.3 G/DL (ref 11.9–15.1)
HGB UR QL STRIP.AUTO: ABNORMAL
IMM GRANULOCYTES # BLD AUTO: <0.03 K/UL (ref 0–0.3)
IMM GRANULOCYTES NFR BLD: 0 %
KETONES UR STRIP-MCNC: ABNORMAL MG/DL
LEUKOCYTE ESTERASE UR QL STRIP: ABNORMAL
LIPASE SERPL-CCNC: 39 U/L (ref 13–60)
LYMPHOCYTES NFR BLD: 1.4 K/UL (ref 1.1–3.7)
LYMPHOCYTES RELATIVE PERCENT: 16 % (ref 24–43)
MCH RBC QN AUTO: 31.5 PG (ref 25.2–33.5)
MCHC RBC AUTO-ENTMCNC: 33.8 G/DL (ref 28.4–34.8)
MCV RBC AUTO: 93 FL (ref 82.6–102.9)
MONOCYTES NFR BLD: 0.53 K/UL (ref 0.1–1.2)
MONOCYTES NFR BLD: 6 % (ref 3–12)
MUCOUS THREADS URNS QL MICRO: ABNORMAL
NEUTROPHILS NFR BLD: 70 % (ref 36–65)
NEUTS SEG NFR BLD: 6.26 K/UL (ref 1.5–8.1)
NITRITE UR QL STRIP: NEGATIVE
NRBC BLD-RTO: 0 PER 100 WBC
PH UR STRIP: 6 [PH] (ref 5–9)
PLATELET # BLD AUTO: 306 K/UL (ref 138–453)
PMV BLD AUTO: 9.6 FL (ref 8.1–13.5)
POTASSIUM SERPL-SCNC: 3.8 MMOL/L (ref 3.7–5.3)
PROT SERPL-MCNC: 7.1 G/DL (ref 6.4–8.3)
PROT UR STRIP-MCNC: ABNORMAL MG/DL
RBC # BLD AUTO: 4.86 M/UL (ref 3.95–5.11)
RBC #/AREA URNS HPF: ABNORMAL /HPF (ref 0–2)
SODIUM SERPL-SCNC: 142 MMOL/L (ref 135–144)
SP GR UR STRIP: >1.03 (ref 1.01–1.02)
UROBILINOGEN UR STRIP-ACNC: NORMAL EU/DL (ref 0–1)
WBC #/AREA URNS HPF: ABNORMAL /HPF (ref 0–5)
WBC OTHER # BLD: 8.9 K/UL (ref 3.5–11.3)

## 2024-04-21 PROCEDURE — 6360000002 HC RX W HCPCS: Performed by: EMERGENCY MEDICINE

## 2024-04-21 PROCEDURE — 83690 ASSAY OF LIPASE: CPT

## 2024-04-21 PROCEDURE — 85025 COMPLETE CBC W/AUTO DIFF WBC: CPT

## 2024-04-21 PROCEDURE — 80048 BASIC METABOLIC PNL TOTAL CA: CPT

## 2024-04-21 PROCEDURE — 6360000004 HC RX CONTRAST MEDICATION: Performed by: EMERGENCY MEDICINE

## 2024-04-21 PROCEDURE — 99285 EMERGENCY DEPT VISIT HI MDM: CPT

## 2024-04-21 PROCEDURE — 81001 URINALYSIS AUTO W/SCOPE: CPT

## 2024-04-21 PROCEDURE — 74177 CT ABD & PELVIS W/CONTRAST: CPT

## 2024-04-21 PROCEDURE — 80076 HEPATIC FUNCTION PANEL: CPT

## 2024-04-21 PROCEDURE — 2580000003 HC RX 258: Performed by: EMERGENCY MEDICINE

## 2024-04-21 PROCEDURE — 96375 TX/PRO/DX INJ NEW DRUG ADDON: CPT

## 2024-04-21 PROCEDURE — 6370000000 HC RX 637 (ALT 250 FOR IP): Performed by: EMERGENCY MEDICINE

## 2024-04-21 PROCEDURE — 96374 THER/PROPH/DIAG INJ IV PUSH: CPT

## 2024-04-21 RX ORDER — ONDANSETRON 2 MG/ML
4 INJECTION INTRAMUSCULAR; INTRAVENOUS ONCE
Status: COMPLETED | OUTPATIENT
Start: 2024-04-21 | End: 2024-04-21

## 2024-04-21 RX ORDER — FENTANYL CITRATE 50 UG/ML
25 INJECTION, SOLUTION INTRAMUSCULAR; INTRAVENOUS ONCE
Status: COMPLETED | OUTPATIENT
Start: 2024-04-21 | End: 2024-04-21

## 2024-04-21 RX ORDER — KETOROLAC TROMETHAMINE 30 MG/ML
30 INJECTION, SOLUTION INTRAMUSCULAR; INTRAVENOUS ONCE
Status: COMPLETED | OUTPATIENT
Start: 2024-04-21 | End: 2024-04-21

## 2024-04-21 RX ORDER — 0.9 % SODIUM CHLORIDE 0.9 %
1000 INTRAVENOUS SOLUTION INTRAVENOUS ONCE
Status: COMPLETED | OUTPATIENT
Start: 2024-04-21 | End: 2024-04-21

## 2024-04-21 RX ADMIN — ALUMINUM HYDROXIDE, MAGNESIUM HYDROXIDE, AND SIMETHICONE: 1200; 120; 1200 SUSPENSION ORAL at 18:43

## 2024-04-21 RX ADMIN — SODIUM CHLORIDE 1000 ML: 9 INJECTION, SOLUTION INTRAVENOUS at 16:48

## 2024-04-21 RX ADMIN — IOPAMIDOL 75 ML: 755 INJECTION, SOLUTION INTRAVENOUS at 17:11

## 2024-04-21 RX ADMIN — FENTANYL CITRATE 25 MCG: 50 INJECTION INTRAMUSCULAR; INTRAVENOUS at 16:46

## 2024-04-21 RX ADMIN — ONDANSETRON 4 MG: 2 INJECTION INTRAMUSCULAR; INTRAVENOUS at 16:45

## 2024-04-21 RX ADMIN — KETOROLAC TROMETHAMINE 30 MG: 30 INJECTION, SOLUTION INTRAMUSCULAR at 18:43

## 2024-04-21 ASSESSMENT — PAIN SCALES - GENERAL
PAINLEVEL_OUTOF10: 10
PAINLEVEL_OUTOF10: 0
PAINLEVEL_OUTOF10: 8
PAINLEVEL_OUTOF10: 10
PAINLEVEL_OUTOF10: 4

## 2024-04-21 ASSESSMENT — PAIN DESCRIPTION - DESCRIPTORS
DESCRIPTORS: SHARP;STABBING;CRAMPING
DESCRIPTORS: ACHING
DESCRIPTORS: CRAMPING;ACHING
DESCRIPTORS: CRAMPING;BURNING

## 2024-04-21 ASSESSMENT — PAIN DESCRIPTION - LOCATION
LOCATION: ABDOMEN

## 2024-04-21 ASSESSMENT — LIFESTYLE VARIABLES
HOW OFTEN DO YOU HAVE A DRINK CONTAINING ALCOHOL: NEVER
HOW MANY STANDARD DRINKS CONTAINING ALCOHOL DO YOU HAVE ON A TYPICAL DAY: PATIENT DOES NOT DRINK

## 2024-04-21 ASSESSMENT — PAIN DESCRIPTION - ORIENTATION
ORIENTATION: UPPER
ORIENTATION: UPPER
ORIENTATION: MID;UPPER

## 2024-04-21 ASSESSMENT — PAIN - FUNCTIONAL ASSESSMENT: PAIN_FUNCTIONAL_ASSESSMENT: 0-10

## 2024-04-21 NOTE — DISCHARGE INSTRUCTIONS
Drink plenty of fluids.  Continue to take pain medications as needed.  Alternate between Tylenol and ibuprofen every 6-8 hours as needed for pain.  Return if you have any worsening symptoms otherwise have a close follow-up with your primary care doctor in the next 2 to 3 days.

## 2024-04-21 NOTE — ED PROVIDER NOTES
Care assumed from Dr. Boothe pending re-evaluation of pain. If pain improved, plan was for dc home. Nursing advises patient now has no pain. Will d/c per plan written by Shyann.     Edilberto Limon MD  04/21/24 1948    
chart in the absence of a cardiologist.        RADIOLOGY:   Non-plain film images such as CT, Ultrasound and MRI are read by the radiologist. Plain radiographic images are visualized and preliminarily interpreted by the emergency physician with the below findings:        Interpretation per the Radiologist below, if available at the time of this note:    CT ABDOMEN PELVIS W IV CONTRAST Additional Contrast? None   Preliminary Result   1. In the distal right ureter, there are adjacent 2 mm stones located 10 cm   from the ureterovesicular junction. No hydroureteronephrosis or perinephric   edema.   2. Punctate nonobstructing bilateral renal stones measure up to 4 mm.               ED BEDSIDE ULTRASOUND:   Performed by ED Physician - none    LABS:  Labs Reviewed   CBC WITH AUTO DIFFERENTIAL - Abnormal; Notable for the following components:       Result Value    Hemoglobin 15.3 (*)     Neutrophils % 70 (*)     Lymphocytes % 16 (*)     Eosinophils % 7 (*)     Eosinophils Absolute 0.66 (*)     All other components within normal limits   BASIC METABOLIC PANEL - Abnormal; Notable for the following components:    Bun/Cre Ratio 25 (*)     All other components within normal limits   HEPATIC FUNCTION PANEL - Abnormal; Notable for the following components:    Total Bilirubin 0.2 (*)     All other components within normal limits   URINALYSIS WITH REFLEX TO CULTURE - Abnormal; Notable for the following components:    Turbidity UA SLIGHTLY CLOUDY (*)     Bilirubin Urine SMALL (*)     Ketones, Urine TRACE (*)     Specific Gravity, UA >1.030 (*)     Urine Hgb 1+ (*)     Protein, UA 1+ (*)     Leukocyte Esterase, Urine TRACE (*)     All other components within normal limits   MICROSCOPIC URINALYSIS - Abnormal; Notable for the following components:    Crystals, UA 20 TO 50 CALCIUM OXALATE (*)     Bacteria, UA TRACE (*)     Mucus, UA 2+ (*)     All other components within normal limits   LIPASE       All other labs were within normal

## 2024-05-02 ENCOUNTER — HOSPITAL ENCOUNTER (OUTPATIENT)
Age: 29
Discharge: HOME | End: 2024-05-02
Payer: MEDICARE

## 2024-05-02 ENCOUNTER — OFFICE VISIT (OUTPATIENT)
Dept: UROLOGY | Age: 29
End: 2024-05-02

## 2024-05-02 ENCOUNTER — HOSPITAL ENCOUNTER (OUTPATIENT)
Age: 29
Setting detail: SPECIMEN
Discharge: HOME | End: 2024-05-02

## 2024-05-02 ENCOUNTER — TELEPHONE (OUTPATIENT)
Dept: UROLOGY | Age: 29
End: 2024-05-02

## 2024-05-02 VITALS
HEART RATE: 81 BPM | BODY MASS INDEX: 35.99 KG/M2 | DIASTOLIC BLOOD PRESSURE: 91 MMHG | WEIGHT: 223 LBS | SYSTOLIC BLOOD PRESSURE: 133 MMHG | TEMPERATURE: 97.7 F

## 2024-05-02 DIAGNOSIS — N13.2 URETERAL STONE WITH HYDRONEPHROSIS: Primary | ICD-10-CM

## 2024-05-02 DIAGNOSIS — N13.2 URETERAL STONE WITH HYDRONEPHROSIS: ICD-10-CM

## 2024-05-02 DIAGNOSIS — R11.0 NAUSEA: ICD-10-CM

## 2024-05-02 DIAGNOSIS — R31.0 GROSS HEMATURIA: ICD-10-CM

## 2024-05-02 DIAGNOSIS — Z01.818 PRE-OP TESTING: ICD-10-CM

## 2024-05-02 DIAGNOSIS — R30.9 PAIN WITH URINATION: ICD-10-CM

## 2024-05-02 DIAGNOSIS — N20.0 RENAL CALCULI: ICD-10-CM

## 2024-05-02 LAB
BACTERIA URNS QL MICRO: ABNORMAL
BILIRUB UR QL STRIP: NEGATIVE
CLARITY UR: CLEAR
COLOR UR: YELLOW
EPI CELLS #/AREA URNS HPF: ABNORMAL /HPF (ref 0–25)
GLUCOSE UR STRIP-MCNC: NEGATIVE MG/DL
HGB UR QL STRIP.AUTO: ABNORMAL
KETONES UR STRIP-MCNC: NEGATIVE MG/DL
LEUKOCYTE ESTERASE UR QL STRIP: NEGATIVE
NITRITE UR QL STRIP: NEGATIVE
PH UR STRIP: 6 [PH] (ref 5–9)
PROT UR STRIP-MCNC: NEGATIVE MG/DL
RBC #/AREA URNS HPF: ABNORMAL /HPF (ref 0–2)
SP GR UR STRIP: 1.02 (ref 1.01–1.02)
UROBILINOGEN UR STRIP-ACNC: NORMAL EU/DL (ref 0–1)
WBC #/AREA URNS HPF: ABNORMAL /HPF (ref 0–5)
YEAST URNS QL MICRO: ABNORMAL

## 2024-05-02 PROCEDURE — 81001 URINALYSIS AUTO W/SCOPE: CPT

## 2024-05-02 PROCEDURE — 87086 URINE CULTURE/COLONY COUNT: CPT

## 2024-05-02 RX ORDER — SULFAMETHOXAZOLE AND TRIMETHOPRIM 800; 160 MG/1; MG/1
1 TABLET ORAL 2 TIMES DAILY
Qty: 14 TABLET | Refills: 0 | Status: SHIPPED | OUTPATIENT
Start: 2024-05-02 | End: 2024-05-09

## 2024-05-02 RX ORDER — TAMSULOSIN HYDROCHLORIDE 0.4 MG/1
0.4 CAPSULE ORAL NIGHTLY
Qty: 30 CAPSULE | Refills: 0 | Status: SHIPPED | OUTPATIENT
Start: 2024-05-02

## 2024-05-02 RX ORDER — ONDANSETRON 4 MG/1
4 TABLET, FILM COATED ORAL 3 TIMES DAILY PRN
Qty: 30 TABLET | Refills: 0 | Status: SHIPPED | OUTPATIENT
Start: 2024-05-02

## 2024-05-02 RX ORDER — KETOROLAC TROMETHAMINE 10 MG/1
10 TABLET, FILM COATED ORAL EVERY 6 HOURS PRN
Qty: 20 TABLET | Refills: 0 | Status: SHIPPED | OUTPATIENT
Start: 2024-05-02 | End: 2025-05-02

## 2024-05-02 ASSESSMENT — ENCOUNTER SYMPTOMS
COUGH: 0
WHEEZING: 0
ABDOMINAL PAIN: 0
CONSTIPATION: 0
COLOR CHANGE: 0
NAUSEA: 0
SHORTNESS OF BREATH: 0
APNEA: 0
VOMITING: 0
EYE REDNESS: 0
BACK PAIN: 0

## 2024-05-02 NOTE — TELEPHONE ENCOUNTER
Left a message for the patient to call the office as soon as possible to schedule an appointment.      Phone call to Daly,  services, [phone].  Daly states the patient does prefer in-person .  Daly states if the patient calls back soon she could try to reach an .  However, the  may not be available to arrive today.

## 2024-05-02 NOTE — TELEPHONE ENCOUNTER
Received a phone call from Dr. Morley's office with new referral for this patient.  The patient has been to the ER  for kidney stones 04/21/2024.  The patient is having a KUB today(now).  Please advise when you would like new patient appointment.

## 2024-05-02 NOTE — PROGRESS NOTES
used via IPAD with Mariola #[id number].  The patient session started 12:12 pm and session ended 12:49 pm.      Phone call to Abiola in [facility] lab to request the urine specimen that was dropped off today be sent for culture.  Abiola verbalizes understanding and agreement.    
04/21/2024       ASSESSMENT:   Diagnosis Orders   1. Ureteral stone with hydronephrosis  Culture, Urine    EKG 12 Lead      2. Nausea  ondansetron (ZOFRAN) 4 MG tablet    Culture, Urine      3. Gross hematuria  Culture, Urine      4. Pain with urination  Culture, Urine      5. Pre-op testing  EKG 12 Lead              PLAN:  Urine culture    You may experience waves of pain and/or nausea.  You may also experience burning with urination, frequency, urgency, bladder spasms, and blood in the urine.     1) take toradol every 6 hours WITH FOOD     2) take Flomax daily    3) drink at least 80 oz fluid (water, juice, Gatorade - NOT tea, coffee, soda pop) daily    4) take bactrim with food as directed    For pain NOT controlled by toradol use OTC acetaminophen as directed as needed.     For nausea use zofran as directed as needed.     Call our office [phone] or go to ER (if after normal office hours) if you develop fever, intractable vomiting, severe/intolerable pain.       Discussed risks and benefits of HLL and stent placement (including anesthesia, bleeding, infection, injury to  tract, need for multiple procedures, and the risk of major complications such as ureteral perforation). We discussed the details of the procedure. We did discuss what to expect post-operatively to include: hematuria for up to two weeks, stent pain, and pain after stent removal. Patient amenable to schedule RIGHT HLL.      Pre-op EKG due to Coreg

## 2024-05-02 NOTE — PATIENT INSTRUCTIONS
You may experience waves of pain and/or nausea.  You may also experience burning with urination, frequency, urgency, bladder spasms, and blood in the urine.     1) take toradol every 6 hours WITH FOOD     2) take Flomax daily    3) drink at least 80 oz fluid (water, juice, Gatorade - NOT tea, coffee, soda pop) daily    4) take bactrim with food as directed    For pain NOT controlled by toradol use OTC acetaminophen as directed as needed.     For nausea use zofran as directed as needed.     Call our office [phone] or go to ER (if after normal office hours) if you develop fever, intractable vomiting, severe/intolerable pain.         Survey:    You may be receiving a survey from [employer] regarding your visit today.    Please complete the survey to enable us to provide the highest quality of care to you and your family.    If you cannot score us as very good on any question, please call the office to discuss how we could have major experience exceptional.    Thank you    Your Urology Care Team.

## 2024-05-02 NOTE — TELEPHONE ENCOUNTER
Patient will need an  SIGN LANGUAGE.    Pretty Tenorio     1995        female    [address] [address]                    Legal Guardian NO  If yes, Name:       Skilled Facility NO     If yes, Name:                                             Home Phone: [phone]         Cell Phone:    Telephone Information:   Mobile [phone]                                           Surgeon: Dr Harry Surgery Date: 05/07                    Time: NA    Procedure: Right Ureteroscopy Holmium Laser Lithotripsy with possible Right Ureteral Stent Placement/Exchange  Duration:    Diagnosis: Ureteral Stones; Renal Stones   CPT Codes: 07060    Important Medical History:  In Epic    First Assistant NA  Special Inst/Equip/Implants: Regular    Nickel allergy  NO  Latex Allergy: NO      Cardiac Device:  No  If yes, need most recent pacemaker interrogation from Cardiologist:  Type of pacemaker:    Anesthesia:    General                       Admission Type:  Same Day                        Admit Prior to Day of Surgery: No    Case Location:  Ambulatory            Preadmission Testing:  Phone Call             PAT Date and Time: NA    Need Preop Cardiac Clearance: NA  Need Pre-op/Medical Clearance:NA    Does Patient have Cardiologist/physician? Name of Physician:    HERNAN    Special Needs Communication:  Radha Silveira NO    needed YES

## 2024-05-03 LAB
EKG ATRIAL RATE: 61 BPM
EKG P AXIS: 28 DEGREES
EKG P-R INTERVAL: 140 MS
EKG Q-T INTERVAL: 400 MS
EKG QRS DURATION: 78 MS
EKG QTC CALCULATION (BAZETT): 402 MS
EKG R AXIS: 38 DEGREES
EKG T AXIS: 14 DEGREES
EKG VENTRICULAR RATE: 61 BPM
MICROORGANISM SPEC CULT: NORMAL
SPECIMEN DESCRIPTION: NORMAL

## 2024-05-04 ENCOUNTER — HOSPITAL ENCOUNTER (EMERGENCY)
Age: 29
Discharge: HOME | End: 2024-05-04
Attending: EMERGENCY MEDICINE
Payer: MEDICARE

## 2024-05-04 ENCOUNTER — APPOINTMENT (OUTPATIENT)
Dept: CT IMAGING | Age: 29
End: 2024-05-04
Payer: MEDICARE

## 2024-05-04 VITALS
WEIGHT: 228 LBS | RESPIRATION RATE: 20 BRPM | SYSTOLIC BLOOD PRESSURE: 120 MMHG | BODY MASS INDEX: 36.64 KG/M2 | HEIGHT: 66 IN | HEART RATE: 110 BPM | OXYGEN SATURATION: 96 % | TEMPERATURE: 100.1 F | DIASTOLIC BLOOD PRESSURE: 71 MMHG

## 2024-05-04 DIAGNOSIS — R50.9 FEVER, UNSPECIFIED FEVER CAUSE: ICD-10-CM

## 2024-05-04 DIAGNOSIS — G44.89 OTHER HEADACHE SYNDROME: ICD-10-CM

## 2024-05-04 DIAGNOSIS — R11.2 NAUSEA AND VOMITING, UNSPECIFIED VOMITING TYPE: Primary | ICD-10-CM

## 2024-05-04 LAB
ALBUMIN SERPL-MCNC: 4.2 G/DL (ref 3.5–5.2)
ALBUMIN/GLOB SERPL: 1.3 {RATIO} (ref 1–2.5)
ALP SERPL-CCNC: 87 U/L (ref 35–104)
ALT SERPL-CCNC: 27 U/L (ref 5–33)
ANION GAP SERPL CALCULATED.3IONS-SCNC: 12 MMOL/L (ref 9–17)
AST SERPL-CCNC: 23 U/L
BACTERIA URNS QL MICRO: ABNORMAL
BASOPHILS # BLD: 0 K/UL (ref 0–0.2)
BASOPHILS NFR BLD: 0 % (ref 0–2)
BILIRUB SERPL-MCNC: 1 MG/DL (ref 0.3–1.2)
BILIRUB UR QL STRIP: ABNORMAL
BUN SERPL-MCNC: 20 MG/DL (ref 6–20)
BUN/CREAT SERPL: 18 (ref 9–20)
CALCIUM SERPL-MCNC: 9.1 MG/DL (ref 8.6–10.4)
CHLORIDE SERPL-SCNC: 100 MMOL/L (ref 98–107)
CLARITY UR: CLEAR
CO2 SERPL-SCNC: 24 MMOL/L (ref 20–31)
COLOR UR: YELLOW
CREAT SERPL-MCNC: 1.1 MG/DL (ref 0.5–0.9)
EOSINOPHIL # BLD: 0.64 K/UL (ref 0–0.44)
EOSINOPHILS RELATIVE PERCENT: 5 % (ref 1–4)
EPI CELLS #/AREA URNS HPF: ABNORMAL /HPF (ref 0–25)
ERYTHROCYTE [DISTWIDTH] IN BLOOD BY AUTOMATED COUNT: 12 % (ref 11.8–14.4)
FLUAV AG SPEC QL: NEGATIVE
FLUBV AG SPEC QL: NEGATIVE
GFR, ESTIMATED: 69 ML/MIN/1.73M2
GLUCOSE SERPL-MCNC: 99 MG/DL (ref 70–99)
GLUCOSE UR STRIP-MCNC: NEGATIVE MG/DL
HCT VFR BLD AUTO: 45.9 % (ref 36.3–47.1)
HGB BLD-MCNC: 15.7 G/DL (ref 11.9–15.1)
HGB UR QL STRIP.AUTO: NEGATIVE
IMM GRANULOCYTES # BLD AUTO: 0 K/UL (ref 0–0.3)
IMM GRANULOCYTES NFR BLD: 0 %
KETONES UR STRIP-MCNC: ABNORMAL MG/DL
LACTATE BLDV-SCNC: 1.7 MMOL/L (ref 0.5–2.2)
LEUKOCYTE ESTERASE UR QL STRIP: ABNORMAL
LYMPHOCYTES NFR BLD: 0.13 K/UL (ref 1.1–3.7)
LYMPHOCYTES RELATIVE PERCENT: 1 % (ref 24–43)
MCH RBC QN AUTO: 31.7 PG (ref 25.2–33.5)
MCHC RBC AUTO-ENTMCNC: 34.2 G/DL (ref 28.4–34.8)
MCV RBC AUTO: 92.5 FL (ref 82.6–102.9)
MONOCYTES NFR BLD: 0 % (ref 3–12)
MONOCYTES NFR BLD: 0 K/UL (ref 0.1–1.2)
MORPHOLOGY: NORMAL
MUCOUS THREADS URNS QL MICRO: ABNORMAL
NEUTROPHILS NFR BLD: 94 % (ref 36–65)
NEUTS SEG NFR BLD: 12.03 K/UL (ref 1.5–8.1)
NITRITE UR QL STRIP: NEGATIVE
NRBC BLD-RTO: 0 PER 100 WBC
PH UR STRIP: 6.5 [PH] (ref 5–9)
PLATELET # BLD AUTO: 278 K/UL (ref 138–453)
PMV BLD AUTO: 10.3 FL (ref 8.1–13.5)
POTASSIUM SERPL-SCNC: 3.8 MMOL/L (ref 3.7–5.3)
PROT SERPL-MCNC: 7.4 G/DL (ref 6.4–8.3)
PROT UR STRIP-MCNC: ABNORMAL MG/DL
RBC # BLD AUTO: 4.96 M/UL (ref 3.95–5.11)
RBC #/AREA URNS HPF: ABNORMAL /HPF (ref 0–2)
SARS-COV-2 RDRP RESP QL NAA+PROBE: NOT DETECTED
SODIUM SERPL-SCNC: 136 MMOL/L (ref 135–144)
SP GR UR STRIP: >1.03 (ref 1.01–1.02)
SPECIMEN DESCRIPTION: NORMAL
SPECIMEN SOURCE: NORMAL
STREP A, MOLECULAR: NEGATIVE
UROBILINOGEN UR STRIP-ACNC: NORMAL EU/DL (ref 0–1)
WBC #/AREA URNS HPF: ABNORMAL /HPF (ref 0–5)
WBC OTHER # BLD: 12.8 K/UL (ref 3.5–11.3)

## 2024-05-04 PROCEDURE — 70450 CT HEAD/BRAIN W/O DYE: CPT

## 2024-05-04 PROCEDURE — 6360000002 HC RX W HCPCS: Performed by: EMERGENCY MEDICINE

## 2024-05-04 PROCEDURE — 96374 THER/PROPH/DIAG INJ IV PUSH: CPT

## 2024-05-04 PROCEDURE — 87040 BLOOD CULTURE FOR BACTERIA: CPT

## 2024-05-04 PROCEDURE — 99284 EMERGENCY DEPT VISIT MOD MDM: CPT

## 2024-05-04 PROCEDURE — 96375 TX/PRO/DX INJ NEW DRUG ADDON: CPT

## 2024-05-04 PROCEDURE — 36415 COLL VENOUS BLD VENIPUNCTURE: CPT

## 2024-05-04 PROCEDURE — 87651 STREP A DNA AMP PROBE: CPT

## 2024-05-04 PROCEDURE — 96361 HYDRATE IV INFUSION ADD-ON: CPT

## 2024-05-04 PROCEDURE — 87804 INFLUENZA ASSAY W/OPTIC: CPT

## 2024-05-04 PROCEDURE — 83605 ASSAY OF LACTIC ACID: CPT

## 2024-05-04 PROCEDURE — 80053 COMPREHEN METABOLIC PANEL: CPT

## 2024-05-04 PROCEDURE — 6370000000 HC RX 637 (ALT 250 FOR IP)

## 2024-05-04 PROCEDURE — 85025 COMPLETE CBC W/AUTO DIFF WBC: CPT

## 2024-05-04 PROCEDURE — 81001 URINALYSIS AUTO W/SCOPE: CPT

## 2024-05-04 PROCEDURE — 87635 SARS-COV-2 COVID-19 AMP PRB: CPT

## 2024-05-04 PROCEDURE — 2580000003 HC RX 258: Performed by: EMERGENCY MEDICINE

## 2024-05-04 PROCEDURE — 87086 URINE CULTURE/COLONY COUNT: CPT

## 2024-05-04 RX ORDER — ONDANSETRON 4 MG/1
4 TABLET, ORALLY DISINTEGRATING ORAL 3 TIMES DAILY PRN
Qty: 21 TABLET | Refills: 0 | Status: SHIPPED | OUTPATIENT
Start: 2024-05-04

## 2024-05-04 RX ORDER — ACETAMINOPHEN 500 MG
TABLET ORAL
Status: COMPLETED
Start: 2024-05-04 | End: 2024-05-04

## 2024-05-04 RX ORDER — ACETAMINOPHEN 500 MG
1000 TABLET ORAL ONCE
Status: COMPLETED | OUTPATIENT
Start: 2024-05-04 | End: 2024-05-04

## 2024-05-04 RX ORDER — DIPHENHYDRAMINE HYDROCHLORIDE 50 MG/ML
50 INJECTION INTRAMUSCULAR; INTRAVENOUS ONCE
Status: COMPLETED | OUTPATIENT
Start: 2024-05-04 | End: 2024-05-04

## 2024-05-04 RX ORDER — KETOROLAC TROMETHAMINE 30 MG/ML
30 INJECTION, SOLUTION INTRAMUSCULAR; INTRAVENOUS ONCE
Status: COMPLETED | OUTPATIENT
Start: 2024-05-04 | End: 2024-05-04

## 2024-05-04 RX ORDER — PROCHLORPERAZINE EDISYLATE 5 MG/ML
10 INJECTION INTRAMUSCULAR; INTRAVENOUS ONCE
Status: COMPLETED | OUTPATIENT
Start: 2024-05-04 | End: 2024-05-04

## 2024-05-04 RX ORDER — 0.9 % SODIUM CHLORIDE 0.9 %
1000 INTRAVENOUS SOLUTION INTRAVENOUS ONCE
Status: COMPLETED | OUTPATIENT
Start: 2024-05-04 | End: 2024-05-04

## 2024-05-04 RX ADMIN — DIPHENHYDRAMINE HYDROCHLORIDE 50 MG: 50 INJECTION, SOLUTION INTRAMUSCULAR; INTRAVENOUS at 12:00

## 2024-05-04 RX ADMIN — SODIUM CHLORIDE 1000 ML: 9 INJECTION, SOLUTION INTRAVENOUS at 12:02

## 2024-05-04 RX ADMIN — KETOROLAC TROMETHAMINE 30 MG: 30 INJECTION, SOLUTION INTRAMUSCULAR at 15:07

## 2024-05-04 RX ADMIN — ACETAMINOPHEN 1000 MG: 500 TABLET ORAL at 12:23

## 2024-05-04 RX ADMIN — PROCHLORPERAZINE EDISYLATE 10 MG: 5 INJECTION INTRAMUSCULAR; INTRAVENOUS at 12:02

## 2024-05-04 RX ADMIN — Medication 1000 MG: at 12:23

## 2024-05-04 ASSESSMENT — PAIN DESCRIPTION - LOCATION
LOCATION: GENERALIZED
LOCATION: HEAD
LOCATION: GENERALIZED;HEAD

## 2024-05-04 ASSESSMENT — PAIN SCALES - GENERAL
PAINLEVEL_OUTOF10: 5
PAINLEVEL_OUTOF10: 5

## 2024-05-04 ASSESSMENT — PAIN DESCRIPTION - FREQUENCY: FREQUENCY: CONTINUOUS

## 2024-05-04 ASSESSMENT — PAIN DESCRIPTION - DESCRIPTORS
DESCRIPTORS: ACHING;THROBBING
DESCRIPTORS: THROBBING;ACHING

## 2024-05-04 ASSESSMENT — PAIN DESCRIPTION - ONSET
ONSET: ON-GOING
ONSET: ON-GOING

## 2024-05-04 ASSESSMENT — PAIN DESCRIPTION - PAIN TYPE
TYPE: ACUTE PAIN
TYPE: ACUTE PAIN

## 2024-05-04 ASSESSMENT — PAIN - FUNCTIONAL ASSESSMENT
PAIN_FUNCTIONAL_ASSESSMENT: 0-10
PAIN_FUNCTIONAL_ASSESSMENT: 0-10

## 2024-05-04 NOTE — ED NOTES
C/O generalized aching and headache #5/10 currently.  C/O nausea & chills.  Patient did have a small emesis appears like phlegm and saliva.

## 2024-05-04 NOTE — DISCHARGE INSTRUCTIONS
At this time you have refused to undergo a spinal tap/lumbar puncture risks and benefits were discussed in detail

## 2024-05-04 NOTE — ED NOTES
States feeling a little better, headache a little worse though.  Dr. Fay informed of temperature still 100.1 F and headache.

## 2024-05-04 NOTE — PROGRESS NOTES
Patient ambulated to bathroom without assistance and this nurse was able to obtain urine sample for ordered labs.

## 2024-05-04 NOTE — ED PROVIDER NOTES
[facility] ED  EMERGENCY DEPARTMENT ENCOUNTER      Pt Name: Pretty Tenorio  MRN: [id number]  Birthdate 1995  Date of evaluation: 5/4/2024  Provider: Guillermo Fay MD  Time Note started  11:38 AM EDT  5/4/24           NURSING NOTES REVIEWED     Pt evaluated in a timely manner      CURRENT MEDICATIONS       Discharge Medication List as of 5/4/2024  2:56 PM        CONTINUE these medications which have NOT CHANGED    Details   tamsulosin (FLOMAX) 0.4 MG capsule Take 1 capsule by mouth at bedtime, Disp-30 capsule, R-0Normal      ondansetron (ZOFRAN) 4 MG tablet Take 1 tablet by mouth 3 times daily as needed for Nausea or Vomiting, Disp-30 tablet, R-0Normal      ketorolac (TORADOL) 10 MG tablet Take 1 tablet by mouth every 6 hours as needed for Pain, Disp-20 tablet, R-0Normal      sulfamethoxazole-trimethoprim (BACTRIM DS;SEPTRA DS) 800-160 MG per tablet Take 1 tablet by mouth 2 times daily for 7 days, Disp-14 tablet, R-0Normal      carvedilol (COREG) 12.5 MG tablet Take 1 tablet by mouth 2 times daily, Disp-60 tablet, R-4Normal      estradiol (ESTRACE) 1 MG tablet Take 1 tablet by mouth daily, Disp-30 tablet, R-12Normal             ALLERGIES     Patient has no known allergies.    FAMILY HISTORY       Family History   Problem Relation Age of Onset    Cancer Paternal Grandmother         bone    High Cholesterol Mother     Cancer Mother         breast    High Blood Pressure Mother     Migraines Mother     Alcohol Abuse Father     Anxiety Disorder Father     Depression Father     Hearing Loss Brother     Hearing Loss Brother           SOCIAL HISTORY       Social History     Socioeconomic History    Marital status: Single     Spouse name: None    Number of children: None    Years of education: None    Highest education level: None   Tobacco Use    Smoking status: Every Day     Current packs/day: 0.00     Types: Cigarettes     Last attempt to quit: 3/18/2014     Years since quitting: 10.1    Smokeless tobacco:  History:   Diagnosis Date    Abnormal Pap smear of cervix     Deafness     Ganglion cyst of wrist 2010    left    Gestational hypertension without significant proteinuria, antepartum     Hereditary nephropathy (Alport's) as achild     had renal biopsy    Hypertension     Irritable bowel syndrome     Kidney disease     Kidney stones     Microscopic hematuria     Obesity     PONV (postoperative nausea and vomiting)     Proteinuria     Sensorineural hearing loss     Thin basement membrane disease     Bx proven     Past Surgical History:   Procedure Laterality Date    COLONOSCOPY  2009    Dr. Torres    CYSTOSCOPY Left 8/18/2015    CYSTOSCOPY  11/14/2018    with stent placement    HYSTERECTOMY, VAGINAL N/A 4/11/2022    HYSTERECTOMY VAGINAL LAPAROSCOPIC ROBOTIC ASSISTED- BSO performed by Esperanza Edwards DO at [facility] OR    KIDNEY BIOPSY  12/29/98    LAPAROSCOPY      ME CYSTO/URETERO W/LITHOTRIPSY &INDWELL STENT INSRT Right 11/14/2018    CYSTOSCOPY URETEROSCOPY LASER, HLL performed by Naman Harry MD at [facility] OR    ME CYSTO/URETERO W/LITHOTRIPSY &INDWELL STENT INSRT Right 11/14/2018    CYSTOSCOPY STENT INSERTION performed by Naman Harry MD at [facility] OR    URETEROSCOPY  8-    WISDOM TOOTH EXTRACTION      WRIST SURGERY  2010    cyst removal            Focused physical examination     Vitals:    05/04/24 1415 05/04/24 1431 05/04/24 1445 05/04/24 1500   BP: 113/63 111/70 113/64 120/71   Pulse: (!) 107 (!) 108 (!) 102 (!) 110   Resp:       Temp:       TempSrc:       SpO2: 95% 96% 97% 96%   Weight:       Height:         Physical Exam  Vitals and nursing note reviewed. Exam conducted with a chaperone present.   Constitutional:       General: She is not in acute distress.     Appearance: Normal appearance. She is not ill-appearing, toxic-appearing or diaphoretic.      Comments: Nontoxic   HENT:      Head: Normocephalic and atraumatic.      Right Ear: Tympanic membrane and external ear normal.      Left Ear:

## 2024-05-05 LAB
MICROORGANISM SPEC CULT: NORMAL
SERVICE CMNT-IMP: NORMAL
SERVICE CMNT-IMP: NORMAL
SPECIMEN DESCRIPTION: NORMAL

## 2024-05-06 ENCOUNTER — TELEPHONE (OUTPATIENT)
Dept: PREADMISSION TESTING | Age: 29
End: 2024-05-06

## 2024-05-06 ENCOUNTER — ANESTHESIA EVENT (OUTPATIENT)
Dept: OPERATING ROOM | Age: 29
End: 2024-05-06
Payer: MEDICARE

## 2024-05-06 NOTE — TELEPHONE ENCOUNTER
Please attempt to set up in person  services for patient. Procedure is tomorrow 5/7/24 with Dr. Harry. Thank you.

## 2024-05-06 NOTE — TELEPHONE ENCOUNTER
Pt with history of urinary problems, followed by Dr AGUILAR office. HTN, on coreg. Pt deaf and requesting in person . No issues seen in history, ok to proceed.

## 2024-05-06 NOTE — PROGRESS NOTES
Patient called PAT via  over the phone. Using the  patient was instructed on the pre-operative, intra-operative, and post-operative process. Patient instructed on NPO status. Medication instructions and pre operative instruction sheet reviewed with the patient. Instructed pt to take coreg with a small sip of water only. Patient is deaf and requires an  and is requesting a in person . Explained to the patient that in person may not be available since her procedure is tomorrow but we do have a live person via video for interpretation. Will attempt to contact  and arrange in person . If someone is not available for in person interpretation, patient is willing to proceed with procedure and using video  day of surgery.

## 2024-05-06 NOTE — TELEPHONE ENCOUNTER
Patient requesting a in person .  Language services contacted waiting for response on ability to have in person .  Pt. Willing if unable to get a in person in here she will continue with the procedure and use the virtual.

## 2024-05-07 ENCOUNTER — APPOINTMENT (OUTPATIENT)
Dept: GENERAL RADIOLOGY | Age: 29
End: 2024-05-07
Attending: UROLOGY
Payer: MEDICARE

## 2024-05-07 ENCOUNTER — ANESTHESIA (OUTPATIENT)
Dept: OPERATING ROOM | Age: 29
End: 2024-05-07
Payer: MEDICARE

## 2024-05-07 ENCOUNTER — HOSPITAL ENCOUNTER (OUTPATIENT)
Age: 29
Setting detail: OUTPATIENT SURGERY
Discharge: HOME | End: 2024-05-07
Attending: UROLOGY | Admitting: UROLOGY
Payer: MEDICARE

## 2024-05-07 VITALS
HEART RATE: 74 BPM | TEMPERATURE: 97.1 F | RESPIRATION RATE: 16 BRPM | WEIGHT: 228 LBS | OXYGEN SATURATION: 98 % | HEIGHT: 66 IN | BODY MASS INDEX: 36.64 KG/M2 | DIASTOLIC BLOOD PRESSURE: 89 MMHG | SYSTOLIC BLOOD PRESSURE: 129 MMHG

## 2024-05-07 PROCEDURE — 3700000000 HC ANESTHESIA ATTENDED CARE: Performed by: UROLOGY

## 2024-05-07 PROCEDURE — 2720000010 HC SURG SUPPLY STERILE: Performed by: UROLOGY

## 2024-05-07 PROCEDURE — C1769 GUIDE WIRE: HCPCS | Performed by: UROLOGY

## 2024-05-07 PROCEDURE — 2500000003 HC RX 250 WO HCPCS: Performed by: NURSE ANESTHETIST, CERTIFIED REGISTERED

## 2024-05-07 PROCEDURE — 3600000014 HC SURGERY LEVEL 4 ADDTL 15MIN: Performed by: UROLOGY

## 2024-05-07 PROCEDURE — A4216 STERILE WATER/SALINE, 10 ML: HCPCS | Performed by: NURSE ANESTHETIST, CERTIFIED REGISTERED

## 2024-05-07 PROCEDURE — 7100000010 HC PHASE II RECOVERY - FIRST 15 MIN: Performed by: UROLOGY

## 2024-05-07 PROCEDURE — 6370000000 HC RX 637 (ALT 250 FOR IP): Performed by: NURSE ANESTHETIST, CERTIFIED REGISTERED

## 2024-05-07 PROCEDURE — 6360000002 HC RX W HCPCS: Performed by: NURSE ANESTHETIST, CERTIFIED REGISTERED

## 2024-05-07 PROCEDURE — 2580000003 HC RX 258: Performed by: NURSE ANESTHETIST, CERTIFIED REGISTERED

## 2024-05-07 PROCEDURE — 6360000002 HC RX W HCPCS: Performed by: UROLOGY

## 2024-05-07 PROCEDURE — 3700000001 HC ADD 15 MINUTES (ANESTHESIA): Performed by: UROLOGY

## 2024-05-07 PROCEDURE — C1758 CATHETER, URETERAL: HCPCS | Performed by: UROLOGY

## 2024-05-07 PROCEDURE — C2617 STENT, NON-COR, TEM W/O DEL: HCPCS | Performed by: UROLOGY

## 2024-05-07 PROCEDURE — 6370000000 HC RX 637 (ALT 250 FOR IP): Performed by: UROLOGY

## 2024-05-07 PROCEDURE — 7100000011 HC PHASE II RECOVERY - ADDTL 15 MIN: Performed by: UROLOGY

## 2024-05-07 PROCEDURE — 2709999900 HC NON-CHARGEABLE SUPPLY: Performed by: UROLOGY

## 2024-05-07 PROCEDURE — 3600000004 HC SURGERY LEVEL 4 BASE: Performed by: UROLOGY

## 2024-05-07 PROCEDURE — C1747 HC ENDOSCOPE, SINGLE, URINARY TRACT: HCPCS | Performed by: UROLOGY

## 2024-05-07 DEVICE — URETERAL STENT
Type: IMPLANTABLE DEVICE | Site: URETER | Status: FUNCTIONAL
Brand: PERCUFLEX™ PLUS

## 2024-05-07 RX ORDER — DIMENHYDRINATE 50 MG
50 TABLET ORAL ONCE
Status: COMPLETED | OUTPATIENT
Start: 2024-05-07 | End: 2024-05-07

## 2024-05-07 RX ORDER — METOCLOPRAMIDE HYDROCHLORIDE 5 MG/ML
5 INJECTION INTRAMUSCULAR; INTRAVENOUS ONCE
Status: COMPLETED | OUTPATIENT
Start: 2024-05-07 | End: 2024-05-07

## 2024-05-07 RX ORDER — LIDOCAINE HYDROCHLORIDE 20 MG/ML
INJECTION, SOLUTION EPIDURAL; INFILTRATION; INTRACAUDAL; PERINEURAL PRN
Status: DISCONTINUED | OUTPATIENT
Start: 2024-05-07 | End: 2024-05-07 | Stop reason: SDUPTHER

## 2024-05-07 RX ORDER — SODIUM CHLORIDE 0.9 % (FLUSH) 0.9 %
5-40 SYRINGE (ML) INJECTION EVERY 12 HOURS SCHEDULED
Status: DISCONTINUED | OUTPATIENT
Start: 2024-05-07 | End: 2024-05-07 | Stop reason: HOSPADM

## 2024-05-07 RX ORDER — ACETAMINOPHEN 325 MG/1
650 TABLET ORAL ONCE
Status: COMPLETED | OUTPATIENT
Start: 2024-05-07 | End: 2024-05-07

## 2024-05-07 RX ORDER — ONDANSETRON 2 MG/ML
INJECTION INTRAMUSCULAR; INTRAVENOUS PRN
Status: DISCONTINUED | OUTPATIENT
Start: 2024-05-07 | End: 2024-05-07 | Stop reason: SDUPTHER

## 2024-05-07 RX ORDER — DEXAMETHASONE SODIUM PHOSPHATE 4 MG/ML
INJECTION, SOLUTION INTRA-ARTICULAR; INTRALESIONAL; INTRAMUSCULAR; INTRAVENOUS; SOFT TISSUE PRN
Status: DISCONTINUED | OUTPATIENT
Start: 2024-05-07 | End: 2024-05-07 | Stop reason: SDUPTHER

## 2024-05-07 RX ORDER — FENTANYL CITRATE 50 UG/ML
50 INJECTION, SOLUTION INTRAMUSCULAR; INTRAVENOUS EVERY 5 MIN PRN
Status: DISCONTINUED | OUTPATIENT
Start: 2024-05-07 | End: 2024-05-07 | Stop reason: HOSPADM

## 2024-05-07 RX ORDER — SODIUM CHLORIDE, SODIUM LACTATE, POTASSIUM CHLORIDE, CALCIUM CHLORIDE 600; 310; 30; 20 MG/100ML; MG/100ML; MG/100ML; MG/100ML
INJECTION, SOLUTION INTRAVENOUS CONTINUOUS
Status: DISCONTINUED | OUTPATIENT
Start: 2024-05-07 | End: 2024-05-07 | Stop reason: HOSPADM

## 2024-05-07 RX ORDER — FENTANYL CITRATE 50 UG/ML
INJECTION, SOLUTION INTRAMUSCULAR; INTRAVENOUS PRN
Status: DISCONTINUED | OUTPATIENT
Start: 2024-05-07 | End: 2024-05-07 | Stop reason: SDUPTHER

## 2024-05-07 RX ORDER — SODIUM CHLORIDE 0.9 % (FLUSH) 0.9 %
5-40 SYRINGE (ML) INJECTION PRN
Status: DISCONTINUED | OUTPATIENT
Start: 2024-05-07 | End: 2024-05-07 | Stop reason: HOSPADM

## 2024-05-07 RX ORDER — FENTANYL CITRATE 50 UG/ML
25 INJECTION, SOLUTION INTRAMUSCULAR; INTRAVENOUS EVERY 5 MIN PRN
Status: DISCONTINUED | OUTPATIENT
Start: 2024-05-07 | End: 2024-05-07 | Stop reason: HOSPADM

## 2024-05-07 RX ORDER — ONDANSETRON 2 MG/ML
4 INJECTION INTRAMUSCULAR; INTRAVENOUS ONCE
Status: COMPLETED | OUTPATIENT
Start: 2024-05-07 | End: 2024-05-07

## 2024-05-07 RX ORDER — CEFAZOLIN SODIUM IN 0.9 % NACL 2 G/100 ML
2000 PLASTIC BAG, INJECTION (ML) INTRAVENOUS
Status: COMPLETED | OUTPATIENT
Start: 2024-05-07 | End: 2024-05-07

## 2024-05-07 RX ORDER — SCOLOPAMINE TRANSDERMAL SYSTEM 1 MG/1
1 PATCH, EXTENDED RELEASE TRANSDERMAL ONCE
Status: DISCONTINUED | OUTPATIENT
Start: 2024-05-07 | End: 2024-05-07 | Stop reason: HOSPADM

## 2024-05-07 RX ORDER — LIDOCAINE HYDROCHLORIDE 20 MG/ML
JELLY TOPICAL PRN
Status: DISCONTINUED | OUTPATIENT
Start: 2024-05-07 | End: 2024-05-07 | Stop reason: ALTCHOICE

## 2024-05-07 RX ORDER — NALOXONE HYDROCHLORIDE 0.4 MG/ML
INJECTION, SOLUTION INTRAMUSCULAR; INTRAVENOUS; SUBCUTANEOUS PRN
Status: DISCONTINUED | OUTPATIENT
Start: 2024-05-07 | End: 2024-05-07 | Stop reason: HOSPADM

## 2024-05-07 RX ORDER — SODIUM CHLORIDE 9 MG/ML
INJECTION, SOLUTION INTRAVENOUS PRN
Status: DISCONTINUED | OUTPATIENT
Start: 2024-05-07 | End: 2024-05-07 | Stop reason: HOSPADM

## 2024-05-07 RX ORDER — KETOROLAC TROMETHAMINE 30 MG/ML
INJECTION, SOLUTION INTRAMUSCULAR; INTRAVENOUS PRN
Status: DISCONTINUED | OUTPATIENT
Start: 2024-05-07 | End: 2024-05-07 | Stop reason: SDUPTHER

## 2024-05-07 RX ORDER — PROPOFOL 10 MG/ML
INJECTION, EMULSION INTRAVENOUS PRN
Status: DISCONTINUED | OUTPATIENT
Start: 2024-05-07 | End: 2024-05-07 | Stop reason: SDUPTHER

## 2024-05-07 RX ADMIN — SODIUM CHLORIDE, POTASSIUM CHLORIDE, SODIUM LACTATE AND CALCIUM CHLORIDE: 600; 310; 30; 20 INJECTION, SOLUTION INTRAVENOUS at 10:30

## 2024-05-07 RX ADMIN — KETOROLAC TROMETHAMINE 30 MG: 30 INJECTION, SOLUTION INTRAMUSCULAR at 12:33

## 2024-05-07 RX ADMIN — FENTANYL CITRATE 50 MCG: 50 INJECTION INTRAMUSCULAR; INTRAVENOUS at 12:06

## 2024-05-07 RX ADMIN — PROPOFOL 150 MG: 10 INJECTION, EMULSION INTRAVENOUS at 12:10

## 2024-05-07 RX ADMIN — DIMENHYDRINATE 50 MG: 50 TABLET ORAL at 10:35

## 2024-05-07 RX ADMIN — METOCLOPRAMIDE 5 MG: 5 INJECTION, SOLUTION INTRAMUSCULAR; INTRAVENOUS at 10:44

## 2024-05-07 RX ADMIN — LIDOCAINE HYDROCHLORIDE 3 ML: 20 INJECTION, SOLUTION EPIDURAL; INFILTRATION; INTRACAUDAL; PERINEURAL at 12:06

## 2024-05-07 RX ADMIN — ONDANSETRON 4 MG: 2 INJECTION INTRAMUSCULAR; INTRAVENOUS at 12:20

## 2024-05-07 RX ADMIN — ACETAMINOPHEN 650 MG: 325 TABLET ORAL at 10:35

## 2024-05-07 RX ADMIN — ONDANSETRON 4 MG: 2 INJECTION INTRAMUSCULAR; INTRAVENOUS at 10:44

## 2024-05-07 RX ADMIN — SODIUM CHLORIDE, POTASSIUM CHLORIDE, SODIUM LACTATE AND CALCIUM CHLORIDE: 600; 310; 30; 20 INJECTION, SOLUTION INTRAVENOUS at 12:06

## 2024-05-07 RX ADMIN — DEXAMETHASONE SODIUM PHOSPHATE 4 MG: 4 INJECTION, SOLUTION INTRAMUSCULAR; INTRAVENOUS at 12:20

## 2024-05-07 RX ADMIN — FAMOTIDINE 20 MG: 10 INJECTION, SOLUTION INTRAVENOUS at 10:44

## 2024-05-07 RX ADMIN — Medication 2000 MG: at 12:02

## 2024-05-07 RX ADMIN — FENTANYL CITRATE 50 MCG: 50 INJECTION INTRAMUSCULAR; INTRAVENOUS at 12:41

## 2024-05-07 ASSESSMENT — PAIN SCALES - GENERAL: PAINLEVEL_OUTOF10: 9

## 2024-05-07 ASSESSMENT — PAIN DESCRIPTION - ORIENTATION: ORIENTATION: RIGHT

## 2024-05-07 ASSESSMENT — PAIN DESCRIPTION - LOCATION: LOCATION: ABDOMEN

## 2024-05-07 NOTE — PROGRESS NOTES
service unable to provide a in person  in less than 24 hours.  Patient contacted and advised we will be using the [employer] video .  Pt. Verbalizes understanding.

## 2024-05-07 NOTE — ANESTHESIA PRE PROCEDURE
Abdominal:   (+) obese          Vascular: negative vascular ROS.         Other Findings:         Anesthesia Plan      general     ASA 3       Induction: intravenous.    MIPS: Postoperative opioids intended and Prophylactic antiemetics administered.  Anesthetic plan and risks discussed with patient.      Plan discussed with CRNA.              Preoperative note completed with  from [employer] ( [place]).    KEERTHI Gold - CRNA   5/7/2024

## 2024-05-07 NOTE — ANESTHESIA POSTPROCEDURE EVALUATION
Department of Anesthesiology  Postprocedure Note    Patient: Pretty Tenorio  MRN: [id number]  YOB: 1995  Date of evaluation: 5/7/2024    Procedure Summary       Date: 05/07/24 Room / Location: [facility]    Anesthesia Start: 1204 Anesthesia Stop: 1246    Procedure: CYSTOSCOPY URETEROSCOPY LASER-HLL WITH RIGHT URETEAL STENT PLACEMENT (Right) Diagnosis:       Ureteral calculus      Renal stone      (Ureteral calculus [N20.1])      (Renal stone [N20.0])    Surgeons: Naman Harry MD Responsible Provider: Aileen Power APRN - CRNA    Anesthesia Type: general ASA Status: 3            Anesthesia Type: No value filed.    Azeb Phase I: Azeb Score: 10    Azeb Phase II: Azeb Score: 10    Anesthesia Post Evaluation    Patient location during evaluation: PACU  Patient participation: complete - patient participated  Level of consciousness: awake and alert  Airway patency: patent  Nausea & Vomiting: no nausea and no vomiting  Cardiovascular status: blood pressure returned to baseline and hemodynamically stable  Respiratory status: acceptable and room air  Hydration status: euvolemic  Pain management: adequate        No notable events documented.

## 2024-05-07 NOTE — DISCHARGE INSTRUCTIONS
SAME DAY SURGERY DISCHARGE INSTRUCTIONS    1.  Do not drive or operate hazardous machinery for 24 hours.    2.  Do not make important personal or business decisions for 24 hours.    3.  Do not drink alcoholic beverages for 24 hours.    4.  Do not smoke tobacco products for 24 hours.    5.  Eat light foods (Jell-O, soups, etc....) and drink plenty of fluids (water, Sprite, etc...) up to 8 glasses per day, as you can tolerate.    6.  Limit your activities for 24 hours.  Do not engage in heavy work until your surgeon gives you permission.      7.  Patient should not be left alone for 12-24 hours following surgical procedure.    8.  Wash hands before and after incision care.  It is important to practice good personal hygiene during the post op period.    9.  Call your surgeon for any questions regarding your surgery.    CYSTOSCOPY DISCHARGE INSTRUCTIONS    Possible burning during urination and/or blood tinged urine.    Drink 6-8 glasses of water for the next day or so.  (This helps to flush the urinary tract.)    Call Dr. Harry ([phone]) if you develop:    Fever over 100 degrees  Prolonged soreness/pain  Unusual bleeding/bruising  Unable to urinate or if urine is bloody  You cannot pass urine 8 hours after the test.  You have pain in your belly or your back just below your rib cage.  (This is called flank pain.)  You have frequent urge to urinate but can pass only small amounts of urine.    Call Dr. Harry office for follow-up appointment ([phone]).

## 2024-05-07 NOTE — FLOWSHEET NOTE
Patient verbalizes readiness to be discharged through  service. Requests pad. Declines a wheelchair.   Discharge Criteria    Inpatients must meet Criteria 1 through 7. All other patients are either YES or N/A. If a NO is chosen then Anesthesia or Surgeon must be notified.      1.  Minimum 30 minutes after last dose of sedative medication.    Yes      2.  Systolic BP between 90 - 160. Diastolic BP between 60 - 90.    Yes      3.  Pulse between 60 - 120    Yes      4.  Respirations between 8 - 25.    Yes      5.  SpO2 92% - 100%.    Yes      6.  Able to cough and swallow or return to baseline function.    Yes      7.  Alert and oriented or return to baseline mental status.    Yes      8.  Demonstrates controlled, coordinated movements, ambulates with steady gait, or return to baseline activity function.    Yes      9.  Minimal or no pain or nausea, or at a level tolerable and acceptable to patient.    Yes      10. Takes and retains oral fluids as allowed.    Yes      11. Procedural / perioperative site stable.  Minimal or no bleeding.    Yes          12. If GI endoscopy procedure, minimal or no abdominal distention or passing flatus.    N/A      13. Written discharge instructions and emergency telephone number provided.    Yes      14. Accompanied by a responsible adult.    Yes

## 2024-05-07 NOTE — PROGRESS NOTES
Called patient to notify her that the hospital was unable to arrange for in person  today but there will be a live person via virtual video. Patient understands and is OK with video . Informed the patient for future visits if she is requesting in person the  service needs to be notified 2 weeks prior to the hospital visit.

## 2024-05-07 NOTE — H&P
History and Physical    Patient:  Pretty Tenorio  MRN: [id number]    CHIEF COMPLAINT:  right flank pin    HISTORY OF PRESENT ILLNESS:   The patient is a 29 y.o. female who presents with right flank pain. Patient has been referred back to our office by Dr. Lopes for right flank and right abdominal pain. We are using a  for the entirety of the visit. We have not seen her since 2018. She was evaluated in the ER on 4/21/2024. At this time she did have a CT scan completed. This film was independently reviewed and does show 2 distal right ureteral stones that are adjacent causing hydronephrosis. There is also multiple small stones in the right kidney. She rates her current pain 10/10. She is nauseated, but denies vomiting. She is afebrile. She has had gross hematuria. She denies dysuria but does have pressure with urination.     Past Medical History:    Past Medical History:   Diagnosis Date    Abnormal Pap smear of cervix     Deafness     Ganglion cyst of wrist 2010    left    Gestational hypertension without significant proteinuria, antepartum     Hereditary nephropathy (Alport's) as achild     had renal biopsy    Hypertension     Irritable bowel syndrome     Kidney disease     Kidney stones     Microscopic hematuria     Obesity     PONV (postoperative nausea and vomiting)     Proteinuria     Sensorineural hearing loss     Thin basement membrane disease     Bx proven       Past Surgical History:    Past Surgical History:   Procedure Laterality Date    COLONOSCOPY  2009    Dr. Torres    CYSTOSCOPY Left 8/18/2015    CYSTOSCOPY  11/14/2018    with stent placement    HYSTERECTOMY, VAGINAL N/A 4/11/2022    HYSTERECTOMY VAGINAL LAPAROSCOPIC ROBOTIC ASSISTED- BSO performed by Esperanza Edwards DO at [facility] OR    KIDNEY BIOPSY  12/29/98    LAPAROSCOPY      OK CYSTO/URETERO W/LITHOTRIPSY &INDWELL STENT INSRT Right 11/14/2018    CYSTOSCOPY URETEROSCOPY LASER, HLL performed by Naman Harry MD at

## 2024-05-08 NOTE — OP NOTE
[address] [address]                            OPERATIVE REPORT      PATIENT NAME: LILIANE WILLIAMSON             : 1995  MED REC NO: [id number]                          ROOM: [facility]  ACCOUNT NO: [id number]                       ADMIT DATE: 2024  PROVIDER: Naman Harry MD      DATE OF PROCEDURE:  2024    SURGEON:  Naman Harry MD    ASSISTANT:  None.    PREOPERATIVE DIAGNOSIS:  Right ureteral calculus.    POSTOPERATIVE DIAGNOSIS:  Right renal calculus.    PROCEDURES:  Cystoscopy, right ureteroscopy, right holmium laser lithotripsy, right ureteral stent placement.    ANESTHESIA:  General.    COMPLICATIONS:  None.    ESTIMATED BLOOD LOSS:  Minimal.    SPECIMENS:  None.    PROSTHESIS:  A 6-Croatian x 26 cm double-J ureteral stent.    DISPOSITION:  Stable.    FINDINGS:  Multiple right renal stones.    INDICATIONS:  The patient is a 29-year-old female who presents with a distal right ureteral stone, here now for definitive therapy.  The patient has also multiple renal stones seen on CT scan.    DESCRIPTION OF PROCEDURE:  The patient was taken back to the operating room after informed consent, including all risks, benefits, and alternatives obtained.  The patient was transferred from the [facility] onto the operating room table where she was induced under general anesthesia and given IV Ancef for preoperative antibiotic prophylaxis.  To begin the case, she was prepped and draped in the normal sterile fashion, placed in dorsal lithotomy.  She had a 22-Croatian sheath 30-degree lens passed through the urethra into the bladder.  Once in the bladder, we identified the right ureteral orifice, 0.035-inch wire was passed up.  We then placed a dual-lumen catheter and placed a flexible ureteroscope over the Glidewire.  We were able to identify a small stone in the ureter.  We did take the 270-micron laser fiber to ablate

## 2024-05-09 ENCOUNTER — TELEPHONE (OUTPATIENT)
Dept: UROLOGY | Age: 29
End: 2024-05-09

## 2024-05-09 ENCOUNTER — OFFICE VISIT (OUTPATIENT)
Dept: UROLOGY | Age: 29
End: 2024-05-09
Payer: MEDICARE

## 2024-05-09 VITALS
SYSTOLIC BLOOD PRESSURE: 119 MMHG | BODY MASS INDEX: 37.61 KG/M2 | DIASTOLIC BLOOD PRESSURE: 82 MMHG | TEMPERATURE: 96.9 F | WEIGHT: 233 LBS | HEART RATE: 65 BPM

## 2024-05-09 DIAGNOSIS — N13.2 URETERAL STONE WITH HYDRONEPHROSIS: Primary | ICD-10-CM

## 2024-05-09 LAB
MICROORGANISM SPEC CULT: NORMAL
MICROORGANISM SPEC CULT: NORMAL
SERVICE CMNT-IMP: NORMAL
SERVICE CMNT-IMP: NORMAL
SPECIMEN DESCRIPTION: NORMAL
SPECIMEN DESCRIPTION: NORMAL

## 2024-05-09 PROCEDURE — 99214 OFFICE O/P EST MOD 30 MIN: CPT | Performed by: UROLOGY

## 2024-05-09 PROCEDURE — 4004F PT TOBACCO SCREEN RCVD TLK: CPT | Performed by: UROLOGY

## 2024-05-09 PROCEDURE — 3079F DIAST BP 80-89 MM HG: CPT | Performed by: UROLOGY

## 2024-05-09 PROCEDURE — G8417 CALC BMI ABV UP PARAM F/U: HCPCS | Performed by: UROLOGY

## 2024-05-09 PROCEDURE — 3074F SYST BP LT 130 MM HG: CPT | Performed by: UROLOGY

## 2024-05-09 PROCEDURE — G8427 DOCREV CUR MEDS BY ELIG CLIN: HCPCS | Performed by: UROLOGY

## 2024-05-09 ASSESSMENT — ENCOUNTER SYMPTOMS
CONSTIPATION: 0
APNEA: 0
EYE REDNESS: 0
COUGH: 0
VOMITING: 0
SHORTNESS OF BREATH: 0
WHEEZING: 0
ABDOMINAL PAIN: 0
NAUSEA: 0
COLOR CHANGE: 0
BACK PAIN: 0

## 2024-05-09 NOTE — PROGRESS NOTES
Pt had ureteral stent placed on 05/07/2024 following right HLL.   Stent removed via string in office today without difficulty. Pt tolerated removal well.   
Utilized  via IPAD:  Miya [id number]  Start time 11:05 am  11:14 am disconnected.    Marianne [id number]  start 11:15 am  end 11:29 am  
visit.     Patient has no known allergies.  Family History   Problem Relation Age of Onset    Cancer Paternal Grandmother         bone    High Cholesterol Mother     Cancer Mother         breast    High Blood Pressure Mother     Migraines Mother     Alcohol Abuse Father     Anxiety Disorder Father     Depression Father     Hearing Loss Brother     Hearing Loss Brother      Social History     Tobacco Use   Smoking Status Every Day    Current packs/day: 0.00    Types: Cigarettes    Last attempt to quit: 3/18/2014    Years since quitting: 10.1   Smokeless Tobacco Never   Tobacco Comments    several cigarettes daily       Social History     Substance and Sexual Activity   Alcohol Use No    Alcohol/week: 0.0 standard drinks of alcohol       Review of Systems   Constitutional:  Negative for appetite change, chills and fever.   Eyes:  Negative for redness and visual disturbance.   Respiratory:  Negative for apnea, cough, shortness of breath and wheezing.    Cardiovascular:  Negative for chest pain and leg swelling.   Gastrointestinal:  Negative for abdominal pain, constipation, nausea and vomiting.   Genitourinary:  Positive for hematuria. Negative for difficulty urinating, dyspareunia, dysuria, enuresis, flank pain, frequency, pelvic pain, urgency, vaginal bleeding and vaginal discharge.   Musculoskeletal:  Negative for back pain, joint swelling and myalgias.   Skin:  Negative for color change, rash and wound.   Neurological:  Negative for dizziness, tremors and numbness.   Hematological:  Negative for adenopathy. Does not bruise/bleed easily.   Psychiatric/Behavioral:  Negative for sleep disturbance.        /82 (Site: Left Upper Arm, Position: Sitting, Cuff Size: Large Adult)   Pulse 65   Temp 96.9 °F (36.1 °C) (Infrared)   Wt 105.7 kg (233 lb)   LMP 09/01/2020   BMI 37.61 kg/m²       PHYSICAL EXAM:  Constitutional: Patient resting comfortably, in no acute distress.   Neuro: Alert and oriented to person place

## 2024-05-09 NOTE — TELEPHONE ENCOUNTER
----- Message from Stefano Hudson PA-C sent at 5/6/2024  1:15 PM EDT -----  Please call pt - urine culture reviewed and does not show UTI

## 2024-10-14 ENCOUNTER — HOSPITAL ENCOUNTER (EMERGENCY)
Age: 29
Discharge: HOME | End: 2024-10-14
Payer: MEDICARE

## 2024-10-14 ENCOUNTER — APPOINTMENT (OUTPATIENT)
Dept: GENERAL RADIOLOGY | Age: 29
End: 2024-10-14
Payer: MEDICARE

## 2024-10-14 VITALS
RESPIRATION RATE: 18 BRPM | DIASTOLIC BLOOD PRESSURE: 96 MMHG | BODY MASS INDEX: 38.57 KG/M2 | WEIGHT: 240 LBS | OXYGEN SATURATION: 100 % | TEMPERATURE: 98 F | HEART RATE: 56 BPM | HEIGHT: 66 IN | SYSTOLIC BLOOD PRESSURE: 147 MMHG

## 2024-10-14 DIAGNOSIS — R09.1 PLEURISY: ICD-10-CM

## 2024-10-14 DIAGNOSIS — R05.9 COUGH, UNSPECIFIED TYPE: Primary | ICD-10-CM

## 2024-10-14 LAB
ALBUMIN SERPL-MCNC: 4.2 G/DL (ref 3.5–5.2)
ALBUMIN/GLOB SERPL: 1.6 {RATIO} (ref 1–2.5)
ALP SERPL-CCNC: 79 U/L (ref 35–104)
ALT SERPL-CCNC: 20 U/L (ref 10–35)
ANION GAP SERPL CALCULATED.3IONS-SCNC: 11 MMOL/L (ref 9–16)
AST SERPL-CCNC: 22 U/L (ref 10–35)
BASOPHILS # BLD: 0.04 K/UL (ref 0–0.2)
BASOPHILS NFR BLD: 1 % (ref 0–2)
BILIRUB SERPL-MCNC: 0.2 MG/DL (ref 0–1.2)
BUN SERPL-MCNC: 14 MG/DL (ref 6–20)
BUN/CREAT SERPL: 18 (ref 9–20)
CALCIUM SERPL-MCNC: 9.4 MG/DL (ref 8.6–10.4)
CHLORIDE SERPL-SCNC: 103 MMOL/L (ref 98–107)
CO2 SERPL-SCNC: 26 MMOL/L (ref 20–31)
CREAT SERPL-MCNC: 0.8 MG/DL (ref 0.5–0.9)
D DIMER PPP FEU-MCNC: 0.27 UG/ML FEU (ref 0–0.59)
EOSINOPHIL # BLD: 0.32 K/UL (ref 0–0.44)
EOSINOPHILS RELATIVE PERCENT: 5 % (ref 1–4)
ERYTHROCYTE [DISTWIDTH] IN BLOOD BY AUTOMATED COUNT: 12.4 % (ref 11.8–14.4)
GFR, ESTIMATED: >90 ML/MIN/1.73M2
GLUCOSE SERPL-MCNC: 82 MG/DL (ref 74–99)
HCT VFR BLD AUTO: 42 % (ref 36.3–47.1)
HGB BLD-MCNC: 14.1 G/DL (ref 11.9–15.1)
IMM GRANULOCYTES # BLD AUTO: 0.03 K/UL (ref 0–0.3)
IMM GRANULOCYTES NFR BLD: 0 %
LYMPHOCYTES NFR BLD: 0.95 K/UL (ref 1.1–3.7)
LYMPHOCYTES RELATIVE PERCENT: 14 % (ref 24–43)
MCH RBC QN AUTO: 30.4 PG (ref 25.2–33.5)
MCHC RBC AUTO-ENTMCNC: 33.6 G/DL (ref 28.4–34.8)
MCV RBC AUTO: 90.5 FL (ref 82.6–102.9)
MONOCYTES NFR BLD: 0.58 K/UL (ref 0.1–1.2)
MONOCYTES NFR BLD: 9 % (ref 3–12)
NEUTROPHILS NFR BLD: 71 % (ref 36–65)
NEUTS SEG NFR BLD: 4.89 K/UL (ref 1.5–8.1)
NRBC BLD-RTO: 0 PER 100 WBC
PLATELET # BLD AUTO: 284 K/UL (ref 138–453)
PMV BLD AUTO: 9.7 FL (ref 8.1–13.5)
POTASSIUM SERPL-SCNC: 4.1 MMOL/L (ref 3.7–5.3)
PROT SERPL-MCNC: 6.9 G/DL (ref 6.6–8.7)
RBC # BLD AUTO: 4.64 M/UL (ref 3.95–5.11)
SODIUM SERPL-SCNC: 140 MMOL/L (ref 136–145)
TROPONIN I SERPL HS-MCNC: <6 NG/L (ref 0–14)
WBC OTHER # BLD: 6.8 K/UL (ref 3.5–11.3)

## 2024-10-14 PROCEDURE — 6360000002 HC RX W HCPCS

## 2024-10-14 PROCEDURE — 71046 X-RAY EXAM CHEST 2 VIEWS: CPT

## 2024-10-14 PROCEDURE — 93005 ELECTROCARDIOGRAM TRACING: CPT

## 2024-10-14 PROCEDURE — 96374 THER/PROPH/DIAG INJ IV PUSH: CPT

## 2024-10-14 PROCEDURE — 80053 COMPREHEN METABOLIC PANEL: CPT

## 2024-10-14 PROCEDURE — 99285 EMERGENCY DEPT VISIT HI MDM: CPT

## 2024-10-14 PROCEDURE — 84484 ASSAY OF TROPONIN QUANT: CPT

## 2024-10-14 PROCEDURE — 85025 COMPLETE CBC W/AUTO DIFF WBC: CPT

## 2024-10-14 PROCEDURE — 85379 FIBRIN DEGRADATION QUANT: CPT

## 2024-10-14 RX ORDER — KETOROLAC TROMETHAMINE 15 MG/ML
15 INJECTION, SOLUTION INTRAMUSCULAR; INTRAVENOUS ONCE
Status: COMPLETED | OUTPATIENT
Start: 2024-10-14 | End: 2024-10-14

## 2024-10-14 RX ADMIN — KETOROLAC TROMETHAMINE 15 MG: 15 INJECTION, SOLUTION INTRAMUSCULAR; INTRAVENOUS at 04:05

## 2024-10-14 ASSESSMENT — PAIN DESCRIPTION - DESCRIPTORS
DESCRIPTORS: STABBING
DESCRIPTORS: STABBING

## 2024-10-14 ASSESSMENT — PAIN SCALES - GENERAL
PAINLEVEL_OUTOF10: 7
PAINLEVEL_OUTOF10: 4
PAINLEVEL_OUTOF10: 8

## 2024-10-14 ASSESSMENT — PAIN DESCRIPTION - LOCATION
LOCATION: ABDOMEN
LOCATION: FLANK
LOCATION: FLANK

## 2024-10-14 ASSESSMENT — PAIN DESCRIPTION - ONSET: ONSET: SUDDEN

## 2024-10-14 ASSESSMENT — PAIN DESCRIPTION - ORIENTATION
ORIENTATION: LEFT
ORIENTATION: LEFT;UPPER
ORIENTATION: LEFT

## 2024-10-14 ASSESSMENT — PAIN DESCRIPTION - FREQUENCY: FREQUENCY: CONTINUOUS

## 2024-10-14 ASSESSMENT — PAIN DESCRIPTION - PAIN TYPE: TYPE: ACUTE PAIN

## 2024-10-14 ASSESSMENT — ENCOUNTER SYMPTOMS
NAUSEA: 0
COUGH: 1
ABDOMINAL PAIN: 0
VOMITING: 0
SHORTNESS OF BREATH: 0

## 2024-10-14 ASSESSMENT — PAIN - FUNCTIONAL ASSESSMENT: PAIN_FUNCTIONAL_ASSESSMENT: 0-10

## 2024-10-14 NOTE — ED PROVIDER NOTES
[facility] ED  Emergency Department Encounter  Emergency Medicine Attending     Pt Name:Pretty Tenorio  MRN: [id number]  Birthdate 1995  Date of evaluation: 10/14/24  PCP:  Ruy Lopes MD  Note Started: 3:50 AM EDT      CHIEF COMPLAINT       Chief Complaint   Patient presents with    Flank Pain     Pt to ED from work with c/o left flank pain.  Onset of symptoms x4 days ago.  Pt also reports diarrhea. Denies nausea, vomiting, or constipation.  Pt took Ibuprofen at 10pm last night.       HISTORY OF PRESENT ILLNESS  (Location/Symptom, Timing/Onset, Context/Setting, Quality, Duration, Modifying Factors, Severity.)      29-year-old female presenting for left-sided chest wall pain and flank pain.  She states that she has been coughing a lot recently as she has an upper respiratory infection and she noticed for about the last 4 days that when she coughs she has significant pain of her left chest wall and flank.  She does produce green sputum.  No fevers.  No respiratory distress.  No central chest pain.  No abdominal pain.  She did try Motrin yesterday evening with no improvement of her symptoms.  No hemoptysis.  Patient does take estradiol for hormone replacement as she did have a hysterectomy performed in 2020.  No leg swelling or erythema.            PAST MEDICAL / SURGICAL / SOCIAL / FAMILY HISTORY      has a past medical history of Abnormal Pap smear of cervix, Deafness, Ganglion cyst of wrist, Gestational hypertension without significant proteinuria, antepartum, Hereditary nephropathy (Alport's), Hypertension, Irritable bowel syndrome, Kidney disease, Kidney stones, Microscopic hematuria, Obesity, PONV (postoperative nausea and vomiting), Proteinuria, Sensorineural hearing loss, and Thin basement membrane disease.     has a past surgical history that includes Kidney biopsy (12/29/98); Wrist surgery (2010); [place] tooth extraction; Cystoscopy (Left, 8/18/2015); Ureteroscopy (8-);

## 2024-10-15 LAB
EKG ATRIAL RATE: 70 BPM
EKG P AXIS: 22 DEGREES
EKG P-R INTERVAL: 168 MS
EKG Q-T INTERVAL: 398 MS
EKG QRS DURATION: 84 MS
EKG QTC CALCULATION (BAZETT): 429 MS
EKG R AXIS: 25 DEGREES
EKG T AXIS: 15 DEGREES
EKG VENTRICULAR RATE: 70 BPM

## 2024-10-15 PROCEDURE — 93010 ELECTROCARDIOGRAM REPORT: CPT | Performed by: FAMILY MEDICINE

## 2025-01-03 NOTE — PROGRESS NOTES
Maria R Reyes CNM upadated with Pt SVE, urine results and blood pressure results. Pt states she feels better overall. LUIS Carver CNM stated to draw and collect  LDH, CBC, CMP, Uric acid, urine culture and protein/ Cr ratio. JEFF would like to be called with results. oral

## 2025-01-11 DIAGNOSIS — E89.40 SURGICAL MENOPAUSE: ICD-10-CM

## 2025-01-13 ENCOUNTER — TELEPHONE (OUTPATIENT)
Dept: OBGYN | Age: 30
End: 2025-01-13

## 2025-01-13 RX ORDER — ESTRADIOL 1 MG/1
1 TABLET ORAL DAILY
Qty: 30 TABLET | Refills: 0 | Status: SHIPPED | OUTPATIENT
Start: 2025-01-13

## 2025-02-28 DIAGNOSIS — E89.40 SURGICAL MENOPAUSE: ICD-10-CM

## 2025-03-03 DIAGNOSIS — E89.40 SURGICAL MENOPAUSE: ICD-10-CM

## 2025-03-03 RX ORDER — ESTRADIOL 1 MG/1
1 TABLET ORAL DAILY
Qty: 90 TABLET | Refills: 3 | Status: SHIPPED | OUTPATIENT
Start: 2025-03-03

## 2025-03-03 RX ORDER — ESTRADIOL 1 MG/1
1 TABLET ORAL DAILY
Qty: 30 TABLET | Refills: 0 | OUTPATIENT
Start: 2025-03-03

## 2025-03-17 ENCOUNTER — HOSPITAL ENCOUNTER (OUTPATIENT)
Age: 30
Setting detail: SPECIMEN
Discharge: HOME | End: 2025-03-17
Payer: MEDICARE

## 2025-03-17 ENCOUNTER — OFFICE VISIT (OUTPATIENT)
Dept: OBGYN | Age: 30
End: 2025-03-17
Payer: MEDICARE

## 2025-03-17 VITALS
DIASTOLIC BLOOD PRESSURE: 82 MMHG | HEIGHT: 66 IN | BODY MASS INDEX: 40.18 KG/M2 | WEIGHT: 250 LBS | SYSTOLIC BLOOD PRESSURE: 134 MMHG

## 2025-03-17 DIAGNOSIS — Z01.419 ENCOUNTER FOR WELL WOMAN EXAM WITH ROUTINE GYNECOLOGICAL EXAM: Primary | ICD-10-CM

## 2025-03-17 DIAGNOSIS — Z11.51 SCREENING FOR HPV (HUMAN PAPILLOMAVIRUS): ICD-10-CM

## 2025-03-17 DIAGNOSIS — Z01.419 ENCOUNTER FOR WELL WOMAN EXAM WITH ROUTINE GYNECOLOGICAL EXAM: ICD-10-CM

## 2025-03-17 PROCEDURE — 3079F DIAST BP 80-89 MM HG: CPT | Performed by: ADVANCED PRACTICE MIDWIFE

## 2025-03-17 PROCEDURE — G0145 SCR C/V CYTO,THINLAYER,RESCR: HCPCS

## 2025-03-17 PROCEDURE — G0101 CA SCREEN;PELVIC/BREAST EXAM: HCPCS | Performed by: ADVANCED PRACTICE MIDWIFE

## 2025-03-17 PROCEDURE — 3075F SYST BP GE 130 - 139MM HG: CPT | Performed by: ADVANCED PRACTICE MIDWIFE

## 2025-03-17 PROCEDURE — 87624 HPV HI-RISK TYP POOLED RSLT: CPT

## 2025-03-17 NOTE — PATIENT INSTRUCTIONS
SURVEY:    You may be receiving a survey regarding your visit today.    Please complete the survey to enable us to provide the highest quality of care to you and your family.    We strive for all 5's - thank you    If you cannot score us a very good (5) on any question, please call the office to discuss this with Jasmyn (our ). We would like to discuss how we could of made your experience a very good one.    Jasmyn: [phone]    Thank you.   
no

## 2025-03-17 NOTE — PROGRESS NOTES
absent  Urethral Meatus:  Size normal, Location normal, Lesions absent, Prolapse absent  Urethra:  Fullness absent, Masses absent  Bladder:  Fullness absent, Masses absent, Tenderness absent, Cystocele absent  Vagina:  General appearance normal, Estrogen effect normal, Discharge absent, Lesions absent, Pelvic support normal  Cervix: removed  Uterus:  removed  Adenexa:  Masses absent, Tenderness absent, Enlargement absent                                    Vaginal discharge: no vaginal discharge                              Assessment and Plan         Assessment & Plan  Encounter for well woman exam with routine gynecological exam   Chronic, at goal (stable), continue current plan pending work up below    Orders:    PAP Smear; Future    Screening for HPV (human papillomavirus)   Chronic, at goal (stable), continue current plan pending work up below    Orders:    PAP Smear; Future      Patient will talk to primary care about weight loss and back pain      I am having Pretty Tenorio maintain her carvedilol and estradiol.    Return in about 1 year (around 3/17/2026) for yearly & schedule  and put info in notes.    She was also counseled on her preventative health maintenance recommendations and follow-up.     Patient Instructions   SURVEY:    You may be receiving a survey regarding your visit today.    Please complete the survey to enable us to provide the highest quality of care to you and your family.    We strive for all 5's - thank you    If you cannot score us a very good (5) on any question, please call the office to discuss this with Jasmyn (our ). We would like to discuss how we could of made your experience a very good one.    Jasmyn: [phone]    Thank you.     KEERTHI Javed - JEFF,3/17/2025 10:12 AM

## 2025-03-19 ENCOUNTER — RESULTS FOLLOW-UP (OUTPATIENT)
Dept: LAB | Age: 30
End: 2025-03-19

## 2025-03-19 LAB
HPV I/H RISK 4 DNA CVX QL NAA+PROBE: NOT DETECTED
HPV SAMPLE: NORMAL
HPV, INTERPRETATION: NORMAL
HPV16 DNA CVX QL NAA+PROBE: NOT DETECTED
HPV18 DNA CVX QL NAA+PROBE: NOT DETECTED
SPECIMEN DESCRIPTION: NORMAL

## 2025-03-27 LAB — CYTOLOGY REPORT: NORMAL

## 2025-05-12 NOTE — PROGRESS NOTES
BS FA application has been completed for patient. Patient will bring completed application with supporting documents to the hospital.   PROBLEM VISIT     Date of service: 2020    Colette Reardon  Is a 31 y.o.  female    PT's PCP is: Mike Garcia MD     : 1995                                             Subjective:       Patient's last menstrual period was 2020 (exact date). OB History    Para Term  AB Living   3 1 1     1   SAB TAB Ectopic Molar Multiple Live Births             1      # Outcome Date GA Lbr Poli/2nd Weight Sex Delivery Anes PTL Lv   3 Current            2 Term 14 40w0d  7 lb 6 oz (3.345 kg) F Vag-Spont  N NAYELI   1                  Social History     Tobacco Use   Smoking Status Former Smoker    Types: Cigarettes    Last attempt to quit: 3/18/2014    Years since quittin.6   Smokeless Tobacco Never Used   Tobacco Comment    several cigarettes daily        Social History     Substance and Sexual Activity   Alcohol Use No    Alcohol/week: 0.0 standard drinks       Social History     Substance and Sexual Activity   Sexual Activity Yes    Partners: Male       Allergies: Patient has no known allergies. Chief Complaint   Patient presents with    Routine Prenatal Visit     F/U in house u/s to r/o ectopic pregnancy. pt called and c/o severe lower abdominal cramping. PE: Vital Signs  Blood pressure 132/82, weight 219 lb (99.3 kg), last menstrual period 2020, not currently breastfeeding. Estimated body mass index is 35.35 kg/m² as calculated from the following:    Height as of 9/15/20: 5' 6\" (1.676 m). Weight as of this encounter: 219 lb (99.3 kg). HPI: Patient here today early pregnancy.   Patient stating she is having a lot of pelvic pain    Patient was brought in today to rule out ectopic pregnancy    Yes  PT denies fever, chills, nausea and vomiting         5.5 wk GS, YS noted, no fetal pole visualized  CL- 3.4 cm  Rt ovary- 2 cm CL cyst  Lt ovary- WNL  No free fluid   Large amt of over-lying bowel LLQ                              Assessment and Plan          Diagnosis Orders   1. Pelvic pain affecting pregnancy in first trimester, antepartum     2. Pelvic pain         We will keep next appointment with repeat dating ultrasound to assess pregnancy viability      I am having Pretty Tenorio maintain her acetaminophen, sertraline, dicyclomine, naproxen, hyoscyamine, ketorolac, and Prenatal Vit-Fe Fumarate-FA (PRENATAL VITAMIN PO). Return for Keep next appt. She was also counseled on her preventative health maintenance recommendations and follow-up. There are no Patient Instructions on file for this visit.     Carolyn Dennison,11/16/2020 1:17 PM

## (undated) DEVICE — Z DISCONTINUED APPLICATOR SURG PREP 0.35OZ 2% CHG 70% ISO ALC W/ HI LT

## (undated) DEVICE — BLADELESS OBTURATOR: Brand: WECK VISTA

## (undated) DEVICE — MATERNITY KNIT PANTS,SEAMLESS: Brand: WINGS

## (undated) DEVICE — SOLUTION SURG PREP ANTIMICROBIAL 4 OZ SKIN WND EXIDINE

## (undated) DEVICE — DRAPE,UTILTY,TAPE,15X26, 4EA/PK: Brand: MEDLINE

## (undated) DEVICE — GLOVE ORANGE PI 7 1/2   MSG9075

## (undated) DEVICE — URETEROSCOPE (SEE ITEM COMMENTS) DIGITAL URS FLX SU MODEL E NORM DEFL AXIS II

## (undated) DEVICE — TOTAL TRAY, DB, 100% SILI FOLEY, 16FR 10: Brand: MEDLINE

## (undated) DEVICE — STRIP,CLOSURE,WOUND,MEDI-STRIP,1/2X4: Brand: MEDLINE

## (undated) DEVICE — COVER,MAYO STAND,STERILE: Brand: MEDLINE

## (undated) DEVICE — MASTISOL ADHESIVE LIQ 2/3ML

## (undated) DEVICE — WARMER SCP 2 ANTIFOG LAP DISP

## (undated) DEVICE — ADAPTER URO SCP UROLOK LL

## (undated) DEVICE — SOLUTION IV IRRIG WATER 1000ML POUR BRL 2F7114

## (undated) DEVICE — SPONGE GZ W4XL4IN CELOS POSTOP AVANT

## (undated) DEVICE — COVER LT HNDL BLU PLAS

## (undated) DEVICE — SUTURE DEV SZ 2-0 WND CLSR ABSRB GS-22 VLOC COVIDIEN VLOCM2145

## (undated) DEVICE — Z INACTIVE USE 2660664 SOLUTION IRRIG 3000ML 0.9% SOD CHL USP UROMATIC PLAS CONT

## (undated) DEVICE — SINGLE ACTION PUMPING SYSTEM

## (undated) DEVICE — Device: Brand: UTERINE ELEVATOR PRO

## (undated) DEVICE — GUIDEWIRE VASC NITINOL HYDROPHIL STR 3 CM 0.035 INX150 CM STD NIT ZIPWIRE

## (undated) DEVICE — SUTURE MCRYL SZ 4-0 L27IN ABSRB UD L19MM PS-2 1/2 CIR PRIM Y426H

## (undated) DEVICE — GOWN,SIRUS,POLYRNF,BRTHSLV,XL,30/CS: Brand: MEDLINE

## (undated) DEVICE — INSUFFLATION NEEDLE TO ESTABLISH PNEUMOPERITONEUM.: Brand: INSUFFLATION NEEDLE

## (undated) DEVICE — SYRINGE MED 10ML LUERLOCK TIP W/O SFTY DISP

## (undated) DEVICE — UNDERPANTS INCONT XL 45-70IN KNIT SEAMLESS DSGN COLOR-CODED

## (undated) DEVICE — Z DISCONTINUED BY MEDLINE USE 2711682 TRAY SKIN PREP DRY W/ PREM GLV

## (undated) DEVICE — Device

## (undated) DEVICE — GOWN,AURORA,NON-REINFORCED,2XL: Brand: MEDLINE

## (undated) DEVICE — MERCY TIFFIN CYSTO-LF: Brand: MEDLINE INDUSTRIES, INC.

## (undated) DEVICE — CANNULA SEAL

## (undated) DEVICE — ELECTRODE ES AD CRD L15FT DISP FOR PT BELOW 30LB REM

## (undated) DEVICE — TIP COVER ACCESSORY

## (undated) DEVICE — GLOVE SURG SZ 65 L12IN FNGR THK87MIL WHT LTX FREE

## (undated) DEVICE — FIBER LASER EXCALIBUR HOLM

## (undated) DEVICE — SOLUTION IV IRRIG POUR BRL 0.9% SODIUM CHL 2F7124

## (undated) DEVICE — DRESSING SURESITE-123PLUSPAD 2.4 IN X2.8 IN

## (undated) DEVICE — DUAL LUMEN URETERAL CATHETER

## (undated) DEVICE — ELECTRO LUBE IS A SINGLE PATIENT USE DEVICE THAT IS INTENDED TO BE USED ON ELECTROSURGICAL ELECTRODES TO REDUCE STICKING.: Brand: KEY SURGICAL ELECTRO LUBE

## (undated) DEVICE — DRESSING HEMSTAT W1X2IN ABSRB SURGCEL SNOW

## (undated) DEVICE — LAVH-LF: Brand: MEDLINE INDUSTRIES, INC.

## (undated) DEVICE — 20 ML SYRINGE LUER-LOCK TIP: Brand: MONOJECT

## (undated) DEVICE — ARM DRAPE

## (undated) DEVICE — SPONGE LAP W18XL18IN WHT COT 4 PLY FLD STRUNG RADPQ DISP ST

## (undated) DEVICE — APPLICATOR MEDICATED 26 CC SOLUTION HI LT ORNG CHLORAPREP

## (undated) DEVICE — 40586 ADVANCED TRENDELENBURG POSITIONING KIT: Brand: 40586 ADVANCED TRENDELENBURG POSITIONING KIT

## (undated) DEVICE — [HIGH FLOW INSUFFLATOR,  DO NOT USE IF PACKAGE IS DAMAGED,  KEEP DRY,  KEEP AWAY FROM SUNLIGHT,  PROTECT FROM HEAT AND RADIOACTIVE SOURCES.]: Brand: PNEUMOSURE

## (undated) DEVICE — GUIDEWIRE VASC STR 3 CM 0.035 INX150 CM STD NIT ZIPWIRE

## (undated) DEVICE — SYRINGE MED 20ML STD CLR PLAS LUERLOCK TIP N CTRL DISP